# Patient Record
Sex: MALE | Race: WHITE | Employment: FULL TIME | URBAN - METROPOLITAN AREA
[De-identification: names, ages, dates, MRNs, and addresses within clinical notes are randomized per-mention and may not be internally consistent; named-entity substitution may affect disease eponyms.]

---

## 2018-02-07 ENCOUNTER — HOSPITAL ENCOUNTER (OUTPATIENT)
Dept: MRI IMAGING | Age: 58
Discharge: HOME | End: 2018-02-07
Attending: FAMILY MEDICINE

## 2018-02-07 DIAGNOSIS — M54.32 SCIATICA, LEFT SIDE: ICD-10-CM

## 2018-03-04 ENCOUNTER — HOSPITAL ENCOUNTER (OUTPATIENT)
Dept: MRI IMAGING | Age: 58
Discharge: HOME | End: 2018-03-04
Attending: FAMILY MEDICINE
Payer: COMMERCIAL

## 2018-03-04 PROCEDURE — 72148 MRI LUMBAR SPINE W/O DYE: CPT

## 2022-08-23 ENCOUNTER — TRANSCRIBE ORDER (OUTPATIENT)
Dept: SCHEDULING | Age: 62
End: 2022-08-23

## 2022-08-23 DIAGNOSIS — M17.11 PRIMARY OSTEOARTHRITIS OF RIGHT KNEE: Primary | ICD-10-CM

## 2022-08-23 DIAGNOSIS — Z00.00 ROUTINE GENERAL MEDICAL EXAMINATION AT A HEALTH CARE FACILITY: Primary | ICD-10-CM

## 2022-09-27 ENCOUNTER — HOSPITAL ENCOUNTER (OUTPATIENT)
Dept: CT IMAGING | Age: 62
Discharge: HOME | End: 2022-09-27
Attending: ORTHOPAEDIC SURGERY
Payer: COMMERCIAL

## 2022-09-27 DIAGNOSIS — M17.11 PRIMARY OSTEOARTHRITIS OF RIGHT KNEE: ICD-10-CM

## 2022-09-27 PROCEDURE — 73700 CT LOWER EXTREMITY W/O DYE: CPT

## 2022-11-28 ENCOUNTER — HOSPITAL ENCOUNTER (OUTPATIENT)
Dept: PREADMISSION TESTING | Age: 62
Discharge: HOME | End: 2022-11-28
Payer: COMMERCIAL

## 2022-11-28 VITALS
HEART RATE: 67 BPM | TEMPERATURE: 97.5 F | BODY MASS INDEX: 32.6 KG/M2 | RESPIRATION RATE: 16 BRPM | WEIGHT: 240.7 LBS | DIASTOLIC BLOOD PRESSURE: 82 MMHG | OXYGEN SATURATION: 98 % | HEIGHT: 72 IN | SYSTOLIC BLOOD PRESSURE: 139 MMHG

## 2022-11-28 LAB
ALBUMIN SERPL-MCNC: 3.9 G/DL (ref 3.5–5)
ALBUMIN/GLOB SERPL: 1.2 {RATIO} (ref 1.1–2.2)
ALP SERPL-CCNC: 49 U/L (ref 45–117)
ALT SERPL-CCNC: 36 U/L (ref 12–78)
ANION GAP SERPL CALC-SCNC: 4 MMOL/L (ref 5–15)
APPEARANCE UR: CLEAR
AST SERPL-CCNC: 27 U/L (ref 15–37)
ATRIAL RATE: 57 BPM
BACTERIA URNS QL MICRO: NEGATIVE /HPF
BASOPHILS # BLD: 0 K/UL (ref 0–0.1)
BASOPHILS NFR BLD: 0 % (ref 0–1)
BILIRUB SERPL-MCNC: 0.5 MG/DL (ref 0.2–1)
BILIRUB UR QL: NEGATIVE
BUN SERPL-MCNC: 31 MG/DL (ref 6–20)
BUN/CREAT SERPL: 34 (ref 12–20)
CALCIUM SERPL-MCNC: 9.5 MG/DL (ref 8.5–10.1)
CALCULATED P AXIS, ECG09: 67 DEGREES
CALCULATED R AXIS, ECG10: 52 DEGREES
CALCULATED T AXIS, ECG11: 54 DEGREES
CHLORIDE SERPL-SCNC: 103 MMOL/L (ref 97–108)
CO2 SERPL-SCNC: 31 MMOL/L (ref 21–32)
COLOR UR: ABNORMAL
CREAT SERPL-MCNC: 0.9 MG/DL (ref 0.7–1.3)
CRP SERPL-MCNC: <0.29 MG/DL (ref 0–0.6)
DIAGNOSIS, 93000: NORMAL
DIFFERENTIAL METHOD BLD: ABNORMAL
EOSINOPHIL # BLD: 0.1 K/UL (ref 0–0.4)
EOSINOPHIL NFR BLD: 2 % (ref 0–7)
EPITH CASTS URNS QL MICRO: ABNORMAL /LPF
ERYTHROCYTE [DISTWIDTH] IN BLOOD BY AUTOMATED COUNT: 11.9 % (ref 11.5–14.5)
ERYTHROCYTE [SEDIMENTATION RATE] IN BLOOD: 7 MM/HR (ref 0–20)
EST. AVERAGE GLUCOSE BLD GHB EST-MCNC: 97 MG/DL
GLOBULIN SER CALC-MCNC: 3.2 G/DL (ref 2–4)
GLUCOSE SERPL-MCNC: 74 MG/DL (ref 65–100)
GLUCOSE UR STRIP.AUTO-MCNC: NEGATIVE MG/DL
HBA1C MFR BLD: 5 % (ref 4–5.6)
HCT VFR BLD AUTO: 41.2 % (ref 36.6–50.3)
HGB BLD-MCNC: 14.1 G/DL (ref 12.1–17)
HGB UR QL STRIP: ABNORMAL
HYALINE CASTS URNS QL MICRO: ABNORMAL /LPF (ref 0–2)
IMM GRANULOCYTES # BLD AUTO: 0 K/UL (ref 0–0.04)
IMM GRANULOCYTES NFR BLD AUTO: 0 % (ref 0–0.5)
KETONES UR QL STRIP.AUTO: NEGATIVE MG/DL
LEUKOCYTE ESTERASE UR QL STRIP.AUTO: NEGATIVE
LYMPHOCYTES # BLD: 1.2 K/UL (ref 0.8–3.5)
LYMPHOCYTES NFR BLD: 26 % (ref 12–49)
MCH RBC QN AUTO: 32.3 PG (ref 26–34)
MCHC RBC AUTO-ENTMCNC: 34.2 G/DL (ref 30–36.5)
MCV RBC AUTO: 94.3 FL (ref 80–99)
MONOCYTES # BLD: 0.7 K/UL (ref 0–1)
MONOCYTES NFR BLD: 14 % (ref 5–13)
NEUTS SEG # BLD: 2.8 K/UL (ref 1.8–8)
NEUTS SEG NFR BLD: 58 % (ref 32–75)
NITRITE UR QL STRIP.AUTO: NEGATIVE
NRBC # BLD: 0 K/UL (ref 0–0.01)
NRBC BLD-RTO: 0 PER 100 WBC
P-R INTERVAL, ECG05: 160 MS
PH UR STRIP: 5 [PH] (ref 5–8)
PLATELET # BLD AUTO: 231 K/UL (ref 150–400)
PMV BLD AUTO: 9.7 FL (ref 8.9–12.9)
POTASSIUM SERPL-SCNC: 4.3 MMOL/L (ref 3.5–5.1)
PROT SERPL-MCNC: 7.1 G/DL (ref 6.4–8.2)
PROT UR STRIP-MCNC: NEGATIVE MG/DL
Q-T INTERVAL, ECG07: 402 MS
QRS DURATION, ECG06: 112 MS
QTC CALCULATION (BEZET), ECG08: 391 MS
RBC # BLD AUTO: 4.37 M/UL (ref 4.1–5.7)
RBC #/AREA URNS HPF: ABNORMAL /HPF (ref 0–5)
SODIUM SERPL-SCNC: 138 MMOL/L (ref 136–145)
SP GR UR REFRACTOMETRY: 1.01 (ref 1–1.03)
UA: UC IF INDICATED,UAUC: ABNORMAL
UROBILINOGEN UR QL STRIP.AUTO: 0.2 EU/DL (ref 0.2–1)
VENTRICULAR RATE, ECG03: 57 BPM
WBC # BLD AUTO: 4.9 K/UL (ref 4.1–11.1)
WBC URNS QL MICRO: ABNORMAL /HPF (ref 0–4)

## 2022-11-28 PROCEDURE — 86140 C-REACTIVE PROTEIN: CPT

## 2022-11-28 PROCEDURE — 85652 RBC SED RATE AUTOMATED: CPT

## 2022-11-28 PROCEDURE — 85025 COMPLETE CBC W/AUTO DIFF WBC: CPT

## 2022-11-28 PROCEDURE — 36415 COLL VENOUS BLD VENIPUNCTURE: CPT

## 2022-11-28 PROCEDURE — 81001 URINALYSIS AUTO W/SCOPE: CPT

## 2022-11-28 PROCEDURE — 83036 HEMOGLOBIN GLYCOSYLATED A1C: CPT

## 2022-11-28 PROCEDURE — 80053 COMPREHEN METABOLIC PANEL: CPT

## 2022-11-28 PROCEDURE — 84466 ASSAY OF TRANSFERRIN: CPT

## 2022-11-28 PROCEDURE — 93005 ELECTROCARDIOGRAM TRACING: CPT

## 2022-11-28 RX ORDER — ESCITALOPRAM OXALATE 10 MG/1
10 TABLET ORAL EVERY MORNING
COMMUNITY

## 2022-11-28 RX ORDER — DICLOFENAC SODIUM 75 MG/1
75 TABLET, DELAYED RELEASE ORAL 2 TIMES DAILY
COMMUNITY

## 2022-11-28 NOTE — PERIOP NOTES
[address] , [address] [facility] (formerly [facility])   MAIN OR                                  [phone]   MAIN PRE OP                          [phone]                                                                                AMBULATORY PRE OP          [phone]  PRE-ADMISSION TESTING    [phone]     Surgery Date:  12/5/2022 Monday       Is surgery arrival time given by surgeon? NO  If NO, [facility] staff will call you between 3 and 7pm the day before your surgery with your arrival time. (If your surgery is on a Monday, we will call you the Friday before.)    Call [phone] after 7pm Monday-Friday if you did not receive this call. INSTRUCTIONS BEFORE YOUR SURGERY   When You  Arrive Arrive at the [address] [facility] on the day of your surgery  Have your insurance card, photo ID, and any copayment (if needed)   Food   and   Drink NO food or drink after midnight the night before surgery    This means NO water, gum, mints, coffee, juice, etc.  No alcohol (beer, wine, liquor) 24 hours before and after surgery   Medications to   TAKE   Morning of Surgery MEDICATIONS TO TAKE THE MORNING OF SURGERY WITH A SIP OF WATER:   Escitalopram       Medications  To  STOP      7 days before surgery Non-Steroidal anti-inflammatory Drugs (NSAID's): for example, Ibuprofen (Advil, Motrin), Naproxen (Aleve)  Aspirin, if taking for pain   Herbal supplements, vitamins, and fish oil  Other: Diclofenac  (Pain medications not listed above, including Tylenol may be taken)   Blood  Thinners If you take  Aspirin, Plavix, Coumadin, or any blood-thinning or anti-blood clot medicine, talk to the doctor who prescribed the medications for pre-operative instructions.    Bathing Clothing  Jewelry  Valuables     If you shower the morning of surgery, please do not apply anything to your skin (lotions, powders, deodorant, or makeup, especially mascara)  Follow Chlorhexidine [employer] body wash instructions provided to you during PAT appointment. Begin 3 days prior to surgery. Do not shave or trim anywhere 24 hours before surgery  Wear your hair loose or down; no pony-tails, buns, or metal hair clips  Wear loose, comfortable, clean clothes  Wear glasses instead of contacts  Leave money, valuables, and jewelry, including body piercings, at home  If you were given an [employer], bring it on day of surgery. Going Home - or Spending the Night SAME-DAY SURGERY: You must have a responsible adult drive you home and stay with you 24 hours after surgery  ADMITS: If your doctor is keeping you in the hospital after surgery, leave personal belongings/luggage in your car until you have a hospital room number. Hospital discharge time is 12 noon  Drivers must be here before 12 noon unless you are told differently   Special Instructions        Follow all instructions so your surgery wont be cancelled. Please, be on time. If a situation occurs and you are delayed the day of surgery, call [phone]. If your physical condition changes (like a fever, cold, flu, etc.) call your surgeon. Home medication(s) reviewed and verified verbally with list during PAT appointment. The patient was contacted in person. The patient verbalizes understanding of all instructions and does not need reinforcement.

## 2022-11-29 LAB
BACTERIA SPEC CULT: NORMAL
BACTERIA SPEC CULT: NORMAL
SERVICE CMNT-IMP: NORMAL

## 2022-11-30 LAB — TRANSFERRIN SERPL-MCNC: 242 MG/DL (ref 177–329)

## 2022-11-30 NOTE — PERIOP NOTES
11/30 @ :  pt returned call w/appt info. Utilizing EMR, routed labs/ECG from 11/28 to PCP for H&P appt @ [id number] today.

## 2022-12-02 ENCOUNTER — ANESTHESIA EVENT (OUTPATIENT)
Dept: SURGERY | Age: 62
End: 2022-12-02
Payer: COMMERCIAL

## 2022-12-05 ENCOUNTER — APPOINTMENT (OUTPATIENT)
Dept: GENERAL RADIOLOGY | Age: 62
End: 2022-12-05
Attending: PHYSICIAN ASSISTANT
Payer: COMMERCIAL

## 2022-12-05 ENCOUNTER — ANESTHESIA (OUTPATIENT)
Dept: SURGERY | Age: 62
End: 2022-12-05
Payer: COMMERCIAL

## 2022-12-05 ENCOUNTER — HOSPITAL ENCOUNTER (OUTPATIENT)
Age: 62
Setting detail: OBSERVATION
Discharge: HOME | End: 2022-12-06
Attending: ORTHOPAEDIC SURGERY | Admitting: ORTHOPAEDIC SURGERY
Payer: COMMERCIAL

## 2022-12-05 DIAGNOSIS — M17.11 PRIMARY OSTEOARTHRITIS OF RIGHT KNEE: Primary | ICD-10-CM

## 2022-12-05 PROCEDURE — 74011250636 HC RX REV CODE- 250/636: Performed by: ANESTHESIOLOGY

## 2022-12-05 PROCEDURE — 74011250636 HC RX REV CODE- 250/636: Performed by: PHYSICIAN ASSISTANT

## 2022-12-05 PROCEDURE — 74011000250 HC RX REV CODE- 250: Performed by: PHYSICIAN ASSISTANT

## 2022-12-05 PROCEDURE — 77030007866 HC KT SPN ANES BBMI -B

## 2022-12-05 PROCEDURE — 77030040361 HC SLV COMPR DVT MDII -B

## 2022-12-05 PROCEDURE — C1776 JOINT DEVICE (IMPLANTABLE): HCPCS | Performed by: ORTHOPAEDIC SURGERY

## 2022-12-05 PROCEDURE — 77030006835 HC BLD SAW SAG STRY -B: Performed by: ORTHOPAEDIC SURGERY

## 2022-12-05 PROCEDURE — 74011250637 HC RX REV CODE- 250/637: Performed by: PHYSICIAN ASSISTANT

## 2022-12-05 PROCEDURE — 76030000020 HC AMB SURG 1.5 TO 2 HR INTENSV-TIER 1: Performed by: ORTHOPAEDIC SURGERY

## 2022-12-05 PROCEDURE — 74011000250 HC RX REV CODE- 250: Performed by: NURSE ANESTHETIST, CERTIFIED REGISTERED

## 2022-12-05 PROCEDURE — 97161 PT EVAL LOW COMPLEX 20 MIN: CPT

## 2022-12-05 PROCEDURE — 97116 GAIT TRAINING THERAPY: CPT

## 2022-12-05 PROCEDURE — 77030038149 HC BLD SAW SAG STRY -D: Performed by: ORTHOPAEDIC SURGERY

## 2022-12-05 PROCEDURE — 77030040393 HC DRSG OPTIFOAM GENT MDII -B: Performed by: ORTHOPAEDIC SURGERY

## 2022-12-05 PROCEDURE — 97530 THERAPEUTIC ACTIVITIES: CPT

## 2022-12-05 PROCEDURE — 73560 X-RAY EXAM OF KNEE 1 OR 2: CPT

## 2022-12-05 PROCEDURE — 74011000250 HC RX REV CODE- 250: Performed by: ORTHOPAEDIC SURGERY

## 2022-12-05 PROCEDURE — 76060000063 HC AMB SURG ANES 1.5 TO 2 HR: Performed by: ORTHOPAEDIC SURGERY

## 2022-12-05 PROCEDURE — 77030041305 HC PN BN MAKO STRY -B: Performed by: ORTHOPAEDIC SURGERY

## 2022-12-05 PROCEDURE — 74011250636 HC RX REV CODE- 250/636: Performed by: NURSE ANESTHETIST, CERTIFIED REGISTERED

## 2022-12-05 PROCEDURE — 77030003601 HC NDL NRV BLK BBMI -A

## 2022-12-05 PROCEDURE — 77030002933 HC SUT MCRYL J&J -A: Performed by: ORTHOPAEDIC SURGERY

## 2022-12-05 PROCEDURE — 76210000036 HC AMBSU PH I REC 1.5 TO 2 HR: Performed by: ORTHOPAEDIC SURGERY

## 2022-12-05 PROCEDURE — 77030029820: Performed by: ORTHOPAEDIC SURGERY

## 2022-12-05 PROCEDURE — 2709999900 HC NON-CHARGEABLE SUPPLY: Performed by: ORTHOPAEDIC SURGERY

## 2022-12-05 PROCEDURE — 77030018723 HC ELCTRD BLD COVD -A: Performed by: ORTHOPAEDIC SURGERY

## 2022-12-05 PROCEDURE — 77030040830 HC CATH URETH FOL MDII -A: Performed by: ORTHOPAEDIC SURGERY

## 2022-12-05 PROCEDURE — 74011250637 HC RX REV CODE- 250/637: Performed by: ANESTHESIOLOGY

## 2022-12-05 PROCEDURE — 77030040922 HC BLNKT HYPOTHRM STRY -A

## 2022-12-05 PROCEDURE — 77030035236 HC SUT PDS STRATFX BARB J&J -B: Performed by: ORTHOPAEDIC SURGERY

## 2022-12-05 PROCEDURE — G0378 HOSPITAL OBSERVATION PER HR: HCPCS

## 2022-12-05 PROCEDURE — 77030031139 HC SUT VCRL2 J&J -A: Performed by: ORTHOPAEDIC SURGERY

## 2022-12-05 PROCEDURE — 77030029828 HC FEM TIB CKPNT KT DISP STRY -B: Performed by: ORTHOPAEDIC SURGERY

## 2022-12-05 DEVICE — TIBIAL BEARING INSERT - CR
Type: IMPLANTABLE DEVICE | Site: KNEE | Status: FUNCTIONAL
Brand: TRIATHLON

## 2022-12-05 DEVICE — KNEE K2 TOT HEMI ADV CMTLS -- IMPL CAPPED K2: Type: IMPLANTABLE DEVICE | Status: FUNCTIONAL

## 2022-12-05 DEVICE — TIBIAL COMPONENT
Type: IMPLANTABLE DEVICE | Site: KNEE | Status: FUNCTIONAL
Brand: TRIATHLON

## 2022-12-05 DEVICE — PATELLA
Type: IMPLANTABLE DEVICE | Site: KNEE | Status: FUNCTIONAL
Brand: TRIATHLON

## 2022-12-05 DEVICE — CRUCIATE RETAINING FEMORAL
Type: IMPLANTABLE DEVICE | Site: KNEE | Status: FUNCTIONAL
Brand: TRIATHLON

## 2022-12-05 RX ORDER — POVIDONE-IODINE 10 %
SOLUTION, NON-ORAL TOPICAL AS NEEDED
Status: DISCONTINUED | OUTPATIENT
Start: 2022-12-05 | End: 2022-12-05 | Stop reason: HOSPADM

## 2022-12-05 RX ORDER — SODIUM CHLORIDE 0.9 % (FLUSH) 0.9 %
5-40 SYRINGE (ML) INJECTION AS NEEDED
Status: DISCONTINUED | OUTPATIENT
Start: 2022-12-05 | End: 2022-12-05 | Stop reason: HOSPADM

## 2022-12-05 RX ORDER — EPHEDRINE SULFATE/0.9% NACL/PF 50 MG/5 ML
SYRINGE (ML) INTRAVENOUS AS NEEDED
Status: DISCONTINUED | OUTPATIENT
Start: 2022-12-05 | End: 2022-12-05

## 2022-12-05 RX ORDER — EPHEDRINE SULFATE/0.9% NACL/PF 50 MG/5 ML
SYRINGE (ML) INTRAVENOUS AS NEEDED
Status: DISCONTINUED | OUTPATIENT
Start: 2022-12-05 | End: 2022-12-05 | Stop reason: HOSPADM

## 2022-12-05 RX ORDER — TRAZODONE HYDROCHLORIDE 100 MG/1
100 TABLET ORAL
Status: DISCONTINUED | OUTPATIENT
Start: 2022-12-05 | End: 2022-12-06 | Stop reason: HOSPADM

## 2022-12-05 RX ORDER — ONDANSETRON 2 MG/ML
INJECTION INTRAMUSCULAR; INTRAVENOUS AS NEEDED
Status: DISCONTINUED | OUTPATIENT
Start: 2022-12-05 | End: 2022-12-05 | Stop reason: HOSPADM

## 2022-12-05 RX ORDER — ESCITALOPRAM OXALATE 10 MG/1
10 TABLET ORAL EVERY MORNING
Status: DISCONTINUED | OUTPATIENT
Start: 2022-12-06 | End: 2022-12-06 | Stop reason: HOSPADM

## 2022-12-05 RX ORDER — FAMOTIDINE 20 MG/1
20 TABLET, FILM COATED ORAL 2 TIMES DAILY
Status: DISCONTINUED | OUTPATIENT
Start: 2022-12-05 | End: 2022-12-06 | Stop reason: HOSPADM

## 2022-12-05 RX ORDER — FACIAL-BODY WIPES
10 EACH TOPICAL DAILY PRN
Status: DISCONTINUED | OUTPATIENT
Start: 2022-12-07 | End: 2022-12-06 | Stop reason: HOSPADM

## 2022-12-05 RX ORDER — FENTANYL CITRATE 50 UG/ML
INJECTION, SOLUTION INTRAMUSCULAR; INTRAVENOUS AS NEEDED
Status: DISCONTINUED | OUTPATIENT
Start: 2022-12-05 | End: 2022-12-05 | Stop reason: HOSPADM

## 2022-12-05 RX ORDER — POLYETHYLENE GLYCOL 3350 17 G/17G
17 POWDER, FOR SOLUTION ORAL DAILY
Status: DISCONTINUED | OUTPATIENT
Start: 2022-12-05 | End: 2022-12-06 | Stop reason: HOSPADM

## 2022-12-05 RX ORDER — PROPOFOL 10 MG/ML
INJECTION, EMULSION INTRAVENOUS
Status: DISCONTINUED | OUTPATIENT
Start: 2022-12-05 | End: 2022-12-05 | Stop reason: HOSPADM

## 2022-12-05 RX ORDER — PREGABALIN 75 MG/1
75 CAPSULE ORAL
Qty: 30 CAPSULE | Refills: 0 | Status: SHIPPED | OUTPATIENT
Start: 2022-12-05

## 2022-12-05 RX ORDER — ACETAMINOPHEN 325 MG/1
975 TABLET ORAL ONCE
Status: COMPLETED | OUTPATIENT
Start: 2022-12-05 | End: 2022-12-05

## 2022-12-05 RX ORDER — IBUPROFEN 800 MG/1
800 TABLET ORAL
Qty: 50 TABLET | Refills: 2 | Status: SHIPPED | OUTPATIENT
Start: 2022-12-05

## 2022-12-05 RX ORDER — LIDOCAINE HYDROCHLORIDE 10 MG/ML
0.1 INJECTION, SOLUTION EPIDURAL; INFILTRATION; INTRACAUDAL; PERINEURAL AS NEEDED
Status: DISCONTINUED | OUTPATIENT
Start: 2022-12-05 | End: 2022-12-05 | Stop reason: HOSPADM

## 2022-12-05 RX ORDER — THERA TABS 400 MCG
1 TAB ORAL DAILY
Status: DISCONTINUED | OUTPATIENT
Start: 2022-12-06 | End: 2022-12-06 | Stop reason: HOSPADM

## 2022-12-05 RX ORDER — SODIUM CHLORIDE, SODIUM LACTATE, POTASSIUM CHLORIDE, CALCIUM CHLORIDE 600; 310; 30; 20 MG/100ML; MG/100ML; MG/100ML; MG/100ML
125 INJECTION, SOLUTION INTRAVENOUS CONTINUOUS
Status: DISCONTINUED | OUTPATIENT
Start: 2022-12-05 | End: 2022-12-05 | Stop reason: HOSPADM

## 2022-12-05 RX ORDER — ACETAMINOPHEN 325 MG/1
650 TABLET ORAL EVERY 6 HOURS
Status: DISCONTINUED | OUTPATIENT
Start: 2022-12-05 | End: 2022-12-06 | Stop reason: HOSPADM

## 2022-12-05 RX ORDER — DIPHENHYDRAMINE HYDROCHLORIDE 50 MG/ML
12.5 INJECTION, SOLUTION INTRAMUSCULAR; INTRAVENOUS
Status: ACTIVE | OUTPATIENT
Start: 2022-12-05 | End: 2022-12-06

## 2022-12-05 RX ORDER — SODIUM CHLORIDE 0.9 % (FLUSH) 0.9 %
5-40 SYRINGE (ML) INJECTION EVERY 8 HOURS
Status: DISCONTINUED | OUTPATIENT
Start: 2022-12-05 | End: 2022-12-06 | Stop reason: HOSPADM

## 2022-12-05 RX ORDER — TRANEXAMIC ACID 100 MG/ML
INJECTION, SOLUTION INTRAVENOUS AS NEEDED
Status: DISCONTINUED | OUTPATIENT
Start: 2022-12-05 | End: 2022-12-05 | Stop reason: HOSPADM

## 2022-12-05 RX ORDER — CELECOXIB 100 MG/1
100 CAPSULE ORAL ONCE
Status: COMPLETED | OUTPATIENT
Start: 2022-12-05 | End: 2022-12-05

## 2022-12-05 RX ORDER — PREGABALIN 75 MG/1
75 CAPSULE ORAL
Status: DISCONTINUED | OUTPATIENT
Start: 2022-12-05 | End: 2022-12-06 | Stop reason: HOSPADM

## 2022-12-05 RX ORDER — SODIUM CHLORIDE 0.9 % (FLUSH) 0.9 %
5-40 SYRINGE (ML) INJECTION AS NEEDED
Status: DISCONTINUED | OUTPATIENT
Start: 2022-12-05 | End: 2022-12-06 | Stop reason: HOSPADM

## 2022-12-05 RX ORDER — ONDANSETRON 2 MG/ML
4 INJECTION INTRAMUSCULAR; INTRAVENOUS AS NEEDED
Status: DISCONTINUED | OUTPATIENT
Start: 2022-12-05 | End: 2022-12-05 | Stop reason: HOSPADM

## 2022-12-05 RX ORDER — FENTANYL CITRATE 50 UG/ML
25 INJECTION, SOLUTION INTRAMUSCULAR; INTRAVENOUS
Status: DISCONTINUED | OUTPATIENT
Start: 2022-12-05 | End: 2022-12-05 | Stop reason: SDUPTHER

## 2022-12-05 RX ORDER — MIDAZOLAM HYDROCHLORIDE 1 MG/ML
INJECTION, SOLUTION INTRAMUSCULAR; INTRAVENOUS AS NEEDED
Status: DISCONTINUED | OUTPATIENT
Start: 2022-12-05 | End: 2022-12-05 | Stop reason: HOSPADM

## 2022-12-05 RX ORDER — CELECOXIB 100 MG/1
200 CAPSULE ORAL 2 TIMES DAILY
Status: DISCONTINUED | OUTPATIENT
Start: 2022-12-06 | End: 2022-12-06 | Stop reason: HOSPADM

## 2022-12-05 RX ORDER — ATORVASTATIN CALCIUM 10 MG/1
10 TABLET, FILM COATED ORAL
Status: DISCONTINUED | OUTPATIENT
Start: 2022-12-05 | End: 2022-12-06 | Stop reason: HOSPADM

## 2022-12-05 RX ORDER — ALBUTEROL SULFATE 0.83 MG/ML
2.5 SOLUTION RESPIRATORY (INHALATION) AS NEEDED
Status: DISCONTINUED | OUTPATIENT
Start: 2022-12-05 | End: 2022-12-05 | Stop reason: HOSPADM

## 2022-12-05 RX ORDER — BUPIVACAINE HYDROCHLORIDE 5 MG/ML
INJECTION, SOLUTION EPIDURAL; INTRACAUDAL AS NEEDED
Status: DISCONTINUED | OUTPATIENT
Start: 2022-12-05 | End: 2022-12-05 | Stop reason: HOSPADM

## 2022-12-05 RX ORDER — ASPIRIN 81 MG/1
81 TABLET ORAL 2 TIMES DAILY
Status: DISCONTINUED | OUTPATIENT
Start: 2022-12-05 | End: 2022-12-06 | Stop reason: HOSPADM

## 2022-12-05 RX ORDER — OXYCODONE HYDROCHLORIDE 5 MG/1
10 TABLET ORAL
Status: DISCONTINUED | OUTPATIENT
Start: 2022-12-05 | End: 2022-12-05 | Stop reason: HOSPADM

## 2022-12-05 RX ORDER — ONDANSETRON 2 MG/ML
4 INJECTION INTRAMUSCULAR; INTRAVENOUS
Status: ACTIVE | OUTPATIENT
Start: 2022-12-05 | End: 2022-12-06

## 2022-12-05 RX ORDER — OXYCODONE HYDROCHLORIDE 5 MG/1
5 TABLET ORAL
Qty: 42 TABLET | Refills: 0 | Status: SHIPPED | OUTPATIENT
Start: 2022-12-05 | End: 2022-12-12

## 2022-12-05 RX ORDER — ONDANSETRON 8 MG/1
4 TABLET, ORALLY DISINTEGRATING ORAL
Qty: 30 TABLET | Refills: 0 | Status: SHIPPED | OUTPATIENT
Start: 2022-12-05

## 2022-12-05 RX ORDER — ASPIRIN 81 MG/1
81 TABLET ORAL 2 TIMES DAILY
Qty: 60 TABLET | Refills: 0 | Status: SHIPPED | OUTPATIENT
Start: 2022-12-05

## 2022-12-05 RX ORDER — SODIUM CHLORIDE 0.9 % (FLUSH) 0.9 %
5-40 SYRINGE (ML) INJECTION EVERY 8 HOURS
Status: DISCONTINUED | OUTPATIENT
Start: 2022-12-05 | End: 2022-12-05 | Stop reason: HOSPADM

## 2022-12-05 RX ORDER — SODIUM CHLORIDE 9 MG/ML
125 INJECTION, SOLUTION INTRAVENOUS CONTINUOUS
Status: DISPENSED | OUTPATIENT
Start: 2022-12-05 | End: 2022-12-06

## 2022-12-05 RX ORDER — DIPHENHYDRAMINE HYDROCHLORIDE 50 MG/ML
12.5 INJECTION, SOLUTION INTRAMUSCULAR; INTRAVENOUS AS NEEDED
Status: DISCONTINUED | OUTPATIENT
Start: 2022-12-05 | End: 2022-12-05 | Stop reason: HOSPADM

## 2022-12-05 RX ORDER — FLUMAZENIL 0.1 MG/ML
0.2 INJECTION INTRAVENOUS
Status: DISCONTINUED | OUTPATIENT
Start: 2022-12-05 | End: 2022-12-05 | Stop reason: HOSPADM

## 2022-12-05 RX ORDER — OXYCODONE HYDROCHLORIDE 5 MG/1
5 TABLET ORAL
Status: DISCONTINUED | OUTPATIENT
Start: 2022-12-05 | End: 2022-12-06 | Stop reason: HOSPADM

## 2022-12-05 RX ORDER — KETOROLAC TROMETHAMINE 30 MG/ML
15 INJECTION, SOLUTION INTRAMUSCULAR; INTRAVENOUS EVERY 6 HOURS
Status: COMPLETED | OUTPATIENT
Start: 2022-12-05 | End: 2022-12-06

## 2022-12-05 RX ORDER — FENTANYL CITRATE 50 UG/ML
25 INJECTION, SOLUTION INTRAMUSCULAR; INTRAVENOUS
Status: DISCONTINUED | OUTPATIENT
Start: 2022-12-05 | End: 2022-12-05 | Stop reason: HOSPADM

## 2022-12-05 RX ORDER — HYDROMORPHONE HYDROCHLORIDE 1 MG/ML
0.5 INJECTION, SOLUTION INTRAMUSCULAR; INTRAVENOUS; SUBCUTANEOUS
Status: ACTIVE | OUTPATIENT
Start: 2022-12-05 | End: 2022-12-06

## 2022-12-05 RX ORDER — AMOXICILLIN 250 MG
1 CAPSULE ORAL 2 TIMES DAILY
Status: DISCONTINUED | OUTPATIENT
Start: 2022-12-05 | End: 2022-12-06 | Stop reason: HOSPADM

## 2022-12-05 RX ORDER — PREGABALIN 75 MG/1
75 CAPSULE ORAL ONCE
Status: COMPLETED | OUTPATIENT
Start: 2022-12-05 | End: 2022-12-05

## 2022-12-05 RX ORDER — NALOXONE HYDROCHLORIDE 0.4 MG/ML
0.4 INJECTION, SOLUTION INTRAMUSCULAR; INTRAVENOUS; SUBCUTANEOUS AS NEEDED
Status: DISCONTINUED | OUTPATIENT
Start: 2022-12-05 | End: 2022-12-06 | Stop reason: HOSPADM

## 2022-12-05 RX ORDER — HYDROMORPHONE HYDROCHLORIDE 1 MG/ML
.25-1 INJECTION, SOLUTION INTRAMUSCULAR; INTRAVENOUS; SUBCUTANEOUS
Status: DISCONTINUED | OUTPATIENT
Start: 2022-12-05 | End: 2022-12-05 | Stop reason: HOSPADM

## 2022-12-05 RX ORDER — OXYCODONE HYDROCHLORIDE 5 MG/1
10 TABLET ORAL
Status: DISCONTINUED | OUTPATIENT
Start: 2022-12-05 | End: 2022-12-06 | Stop reason: HOSPADM

## 2022-12-05 RX ORDER — ROPIVACAINE HYDROCHLORIDE 2 MG/ML
INJECTION, SOLUTION EPIDURAL; INFILTRATION; PERINEURAL AS NEEDED
Status: DISCONTINUED | OUTPATIENT
Start: 2022-12-05 | End: 2022-12-05 | Stop reason: HOSPADM

## 2022-12-05 RX ORDER — OXYCODONE HCL 10 MG/1
10 TABLET, FILM COATED, EXTENDED RELEASE ORAL ONCE
Status: COMPLETED | OUTPATIENT
Start: 2022-12-05 | End: 2022-12-05

## 2022-12-05 RX ORDER — NALOXONE HYDROCHLORIDE 0.4 MG/ML
0.04 INJECTION, SOLUTION INTRAMUSCULAR; INTRAVENOUS; SUBCUTANEOUS
Status: DISCONTINUED | OUTPATIENT
Start: 2022-12-05 | End: 2022-12-05 | Stop reason: HOSPADM

## 2022-12-05 RX ORDER — KETOROLAC TROMETHAMINE 30 MG/ML
15 INJECTION, SOLUTION INTRAMUSCULAR; INTRAVENOUS
Status: DISCONTINUED | OUTPATIENT
Start: 2022-12-05 | End: 2022-12-05 | Stop reason: HOSPADM

## 2022-12-05 RX ORDER — ACETAMINOPHEN 500 MG
975 TABLET ORAL
Qty: 60 TABLET | Refills: 1 | Status: SHIPPED | OUTPATIENT
Start: 2022-12-05

## 2022-12-05 RX ORDER — TRAMADOL HYDROCHLORIDE 50 MG/1
50 TABLET ORAL
Qty: 28 TABLET | Refills: 0 | Status: SHIPPED | OUTPATIENT
Start: 2022-12-05 | End: 2022-12-12

## 2022-12-05 RX ADMIN — MIDAZOLAM HYDROCHLORIDE 2 MG: 1 INJECTION, SOLUTION INTRAMUSCULAR; INTRAVENOUS at 06:54

## 2022-12-05 RX ADMIN — OXYCODONE 10 MG: 5 TABLET ORAL at 20:08

## 2022-12-05 RX ADMIN — FAMOTIDINE 20 MG: 20 TABLET, FILM COATED ORAL at 16:42

## 2022-12-05 RX ADMIN — OXYCODONE HYDROCHLORIDE 10 MG: 10 TABLET, FILM COATED, EXTENDED RELEASE ORAL at 06:28

## 2022-12-05 RX ADMIN — OXYCODONE 5 MG: 5 TABLET ORAL at 13:27

## 2022-12-05 RX ADMIN — BUPIVACAINE HYDROCHLORIDE 2.6 MG: 5 INJECTION, SOLUTION EPIDURAL; INTRACAUDAL; PERINEURAL at 07:09

## 2022-12-05 RX ADMIN — MIDAZOLAM HYDROCHLORIDE 2 MG: 1 INJECTION, SOLUTION INTRAMUSCULAR; INTRAVENOUS at 08:23

## 2022-12-05 RX ADMIN — PREGABALIN 75 MG: 75 CAPSULE ORAL at 06:28

## 2022-12-05 RX ADMIN — SENNOSIDES AND DOCUSATE SODIUM 1 TABLET: 50; 8.6 TABLET ORAL at 16:42

## 2022-12-05 RX ADMIN — TRAZODONE HYDROCHLORIDE 100 MG: 100 TABLET ORAL at 21:48

## 2022-12-05 RX ADMIN — SODIUM CHLORIDE, PRESERVATIVE FREE 10 ML: 5 INJECTION INTRAVENOUS at 21:49

## 2022-12-05 RX ADMIN — ACETAMINOPHEN 650 MG: 325 TABLET ORAL at 12:03

## 2022-12-05 RX ADMIN — POLYETHYLENE GLYCOL 3350 17 G: 17 POWDER, FOR SOLUTION ORAL at 12:02

## 2022-12-05 RX ADMIN — ONDANSETRON HYDROCHLORIDE 4 MG: 2 SOLUTION INTRAMUSCULAR; INTRAVENOUS at 09:05

## 2022-12-05 RX ADMIN — CEFAZOLIN 2 G: 1 INJECTION, POWDER, FOR SOLUTION INTRAMUSCULAR; INTRAVENOUS at 16:38

## 2022-12-05 RX ADMIN — TRANEXAMIC ACID 1 G: 100 INJECTION, SOLUTION INTRAVENOUS at 09:01

## 2022-12-05 RX ADMIN — FENTANYL CITRATE 50 MCG: 50 INJECTION, SOLUTION INTRAMUSCULAR; INTRAVENOUS at 06:54

## 2022-12-05 RX ADMIN — ACETAMINOPHEN 975 MG: 325 TABLET ORAL at 06:28

## 2022-12-05 RX ADMIN — Medication 10 MG: at 07:51

## 2022-12-05 RX ADMIN — PREGABALIN 75 MG: 75 CAPSULE ORAL at 21:48

## 2022-12-05 RX ADMIN — TRANEXAMIC ACID 1 G: 100 INJECTION, SOLUTION INTRAVENOUS at 07:51

## 2022-12-05 RX ADMIN — ATORVASTATIN CALCIUM 10 MG: 10 TABLET, FILM COATED ORAL at 21:48

## 2022-12-05 RX ADMIN — SENNOSIDES AND DOCUSATE SODIUM 1 TABLET: 50; 8.6 TABLET ORAL at 12:03

## 2022-12-05 RX ADMIN — PROPOFOL 75 MCG/KG/MIN: 10 INJECTION, EMULSION INTRAVENOUS at 07:39

## 2022-12-05 RX ADMIN — ASPIRIN 81 MG: 81 TABLET, COATED ORAL at 16:41

## 2022-12-05 RX ADMIN — OXYCODONE 5 MG: 5 TABLET ORAL at 16:39

## 2022-12-05 RX ADMIN — KETOROLAC TROMETHAMINE 15 MG: 30 INJECTION, SOLUTION INTRAMUSCULAR; INTRAVENOUS at 16:38

## 2022-12-05 RX ADMIN — CELECOXIB 100 MG: 100 CAPSULE ORAL at 06:28

## 2022-12-05 RX ADMIN — MIDAZOLAM HYDROCHLORIDE 2 MG: 1 INJECTION, SOLUTION INTRAMUSCULAR; INTRAVENOUS at 07:36

## 2022-12-05 RX ADMIN — ROPIVACAINE HYDROCHLORIDE 20 ML: 2 INJECTION, SOLUTION EPIDURAL; INFILTRATION at 07:02

## 2022-12-05 RX ADMIN — SODIUM CHLORIDE, POTASSIUM CHLORIDE, SODIUM LACTATE AND CALCIUM CHLORIDE: 600; 310; 30; 20 INJECTION, SOLUTION INTRAVENOUS at 07:30

## 2022-12-05 RX ADMIN — ACETAMINOPHEN 650 MG: 325 TABLET ORAL at 16:41

## 2022-12-05 RX ADMIN — FAMOTIDINE 20 MG: 20 TABLET, FILM COATED ORAL at 12:03

## 2022-12-05 RX ADMIN — SODIUM CHLORIDE 125 ML/HR: 9 INJECTION, SOLUTION INTRAVENOUS at 11:46

## 2022-12-05 RX ADMIN — CEFAZOLIN SODIUM 2 G: 1 POWDER, FOR SOLUTION INTRAMUSCULAR; INTRAVENOUS at 07:51

## 2022-12-05 RX ADMIN — FENTANYL CITRATE 50 MCG: 50 INJECTION, SOLUTION INTRAMUSCULAR; INTRAVENOUS at 06:56

## 2022-12-05 NOTE — DISCHARGE INSTRUCTIONS
TOTAL KNEE DISCHARGE INSTRUCTIONS      Patient: Orion An MRN: [id number]  SSN: [id number]              Please take the time to review the following instructions before you leave the hospital and use them as guidelines during your recovery from surgery. If you have any questions you may contact my office at [phone]  After business hours or during the weekend you can contact me through [address] or text / call at [phone] (cell phone) for emergency's. Please use the office number during regular business hours. SPECIAL INSTRUCTIONS :   1. Full extension at the knee is the most important aspect of your range of motion. Avoid placing a pillow or bump behind the knee. Rather, place the heel up on a bump or pillow and allow gravity to help straighten the knee. 2. You may weight bear as tolerated on the knee and during the day you should bend the knee as much as possible. 3. Drainage from the incision more than 4 days from surgery is concerning. Contact my office if there is any question [phone].   4. You may contact me directly through [employer] if there are specific questions or text / call using my cell number [phone]. DRESSING :     Post-op Dressings : This should be removed by physical therapy or you may remove this yourself 7 days after the date of your surgery. If there is no drainage, then a simple dressing may be used or no dressing at all. Other dressing options can be purchased over the counter at a local pharmacy or medical supply vendor. A porous adhesive dressing such as pictured above can be purchased online ([address] [facility] [address]) or at your local [facility] or [employer]. You only need to keep the incision covered for 7 days after showers. A dressing may be used for longer if there are issues with clothing clinging to the incision. Showering/ Bathing: You may shower with the Post-op dressing in place.  This is left in place for 7 days following discharge from the hospital. If your incision is dry without drainage you may shower following your discharge home. After 7 days your dressing should be removed for showering. It is fine to have water run over the incision. Do not vigorously scrub your incision. Apply a clean, dry dressing after you have dried your incision. Do not take a bath or get into a swimming pool / [place] until you follow up with Dr. Cordelia Chakraborty. Do not soak your incision under water. If there is continued drainage or you are concerned contact Dr Doneta Bloch office prior to showering [phone] ext [phone] . Diet:  You may advance to your regular diet as tolerated. Increase your clear liquid intake for the next 2-3 days. Medication:      You will be given prescriptions for pain medication when you are discharged from the hospital. The side effects of these medications can be substantial and the narcotic medications are not mandatory. You may substitute these medications with Tylenol or Alleve / Motrin. Please use the medications as prescribed. Pain medications may cause constipation- Colace twice daily and Miralax one scoop daily while taking the narcotic medication should help prevent constipation. Please discuss with your local pharmacist regarding increasing this dosage if constipation persists. Other possible side effects of pain medication are dizziness, headache, nausea, vomiting, and urinary retention. Discontinue the pain medication if you develop itching, rash, shortness of breath, or difficulties swallowing. If these symptoms become severe or are not relieved by discontinuing the medication, you should seek immediate medical attention. Refills of pain medication are authorized during office hours only (8 AM- 5 PM  Monday thru Friday). Many of these medication will require you or a family member to pick-up a physical prescription at the office.    Medications other than antiinflammatories will not be called into the pharmacy after business hours. You may resume the medication(s) you were taking prior to your surgery. Narcotics may change the effects of some antidepressant medication(s). If you have any questions about possible interactions between your regular medications and the pain medication, you should ask the pharmacist or contact the prescribing physician. If you have constipation which is not improved by oral stool softeners then a Ducolax suppository should be purchased over the counter. Continue the blood thinner (Aspirin or Lovenox) for a total of 30 days following surgery. Follow up appointment:    Please call our office at [phone] for your follow up appointment. This should be scheduled 14 days following the date of surgery. You should also have a scheduled appointment for physical therapy following surgery. Physical Therapy / Nursing:    Physical Therapy following surgery will be arranged as an outpatient or at home. They have specific instructions for rehab and wound care. It is fine to have physical therapy remove your dressing at 7 days following surgery. Returning to work:    Normal return to work is 6-12 weeks following surgery. Depending on your progression following surgery and specific job duties you may take longer for a full return to work. DRIVING    You should not return to driving until you are off all opioid pain medications and able to safely and quickly apply the brakes. This is normally 2-6 weeks for left sided surgery and 2-8 weeks for right sided surgery. Important Signs and Symptoms:    If any of the following signs or symptoms occur, you should contact Dr. Sherry Galdamez office. Please be advised if a problem arises which you feel requires immediate medical attention or you are unable to contact Dr. Sherry Galdamez office you should seek immediate medical attention at the ER or other health care facility you have access to.     A sudden increase in swelling and/or redness or warmth at the area your surgery was performed which isnt relieved by rest, ice, and elevation. Oral temperature greater than 101 degrees for 12 hours or more which isnt relieved by an increase in fluid intake and taking 2 Tylenol every 4-6 hours. Excessive drainage from your incisions, or drainage which hasnt stopped by 72 hours after your surgery. Fever, chills, shortness of breath, chest pain, nausea, vomiting or other signs and symptoms which are of concern to you. YOUR TOTAL JOINT REPLACEMENT  FREQUENTLY ASKED QUESTIONS  What should I take for pain? You will be discharged with four medications for pain (Oxycodone, Tramadol, Ibuprofen and Tylenol). These may vary slightly depending on what you were taking in the hospital.   1st Line - Tylenol Arthritis Strength - 325-650 mg every 4 hours (scheduled for the 1st 24 hours)  2nd Line -  Ibuprofen 400 (2 tabs) - 800 (4 tabs) mg every 8 hours  (scheduled for the 1st 24 hours)  3rd Line - Oxycodone 5 mg (1-2 tablets every 4-6 hrs)  4th Line - Tramadol 50 mg (1-2 tablets every 4-6 hours) - take these between Oxycodone doses if your pain is not alleviated. When should I call for advice regarding my pain? If your pain is still uncontrolled after being on the regimen above for at least 12 hours, please call the office [phone] or text / call my cell after hours [phone]. Can I get refills? Opioid refills are provided for the first 2-6 weeks following surgery. Use Tylenol 500 mg along with Aleve 220mg twice daily or Motrin 200-800mg every 4-6 hours during the daytime hours after two weeks. After two weeks, I suggest the opioid pain medications be used only 1 hour prior to your physical therapy appointment and 1 hour before sleeping at night. Use Tylenol and Ibuprofen at other times during the day. Keep in mind that you will need to discontinue opioids before you resume driving.    Is swelling normal?  Almost everyone has some degree of swelling following surgery. Following hip and knee replacement surgery, swelling can be normal below the incision for the first few weeks. This swelling peaks around 5-7 days after surgery. It is not unusual to have some bruising about the back of the thigh, calf, ankle, and foot. What should I do for the swelling? Keep the limb elevated above the level of your heart - 'Toes above Nose'. Apply compression socks (knee high for total knees and up to the mid-thigh for total hips). Use the ice packs that you are discharged home with several times a day for the first several weeks. How long should I remain on blood thinners following surgery? 30 days  Which blood thinners will I be on? Can I take them with Tylenol? Aspirin 81 mg twice daily - these should be taken with meals and can be used with Tylenol. In certain instances, you may be sent home on Lovenox for 30 days. For short periods of time (30 days), aspirin and anti-inflammatories (i.e. Aleve, Motrin / Advil / ibuprofen, diclofenac, etc. can be taken together). When can I drive? Once you have stopped using regular narcotic pain medications (Oxycodone, Percocet, Lortab, Norco etc.) and can safely apply the brakes without hesitation, (emergency braking). When can I shower? You may shower immediately if your Optifoam bandage is dry and without discharge. The Optifoam dressing should be removed 7 days following surgery, after which you may continue to shower. No submersion of the incision, bathing or swimming for 14 days following surgery or until cleared by Dr Anisa Jefferson. Can I remove this dressing? Yes, this is removed just like removing a band aid. If you are concerned, this can be removed by your therapist.   What do I do with the dressing when I shower? The Optifoam dressing is waterproof and you may shower with it.    The incision is sealed with Dermabond, a biologic skin glue, which also serves as a watertight seal. If your incision is draining, it is no longer considered to be watertight - you should contact our office prior to showering if you experience any drainage. Which dressing should I purchase after I remove my Optifoam?  An occlusive dressing which covers your entire incision. This does not have to be waterproof, but will need to be removed when you shower and then replaced. (Example Only)  How active should I be following surgery? Progress activities in moderation and at your own pace. Walking room to room in your house is encouraged. Walk each day and set progressive goals with small increments (1st week - ?block of walking, 2nd week - 1 block, 3rd week - 2 blocks, etc.)  Will I need help at home? You will likely need a caretaker who should be available for the first week following surgery. It is fine for family members to work during the day, as long as they are available by phone. Planning ahead makes coming home from the hospital a much easier transition. How long will my surgery take? On average, total joint replacement takes approximately 1-2 hour. The entire process, including pre-op and post-op care can last as long as 4- 5 hours before you are transferred to your room. stroke, pulmonary embolism (a clot going from the legs to the lungs), and even death with surgery. Will I be given antibiotics? Will I need antibiotics at discharge? Antibiotics will be given to you both before and after your procedure. To further minimize the risk of infection, we have streamlined the surgical procedure to take less time in the operating room. You do not require antibiotics following surgery.       Please do not hesitate to contact me through [employer] or by text / call me at [phone] (cell phone) for questions following surgery - MD Eyad Lemus MD  Cell [phone]  Prachi Stanton PA-C  Cell [phone]  Medical Assistants: [address] [facility] [phone]

## 2022-12-05 NOTE — ANESTHESIA PROCEDURE NOTES
Peripheral Block    Start time: 12/5/2022 6:54 AM  End time: 12/5/2022 7:02 AM  Performed by: Cristobal Garrison CRNA  Authorized by: Yash Eldridge DO       Pre-procedure:    Indications: at surgeon's request and post-op pain management    Preanesthetic Checklist: patient identified, risks and benefits discussed, site marked, timeout performed, anesthesia consent given, patient being monitored and fire risk safety assessment completed and verbalized    Timeout Time: 06:54 EST      Block Type:   Block Type:  IPACK  Laterality:  Right  Monitoring:  Continuous pulse ox, frequent vital sign checks, heart rate and responsive to questions  Injection Technique:  Single shot  Procedures: ultrasound guided    Patient Position: supine  Prep: chlorhexidine    Location:  Lower thigh  Needle Type:  Stimuplex  Needle Gauge:  21 G  Needle Localization:  Ultrasound guidance  Med Admin Time: 12/5/2022 7:02 AM    Assessment:  Number of attempts:  1  Injection Assessment:  Incremental injection every 5 mL, local visualized surrounding nerve on ultrasound, negative aspiration for blood, no paresthesia and no intravascular symptoms  Patient tolerance:  Patient tolerated the procedure well with no immediate complications

## 2022-12-05 NOTE — PROGRESS NOTES
Problem: Mobility Impaired (Adult and Pediatric)  Goal: *Acute Goals and Plan of Care (Insert Text)  Description: FUNCTIONAL STATUS PRIOR TO ADMISSION: Patient was independent and active without use of DME.    HOME SUPPORT PRIOR TO ADMISSION: The patient lived with significant other and did not require assistance. Physical Therapy Goals  Initiated 12/5/2022    1. Patient will move from supine to sit and sit to supine  in bed with modified independence within 7 days. 2. Patient will perform sit to stand with modified independence within 7 days. 3. Patient will ambulate with modified independence for 450 feet with the least restrictive device within 7 days. 4. Patient will ascend/descend 4 stairs with one handrail(s) with supervision within 7 days. 5. Patient will perform home exercise program per protocol with modified independence within 7 days. 6. Patient will demonstrate AROM 0-90 degrees in operative joint within 7 days. Outcome: Progressing Towards Goal     PHYSICAL THERAPY EVALUATION  Patient: Ally Salcido (37 y.o. male)  Date: 12/5/2022  Primary Diagnosis: Primary osteoarthritis of right knee [M17.11]  Procedure(s) (LRB):  RIGHT TOTAL KNEE ARTHROPLASTY (GERALD) (BLOCK) (Right) Day of Surgery   Precautions:   WBAT, Fall (PLEASE LIMIT OPERATIVE KNEE FLEXION TO 90 DEGREES)    ASSESSMENT  Based on the objective data described below, the patient presents with decreased R knee strength and AROM, gait dysfunction, R knee pain, and limited functional mobility on POD 0 s/p R TKA. Pt educated regarding WBAT status, ROM restrictions per MD orders, and safe/decreased activity pacing for all activities. He performs bed mobility, transfers, and ambulation as below using RW. Pt tolerates activity without complaints and VSS. Current Level of Function Impacting Discharge (mobility/balance): CGA x 2    Functional Outcome Measure:   The patient scored 55 on the Barthel outcome measure which is indicative of partially dependent status. Other factors to consider for discharge: none additional     Patient will benefit from skilled therapy intervention to address the above noted impairments. PLAN :  Recommendations and Planned Interventions: bed mobility training, transfer training, gait training, therapeutic exercises, neuromuscular re-education, modalities, edema management/control, patient and family training/education, and therapeutic activities      Frequency/Duration: Patient will be followed by physical therapy:  twice daily to address goals. Plan for discharge: (in order for the patient to meet his/her long term goals)  Determined by MD      This discharge recommendation:  Has not yet been discussed the attending provider and/or case management    IF patient discharges home will need the following DME: rolling walker         SUBJECTIVE:   Patient stated Renan Aguayo you going to help me understand what I should be doing for this first week? I really want to work on this. \"     Pt received supine, agreeable to PT and cleared by RN.       OBJECTIVE DATA SUMMARY:   HISTORY:    Past Medical History:   Diagnosis Date    Anxiety     Dyslipidemia     Hematuria     resolved per patient    HTN (hypertension)     Hypoglycemia     Insomnia     JERSEY on CPAP     Unspecified adverse effect of anesthesia     woke up in middle of surgery 2011     Past Surgical History:   Procedure Laterality Date    HX HEENT      detached retina x 2 in right eye    HX HEENT      right cataract    HX HEENT      detached retina x 3 in left eye/ left cataract surgery pending    HX HERNIA REPAIR      HX ORTHOPAEDIC Left     ankle ligament repair    HX WISDOM TEETH EXTRACTION         Personal factors and/or comorbidities impacting plan of care: as above    Home Situation  Home Environment: Private residence  # Steps to Enter: 4  Rails to Enter: Yes  Hand Rails : Left  One/Two Story Residence: Two story, live on 1st floor  Interior Rails: Both  Lift Chair Available: No  Living Alone: No  Support Systems: Spouse/Significant Other  Patient Expects to be Discharged to[de-identified] Home with outpatient services (scheduled for 12/9/22)  Current DME Used/Available at Home: Cane, straight    EXAMINATION/PRESENTATION/DECISION MAKING:   Critical Behavior:  Neurologic State: Alert  Orientation Level: Oriented X4  Cognition: Follows commands, Appropriate decision making, Appropriate for age attention/concentration, Appropriate safety awareness     Hearing: Auditory  Auditory Impairment: None  Hearing Aids/Status: Does not own  Skin:  ace wrap intact RLE without visible drainage  Edema: none noted exposed skin LEs  Range Of Motion:         AROM WFL BLEs except decreased R knee  Pt educated regarding MD instructions for surgical knee ROM flexion limitation of 90. Pt indicates understanding.                    Strength:           WFL BLEs except decreased R knee              Tone & Sensation:                Normal LE tone  Sensation intact to light touch B feet                  Coordination:   WFL       Functional Mobility:  Bed Mobility:     Supine to Sit: Supervision  Sit to Supine: Supervision  Scooting: Supervision  Transfers:  Sit to Stand: Assist x2;Contact guard assistance  Stand to Sit: Assist x2;Contact guard assistance                       Balance:   Sitting: Without support  Sitting - Static: Good (unsupported)  Sitting - Dynamic: Good (unsupported)  Standing: With support  Standing - Static: Good  Standing - Dynamic : Good  Ambulation/Gait Training:  Distance (ft): 45 Feet (ft)  Assistive Device: Walker, rolling;Gait belt  Ambulation - Level of Assistance: Contact guard assistance;Assist x2        Gait Abnormalities: Antalgic;Decreased step clearance  Right Side Weight Bearing: As tolerated        Stance: Right decreased  Speed/Amanda: Pace decreased (<100 feet/min)                     No loss of balance; appropriate RW techniques              Therapeutic Exercises: Ankle pumps x 10  Quad sets x 5  Educated regarding heel slides  Handout issued    Functional Measure:  Barthel Index:    Bathin  Bladder: 10  Bowels: 10  Groomin  Dressin  Feeding: 10  Mobility: 0  Stairs: 0  Toilet Use: 5  Transfer (Bed to Chair and Back): 10  Total: 55/100       The Barthel ADL Index: Guidelines  1. The index should be used as a record of what a patient does, not as a record of what a patient could do. 2. The main aim is to establish degree of independence from any help, physical or verbal, however minor and for whatever reason. 3. The need for supervision renders the patient not independent. 4. A patient's performance should be established using the best available evidence. Asking the patient, friends/relatives and nurses are the usual sources, but direct observation and common sense are also important. However direct testing is not needed. 5. Usually the patient's performance over the preceding 24-48 hours is important, but occasionally longer periods will be relevant. 6. Middle categories imply that the patient supplies over 50 per cent of the effort. 7. Use of aids to be independent is allowed. Score Interpretation (from [address] [address])    Independent   60-79 Minimally independent   40-59 Partially dependent   20-39 Very dependent   <20 Totally dependent     -Gabo Simental., Barthel, D.W. (1965). Functional evaluation: the Barthel Index. [address] [facility] ([address] (1997). The Barthel activities of daily living index: self-reporting versus actual performance in the old (> or = 75 years). Journal of [address] [place] [address] 45(7), [address] [facility], [facility], Leland Cowden., [facility]. (). Measuring the change in disability after inpatient rehabilitation; comparison of the responsiveness of the Barthel Index and Functional [place] Measure. Journal of Neurology, Neurosurgery, and Psychiatry, 66(4), 421-533.   Maureen Mclaughlin EMIL, RODNEY Cordova, & Michel Adkins M.A. (2004) Assessment of post-stroke quality of life in cost-effectiveness studies: The usefulness of the Barthel Index and the EuroQoL-5D. Quality of Life Research, 15, 474-68            Physical Therapy Evaluation Charge Determination   History Examination Presentation Decision-Making   HIGH Complexity :3+ comorbidities / personal factors will impact the outcome/ POC  HIGH Complexity : 4+ Standardized tests and measures addressing body structure, function, activity limitation and / or participation in recreation  LOW Complexity : Stable, uncomplicated  Other outcome measures barthel  MEDIUM      Based on the above components, the patient evaluation is determined to be of the following complexity level: MEDIUM    Pain Ratin/10 (NPS) surgical joint  Offered cold pack,elevation, repositioning, RN notified    Activity Tolerance:   Good    After treatment patient left in no apparent distress:   Supine in bed, Call bell within reach, Bed / chair alarm activated, Caregiver / family present, Side rails x 3, and foot pumps in place, foam wedge for RLE elevation, cold pack to R knee with skin protected. COMMUNICATION/EDUCATION:   The patients plan of care was discussed with: Registered nurse and Rehabilitation technician. Fall prevention education was provided and the patient/caregiver indicated understanding., Patient/family have participated as able in goal setting and plan of care. , and Patient/family agree to work toward stated goals and plan of care.     Thank you for this referral.  Lydia Sprague, PT, DPT   Time Calculation:15 + 25 mins

## 2022-12-05 NOTE — ANESTHESIA PREPROCEDURE EVALUATION
Relevant Problems   No relevant active problems       Anesthetic History   No history of anesthetic complications            Review of Systems / Medical History  Patient summary reviewed, nursing notes reviewed and pertinent labs reviewed    Pulmonary        Sleep apnea: CPAP           Neuro/Psych         Psychiatric history    Comments: Anxiety/depression  Chronic insomnia Cardiovascular    Hypertension: well controlled          Hyperlipidemia    Exercise tolerance: >4 METS     GI/Hepatic/Renal  Within defined limits              Endo/Other        Arthritis     Other Findings              Physical Exam    Airway  Mallampati: III    Neck ROM: normal range of motion   Mouth opening: Normal     Cardiovascular    Rhythm: regular  Rate: normal         Dental  No notable dental hx       Pulmonary  Breath sounds clear to auscultation               Abdominal         Other Findings            Anesthetic Plan    ASA: 2  Anesthesia type: spinal and regional - IPACK block            Anesthetic plan and risks discussed with: Patient      Informed consent obtained.

## 2022-12-05 NOTE — OP NOTES
OPERATIVE REPORT     Admit Date: 12/5/2022  Admit Diagnosis: Primary osteoarthritis of right knee [M17.11]    Date of Procedure: 12/5/2022   Preoperative Diagnosis: DJD RIGHT KNEE  Postoperative Diagnosis: DJD RIGHT KNEE    Procedure: Procedure(s):  RIGHT TOTAL KNEE ARTHROPLASTY (GERALD) (BLOCK)  Surgeon: Severa Hidden, MD  Assistant(s): Alison Starr PA-C  Anesthesia: Spinal   Estimated Blood Loss: 300cc  Specimens: * No specimens in log *   Complications: None       INDICATIONS:   The patient is a 65 y.o., male who has complained of a long history of knee pain. The patient  has failed conservative treatment and presents for definitive operative care. Informed consent obtained including a discussion of the risks and benefits, which include, but are not limited to, bleeding, infection, neurovascular damage, wound complications, pain and stiffness in the knee, periprosthetic loosening, fracture dislocation and DVT, the patient consented for the procedure. DESCRIPTION OF PROCEDURE:        The patient was seen in the preoperative holding area. The patient was positively identified. The limb was initialed,  questions were answered. The patient was given Ancef preop for an antibiotic. The patient was subsequently taken to the operating room. The patient underwent spinal anesthesia. The patient was positioned in the supine position. All bony prominences were well padded. The limb was prepped and draped in a sterile fashion. The appropriate pause for safety was performed. A mid-line incision was created. Utilizing an incision from above the superior pole of the patella distally to the tibial tubercle. The incision was taken down through the skin and subcutaneous tissue until the retinaculum could be identified. This was sharply incised utilizing a medial parapatellar incision. The femoral array was placed at the superior portion of the patella.  The patellar was everted, a planar resurfacing was performed and the drill guide was placed with the appropriate rotation. The medial-based soft tissue was then retracted as well as well as the lateral tissue. Juan J pins were placed within the incision for thetibial array (one hand breadth proximal to the superior pole of the patella). The femoral and tibial trackers were placed. The hip center or rotation was established. Registration was performed on the femur and tibia. Osteophytes were removed. The knee was balanced in both flexion and extension with force applied. The computer model of implants and position was verified. Once this was complete the MAKOplasty robot was positioned. Standard cuts were made for the tibia first. The tibial cut was removed. The remaining femoral cuts were made with the robot. The blade was changed and the medial meniscus was removed. The tibial preparation was completed. Including removal of residual medial and lateral mensci. Tibial baseplate placement and keel preparation were performed along with drill holes for the tibial baseplate. Final bony osteophytes were removed and the meniscal rim was removed. The knee was injected with 60cc of Morphine / Ketoralac and Lidocaine. Trial implants were placed and range of motion along with overall fit was established with very good integrity of the MCL noted. The juan j pins and trackers were removed and accounted for prior to closure. All the bony surfaces were irrigated. The tibia was placed first, then the poly, residual osteophytes were removed and then the femur. Finally, the patella was placed and press-fit with the clamp / impaction. There was normal tracking of the patella at the conclusion of the case. The knee was very stable after it was taken through a full range of motion. The wound was closed with 0 Vicryl and then number 2 Quill proximally. Once this had been completed, the skin was closed with 2-0 Vicryl 4-0 monocryl suture and Dermabond.  A sterile dressing was applied. The patient was taken from the operating room in stable condition. OPERATIVE FINDINGS : Moderate to severe varus OA, Final ROM 0-130    IMPLANTS :   Implant Name Type Inv. Item Serial No.  Lot No. LRB No. Used Action   COMPONENT FEM SZ 6 R KNEE CRUCE RET CEMENTLESS BEAD W/ WILFRID - SNA  COMPONENT FEM SZ 6 R KNEE CRUCE RET CEMENTLESS BEAD W/ WILFRID NA [employer] [id number] Right 1 Implanted   BASEPLATE TIB SZ 7 [id number] ML80MM KNEE TRITANIUM 4 CRUCFRM - SNA  BASEPLATE TIB SZ 7 [id number] ML80MM KNEE TRITANIUM 4 CRUCFRM NA [employer] [id number] Right 1 Implanted   COMPONENT PAT [id number] SUPERIOR/INFERIOR KNEE - SNA  COMPONENT PAT [id number] SUPERIOR/INFERIOR KNEE NA [employer] [id number] Right 1 Implanted   TRIATHLON X3 TIBIAL BEARING INSERT CR   NA [employer] [id number] Right 1 Implanted       POST OPERATIVE CONSIDERATIONS :  WBAT    JUSTIFICATION FOR SURGICAL ASSISTANT:   Surgical Assistant, was requried and necessary in this case, to help with soft tissue retraction, extremity positioning, equiment management, implant management, and wound closure.     Nancy Lowry MD

## 2022-12-05 NOTE — ANESTHESIA PROCEDURE NOTES
Spinal Block    Start time: 12/5/2022 7:10 AM  End time: 12/5/2022 7:15 AM  Performed by: Gene Koo CRNA  Authorized by: Gene Koo CRNA     Pre-procedure:   Indications: at surgeon's request and primary anesthetic  Preanesthetic Checklist: patient identified, risks and benefits discussed, anesthesia consent, site marked, patient being monitored, timeout performed and fire risk safety assessment completed and verbalized      Spinal Block:   Patient Position:  Seated  Prep Region:  Lumbar  Prep: DuraPrep      Location:  L3-4  Technique:  Single shot      Med Admin Time: 12/5/2022 7:15 AM    Needle:   Needle Type:  Pencan  Needle Gauge:  25 G  Attempts:  1      Events: CSF confirmed and no blood with aspiration        Assessment:  Insertion:  Uncomplicated  Patient tolerance:  Patient tolerated the procedure well with no immediate complications

## 2022-12-05 NOTE — PERIOP NOTES
TRANSFER - OUT REPORT:    Verbal report given to Frantz Jiang RN on Lexy Harris  being transferred to 4th floor for routine post - op       Report consisted of patients Situation, Background, Assessment and   Recommendations(SBAR). Information from the following report(s) SBAR was reviewed with the receiving nurse. Lines:   Peripheral IV 12/05/22 Left Wrist (Active)   Site Assessment Clean, dry, & intact 12/05/22 1352   Phlebitis Assessment 0 12/05/22 1352   Infiltration Assessment 0 12/05/22 1352   Dressing Status Clean, dry, & intact 12/05/22 1352   Dressing Type Tape;Transparent 12/05/22 1352   Hub Color/Line Status Pink;Patent; Infusing 12/05/22 1352        Opportunity for questions and clarification was provided.       Patient transported with:   Registered Nurse

## 2022-12-06 VITALS
BODY MASS INDEX: 31.95 KG/M2 | TEMPERATURE: 98 F | DIASTOLIC BLOOD PRESSURE: 74 MMHG | OXYGEN SATURATION: 98 % | RESPIRATION RATE: 16 BRPM | HEIGHT: 72 IN | WEIGHT: 235.89 LBS | SYSTOLIC BLOOD PRESSURE: 120 MMHG | HEART RATE: 75 BPM

## 2022-12-06 LAB
ANION GAP SERPL CALC-SCNC: 2 MMOL/L (ref 5–15)
BUN SERPL-MCNC: 30 MG/DL (ref 6–20)
BUN/CREAT SERPL: 30 (ref 12–20)
CALCIUM SERPL-MCNC: 8.7 MG/DL (ref 8.5–10.1)
CHLORIDE SERPL-SCNC: 107 MMOL/L (ref 97–108)
CO2 SERPL-SCNC: 31 MMOL/L (ref 21–32)
CREAT SERPL-MCNC: 1 MG/DL (ref 0.7–1.3)
GLUCOSE SERPL-MCNC: 102 MG/DL (ref 65–100)
HGB BLD-MCNC: 13.2 G/DL (ref 12.1–17)
POTASSIUM SERPL-SCNC: 4.2 MMOL/L (ref 3.5–5.1)
SODIUM SERPL-SCNC: 140 MMOL/L (ref 136–145)

## 2022-12-06 PROCEDURE — 74011250637 HC RX REV CODE- 250/637: Performed by: PHYSICIAN ASSISTANT

## 2022-12-06 PROCEDURE — 97165 OT EVAL LOW COMPLEX 30 MIN: CPT

## 2022-12-06 PROCEDURE — 97116 GAIT TRAINING THERAPY: CPT

## 2022-12-06 PROCEDURE — G0378 HOSPITAL OBSERVATION PER HR: HCPCS

## 2022-12-06 PROCEDURE — 74011250636 HC RX REV CODE- 250/636: Performed by: PHYSICIAN ASSISTANT

## 2022-12-06 PROCEDURE — 97530 THERAPEUTIC ACTIVITIES: CPT

## 2022-12-06 PROCEDURE — 85018 HEMOGLOBIN: CPT

## 2022-12-06 PROCEDURE — 36415 COLL VENOUS BLD VENIPUNCTURE: CPT

## 2022-12-06 PROCEDURE — 80048 BASIC METABOLIC PNL TOTAL CA: CPT

## 2022-12-06 PROCEDURE — 97535 SELF CARE MNGMENT TRAINING: CPT

## 2022-12-06 PROCEDURE — 74011000250 HC RX REV CODE- 250: Performed by: PHYSICIAN ASSISTANT

## 2022-12-06 RX ADMIN — KETOROLAC TROMETHAMINE 15 MG: 30 INJECTION, SOLUTION INTRAMUSCULAR; INTRAVENOUS at 01:27

## 2022-12-06 RX ADMIN — ACETAMINOPHEN 650 MG: 325 TABLET ORAL at 12:04

## 2022-12-06 RX ADMIN — HYDROCHLOROTHIAZIDE: 25 TABLET ORAL at 08:49

## 2022-12-06 RX ADMIN — OXYCODONE 10 MG: 5 TABLET ORAL at 01:26

## 2022-12-06 RX ADMIN — KETOROLAC TROMETHAMINE 15 MG: 30 INJECTION, SOLUTION INTRAMUSCULAR; INTRAVENOUS at 12:04

## 2022-12-06 RX ADMIN — OXYCODONE 10 MG: 5 TABLET ORAL at 12:04

## 2022-12-06 RX ADMIN — SODIUM CHLORIDE, PRESERVATIVE FREE 10 ML: 5 INJECTION INTRAVENOUS at 05:49

## 2022-12-06 RX ADMIN — KETOROLAC TROMETHAMINE 15 MG: 30 INJECTION, SOLUTION INTRAMUSCULAR; INTRAVENOUS at 05:41

## 2022-12-06 RX ADMIN — ACETAMINOPHEN 650 MG: 325 TABLET ORAL at 05:42

## 2022-12-06 RX ADMIN — FAMOTIDINE 20 MG: 20 TABLET, FILM COATED ORAL at 08:49

## 2022-12-06 RX ADMIN — CEFAZOLIN 2 G: 1 INJECTION, POWDER, FOR SOLUTION INTRAMUSCULAR; INTRAVENOUS at 01:27

## 2022-12-06 RX ADMIN — ASPIRIN 81 MG: 81 TABLET, COATED ORAL at 08:50

## 2022-12-06 RX ADMIN — ACETAMINOPHEN 650 MG: 325 TABLET ORAL at 01:26

## 2022-12-06 RX ADMIN — OXYCODONE 10 MG: 5 TABLET ORAL at 05:48

## 2022-12-06 RX ADMIN — OXYCODONE 10 MG: 5 TABLET ORAL at 09:05

## 2022-12-06 RX ADMIN — ESCITALOPRAM OXALATE 10 MG: 10 TABLET ORAL at 08:50

## 2022-12-06 RX ADMIN — SENNOSIDES AND DOCUSATE SODIUM 1 TABLET: 50; 8.6 TABLET ORAL at 08:49

## 2022-12-06 RX ADMIN — THERA TABS 1 TABLET: TAB at 08:49

## 2022-12-06 NOTE — PROGRESS NOTES
Medicare Outpatient Observation Notice (MOON)/ [place] Outpatient Observation Notice (Meena Tucker) provided to patient/representative with verbal explanation of the notice. Time allotted for questions regarding the notice. Patient /representative provided a completed copy of the MOON/VOON notice. Copy placed on bedside chart.   Henri Noriega CMS

## 2022-12-06 NOTE — PROGRESS NOTES
12/6/2022 1:24 PM Case management consult for discharge planning and rw received. Care Management Assessment      Reason for Admission: Elective surgery      ICD-10-CM ICD-9-CM    1. Primary osteoarthritis of right knee  M17.11 715.16 pregabalin (Lyrica) 75 mg capsule      oxyCODONE IR (ROXICODONE) 5 mg immediate release tablet      traMADoL (ULTRAM) 50 mg tablet          Assessment:   [x]In person with pt   Charted address and phone numbers confirmed. RUR: n/a under Obs   Risk Level: [x]Low []Moderate []High  Value-based purchasing: [] Yes [x] No    Advance Directive: Full Code. [x] No AD on file. [] AD on file. [] Current AD not on file. Copy requested. [] Requests AD, and referral submitted to [facility]. Healthcare Decision Maker:         Assessment:    Age: 65 y.o. Sex: [x] Male []Female     Residency: [x]Private residence []Apartment []Assisted Living []LTC []Other:   Exterior Steps: 4  Interior Steps: lives on 1st floor    Lives With: [x]With spouse []Other family members []Underage children []Alone []Care provider []Other:    Prior functioning:  [x]Independent with ADLs and iADLS []Dependent with ADLs and iADLs []Partial dependence, Specify:     Cognition: [x]Intact []Some spheres some of the time. []Some spheres all of the time. []All spheres all of the time. Prior transportation method: [x]Self []Spouse []Other family members []Medicaid transport []Other:     Prior DME required:  [x]None []RW []Cane []Crutches []Bedside commode []CPAP []Home O2 (Liter/Provider: ) []Nebulizer   []Shower Chair []Wheelchair []Hospital Bed []Amina []Stair lift []Rollator []Other:    RW ordered and delivered to pt through [facility] dme closet. Referral was sent through [employer] and approved.      Rehab history: []None [x]Outpatient PT []Home Health (Provider/Date: ) []SNF (Provider/Date: ) []IPR (Provider/Date: ) []LTC (Provider/Date: ) []Hospice (Provider/Date: )  []Other: Covid vaccination status:   [] Yes, Type:  [] Booster 1 [] Booster 2  [] No  [x] N/A / Not asked    Insurer:   Insurance Information                  [place]/NADER Ram Phone: --    Subscriber: Angeles Kain Subscriber#: [id number]    Group#: [id number] Precert#: --            PCP: Zachery Cohen   Address: [facility] / [address]   Phone number: [phone]   Current patient: [x]Yes []No   Approximate date of last visit: Last week   Access to virtual PCP visits: []Yes [x]No    Pharmacy: [address] [place] [address] Transport: Pt's girlfriend, Sidney Sharp        Transition of care plan:    [x] Home with Outpatient PT and outpatient follow-up   Pt aware of OP appt? [x]Yes, Provider: Leidy Iqbal starting on 12/9, pt arranged appt prior to admission. EDWARD Forbes    Care Management Interventions  PCP Verified by CM:  Yes  Transition of Care Consult (CM Consult): Discharge Planning  MyChart Signup: No  Discharge Durable Medical Equipment: Yes  Physical Therapy Consult: Yes  Occupational Therapy Consult: Yes  Speech Therapy Consult: No  Support Systems: Spouse/Significant Other  Discharge Location  Patient Expects to be Discharged to[de-identified] Home with outpatient services

## 2022-12-06 NOTE — PROGRESS NOTES
Problem: Mobility Impaired (Adult and Pediatric)  Goal: *Acute Goals and Plan of Care (Insert Text)  Description: FUNCTIONAL STATUS PRIOR TO ADMISSION: Patient was independent and active without use of DME.    HOME SUPPORT PRIOR TO ADMISSION: The patient lived with significant other and did not require assistance. Physical Therapy Goals  Initiated 12/5/2022    1. Patient will move from supine to sit and sit to supine  in bed with modified independence within 7 days. 2. Patient will perform sit to stand with modified independence within 7 days. 3. Patient will ambulate with modified independence for 450 feet with the least restrictive device within 7 days. 4. Patient will ascend/descend 4 stairs with one handrail(s) with supervision within 7 days. 5. Patient will perform home exercise program per protocol with modified independence within 7 days. 6. Patient will demonstrate AROM 0-90 degrees in operative joint within 7 days. Outcome: Progressing Towards Goal   PHYSICAL THERAPY TREATMENT  Patient: Dorothea Mason (67 y.o. male)  Date: 12/6/2022  Diagnosis: Primary osteoarthritis of right knee [M17.11] <principal problem not specified>  Procedure(s) (LRB):  RIGHT TOTAL KNEE ARTHROPLASTY (GERALD) (BLOCK) (Right) 1 Day Post-Op  Precautions: WBAT, Fall (PLEASE LIMIT OPERATIVE KNEE FLEXION TO 90 DEGREES)  Chart, physical therapy assessment, plan of care and goals were reviewed. ASSESSMENT  Patient continues with skilled PT services and is progressing towards goals. Communicated with nurse cleared for therapy. Patient already up on the chair when received,  sit to stand modified independent, ambulate with rolling walker modified independent in the room and out on the 4th floor hallway,  up and down stairs modified  independent. Steady sitting and standing balance  no  loss of balance reviewed TKA exercise protocol verbalized understanding.  Patient cleared per Physical Therapy perspective once medically stable. Communicated with nurse and  who agreed to monitor patient. Current Level of Function Impacting Discharge (mobility/balance): modified independent with rolling walker for ambulation and transfers    Other factors to consider for discharge: none         PLAN :  Patient continues to benefit from skilled intervention to address the above impairments. Continue treatment per established plan of care. to address goals. Recommendation for discharge: (in order for the patient to meet his/her long term goals)  Outpatient physical therapy follow up recommended for TKA    This discharge recommendation:  Has been made in collaboration with the attending provider and/or case management    IF patient discharges home will need the following DME: rolling walker has been delivered for discharge       SUBJECTIVE:   Patient stated Lori Lazier to go  home.     OBJECTIVE DATA SUMMARY:   Critical Behavior:  Neurologic State: Alert  Orientation Level: Oriented X4  Cognition: Follows commands     Functional Mobility Training:  Bed Mobility:  Rolling: Modified independent  Supine to Sit: Modified independent  Sit to Supine: Modified independent  Scooting: Modified independent        Transfers:  Sit to Stand: Modified independent  Stand to Sit: Modified independent  Stand Pivot Transfers: Modified independent     Bed to Chair: Modified independent                    Balance:  Sitting: Intact  Sitting - Static: Good (unsupported)  Sitting - Dynamic: Good (unsupported)  Standing: Intact; With support  Standing - Static: Good  Standing - Dynamic : Good  Ambulation/Gait Training:  Distance (ft): 200 Feet (ft)  Assistive Device: Walker, rolling;Gait belt  Ambulation - Level of Assistance: Modified independent     Gait Description (WDL): Within defined limits  Gait Abnormalities: Antalgic  Right Side Weight Bearing: As tolerated           Speed/Amanda: Pace decreased (<100 feet/min)                       Stairs:  Number of Stairs Trained: 4  Stairs - Level of Assistance: Modified independent        Therapeutic Exercises:   Educate and instructed patient to continue active range of motion exercise on both legs while up on chair or on bed multiple times. Recommend patient to be up on the chair at least 3 times a day every meal times as tolerated. Pain Ratin/10    Activity Tolerance:   Good    After treatment patient left in no apparent distress:   Sitting in chair, Heels elevated for pressure relief, Call bell within reach, and Bed / chair alarm activated    COMMUNICATION/COLLABORATION:   The patients plan of care was discussed with: Occupational therapist, Registered nurse, and Case management. Nadia Mcnally PT,WCC.    Time Calculation: 38 mins

## 2022-12-06 NOTE — PROGRESS NOTES
OCCUPATIONAL THERAPY EVALUATION/DISCHARGE  Patient: Rl Blackman (18 y.o. male)  Date: 12/6/2022  Primary Diagnosis: Primary osteoarthritis of right knee [M17.11]  Procedure(s) (LRB):  RIGHT TOTAL KNEE ARTHROPLASTY (GINNY) (BLOCK) (Right) 1 Day Post-Op   Precautions:   WBAT, Fall (PLEASE LIMIT OPERATIVE KNEE FLEXION TO 90 DEGREES)    ASSESSMENT  Patient received semi supine in bed A&OX4 and agreeable for OT eval/tx as patient is s/p right total knee arthroplasty ginny (12/5/2022 by Dr. Jerry Blackwell and is WBAT with knee flexion limitation of 90 degrees). Per pt report, pt lives with girlfriend in two story home (will be on first level) with 4 steps left rail to enter home and is independent for self care and functional transfers/mobility (has built in shower chair, raised toilet seat frame and SPC). Patient educated on LB dressing technique of surgery leg in first out last and LB knee flexion limitation of 90 degrees with pt demonstrating and verbalizing understanding. Patient presents with decreased balance (intact with use of RW) and increased need for assist with self care (min A nicole socks 2/2 pain, SBA grooming standing sink, SBA toileting/cloth mgmt, SBA nicole pants sitting/standing, independent UB dressing) and functional transfers/mobility (MI sup->sit and scooting EOB, SBA sit<->stand and toilet transfer with Rw and gait belt). Patient with no acute OT needs at this time thus will D/C from skilled OT services after eval. Recommend discharge to home independently when medically appropriate. Functional Outcome Measure: The patient scored 80/100 on the Barthel Index outcome measure   Other factors to consider for discharge: time since onset, close to baseline     PLAN :  Recommend with staff: assist with toilet transfers    Recommendation for discharge: (in order for the patient to meet his/her long term goals)  No skilled occupational therapy/ follow up rehabilitation needs identified at this time.     This discharge recommendation:  Has been made in collaboration with the attending provider and/or case management    IF patient discharges home will need the following DME: none       SUBJECTIVE:   Patient stated yes.  when asked if pt will have assist for socks at home upon discharge    OBJECTIVE DATA SUMMARY:   HISTORY:   Past Medical History:   Diagnosis Date    Anxiety     Dyslipidemia     Hematuria     resolved per patient    HTN (hypertension)     Hypoglycemia     Insomnia     JERSEY on CPAP     Unspecified adverse effect of anesthesia     woke up in middle of surgery 2011     Past Surgical History:   Procedure Laterality Date    HX HEENT      detached retina x 2 in right eye    HX HEENT      right cataract    HX HEENT      detached retina x 3 in left eye/ left cataract surgery pending    HX HERNIA REPAIR      HX ORTHOPAEDIC Left     ankle ligament repair    HX WISDOM TEETH EXTRACTION         Prior Level of Function/Environment/Context: Independent for self care and functional transfers/mobility  Expanded or extensive additional review of patient history:   Home Situation  Home Environment: Private residence  # Steps to Enter: 4  Rails to Enter: Yes  Hand Rails : Left  One/Two Story Residence: Two story, live on 1st floor  Interior Rails: Both  Lift Chair Available: No  Living Alone: No  Support Systems: Spouse/Significant Other  Patient Expects to be Discharged to[de-identified] Home with outpatient services (scheduled for 12/9/22)  Current DME Used/Available at Home: [address] [facility], straight (built in shower seat, raised toilet frame)  Tub or Shower Type: Shower    EXAMINATION OF PERFORMANCE DEFICITS:  Cognitive/Behavioral Status:  Neurologic State: Alert  Orientation Level: Oriented X4     Hearing:   Auditory  Auditory Impairment: None  Hearing Aids/Status: Does not own    Range of Motion:  Kareem UE WFL     Strength:  Kareem UE WFL     Balance:  Sitting: Intact  Sitting - Static: Good (unsupported)  Sitting - Dynamic: Good (unsupported)  Standing: Intact; With support  Standing - Static: Constant support;Good  Standing - Dynamic : Constant support;Good    Functional Mobility and Transfers for ADLs:  Bed Mobility:  Supine to Sit: Modified independent  Scooting: Modified independent    Transfers:  Sit to Stand: Stand-by assistance  Stand to Sit: Stand-by assistance  Bed to Chair: Stand-by assistance  Bathroom Mobility: Stand-by assistance  Toilet Transfer : Stand-by assistance  Assistive Device : Gait Belt;Walker, rolling    ADL Assessment:     Oral Facial Hygiene/Grooming: Stand-by assistance    Upper Body Dressing: Independent    Lower Body Dressing: Stand-by assistance (SBA pants, min A socks (pt stating significant other can assist))    Toileting: Stand by assistance     ADL Intervention and task modifications:     Grooming  Grooming Assistance: Stand-by assistance  Position Performed: Standing  Washing Face: Stand-by assistance  Washing Hands: Stand-by assistance  Brushing Teeth: Stand-by assistance  Brushing/Combing Hair: Stand-by assistance    Upper Body Dressing Assistance  Pullover Shirt: Independent    Lower Body Dressing Assistance  Pants With Elastic Waist: Stand-by assistance  Socks: Minimum assistance  Position Performed: Seated edge of bed;Standing    Toileting  Toileting Assistance: Stand-by assistance  Bladder Hygiene: Stand-by assistance  Bowel Hygiene: Stand-by assistance  Clothing Management: Stand-by assistance     Functional Measure:    Barthel Index:  Bathin (with built in shower chair)  Bladder: 10  Bowels: 10  Groomin  Dressin (assist with socks only 2/2 pain with knee flexion on right at this time)  Feeding: 10  Mobility: 10  Stairs: 5  Toilet Use: 10  Transfer (Bed to Chair and Back): 10  Total: 80/100      The Barthel ADL Index: Guidelines  1. The index should be used as a record of what a patient does, not as a record of what a patient could do.   2. The main aim is to establish degree of independence from any help, physical or verbal, however minor and for whatever reason. 3. The need for supervision renders the patient not independent. 4. A patient's performance should be established using the best available evidence. Asking the patient, friends/relatives and nurses are the usual sources, but direct observation and common sense are also important. However direct testing is not needed. 5. Usually the patient's performance over the preceding 24-48 hours is important, but occasionally longer periods will be relevant. 6. Middle categories imply that the patient supplies over 50 per cent of the effort. 7. Use of aids to be independent is allowed. Score Interpretation (from [facility] ([place], [place]) 83)    Independent   60-79 Minimally independent   40-59 Partially dependent   20-39 Very dependent   <20 Totally dependent     -Gabo Simental., Barthel, D.W. (1965). Functional evaluation: the Barthel Index. [address] [facility] ([address] [place] [address] (1997). The Barthel activities of daily living index: self-reporting versus actual performance in the old (> or = 75 years). Journal [address] 45(7), [facility], J..M.F, Goran Pressley., [facility]. (1999). Measuring the change in disability after inpatient rehabilitation; comparison of the responsiveness of the Barthel Index and Functional [place] Measure. Journal of Neurology, Neurosurgery, and Psychiatry, 66(4), 893-186. HAN Neal.JOSE RAMON, RODNEY Cordova, & Rosemary Hoover, M.A. (2004) Assessment of post-stroke quality of life in cost-effectiveness studies: The usefulness of the Barthel Index and the EuroQoL-5D.  Quality of Life Research, 15, 359-98         Occupational Therapy Evaluation Charge Determination   History Examination Decision-Making   LOW Complexity : Brief history review  LOW Complexity : 1-3 performance deficits relating to physical, cognitive , or psychosocial skils that result in activity limitations and / or participation restrictions  LOW Complexity : No comorbidities that affect functional and no verbal or physical assistance needed to complete eval tasks       Based on the above components, the patient evaluation is determined to be of the following complexity level: LOW   Pain Ratin/10 right knee pain at rest increasing to 6/10 with activity    Activity Tolerance:   Good    After treatment patient left in no apparent distress:    Sitting in chair and Call bell within reach    COMMUNICATION/EDUCATION:   The patients plan of care was discussed with: Physical therapist and Registered nurse.      Thank you for this referral.  Ruiz Renee  Time Calculation: 26 mins

## 2022-12-06 NOTE — PROGRESS NOTES
TOTAL KNEE ARTHROPLASTY DAILY NOTE     ASSESSMENT / PLAN :   Pain Control : Acceptable - Some pain at times, but the patient does not wish to increase their medications  Overnight Issues : none  Wound or incisional issue : Healing incision with no visible drainage  Therapy / Weight Bearing Recommendations : Weight bear as tolerated with use of a walker and two person assist while mobilizing  DVT Prophylaxis : Mechanical and Aspirin and mechanical lower extremity compression device  Disposition : Home - outpatient PT  Medical Concerns : none - stable  Comments : Discharge home today after cleared by PT     POD  1 Day Post-Op s/p Procedure(s):  RIGHT TOTAL KNEE ARTHROPLASTY (GERALD) (BLOCK)     SUBJECTIVE :     Overnight complaints : none. OBJECTIVE :     Vitals:    12/05/22 1621 12/05/22 2022 12/05/22 2257 12/06/22 0334   BP: 125/67 112/62 97/60 114/76   Pulse: (!) 56 68 62 69   Resp:  16 16 16   Temp:  97.8 °F (36.6 °C) 97.9 °F (36.6 °C) 97.8 °F (36.6 °C)   SpO2: 98% 94% 98% 97%   Weight:       Height:           Alert and oriented x3. Examination of the right knee reveals that the dressing is clean, dry and intact. Motor Exam : Pt is able to perform a straight leg raise. Sensation is intact to light touch. No calf pain. MEDS / LABS :     Key Anti-Platelet Anticoagulant Meds               aspirin delayed-release 81 mg tablet (Taking) Take 1 Tablet by mouth two (2) times a day.              Labs:  Recent Labs     12/06/22  0532   HGB 13.2      K 4.2      CO2 31   BUN 30*   CREA 1.00   *      Patient mobility  Gait  Speed/Amanda: Pace decreased (<100 feet/min)  Stance: Right decreased  Gait Abnormalities: Antalgic, Decreased step clearance  Ambulation - Level of Assistance: Contact guard assistance, Assist x2  Distance (ft): 45 Feet (ft)  Assistive Device: Walker, rolling, Gait belt        Juan Rothman MD  Cell [phone]  Kirill Mendenhall PA-C  Cell [phone]  Medical Assistants: Jannette Barragan [phone]                 Surgery Scheduler: ILZZY Galicia [phone] ext [phone]

## 2023-05-24 RX ORDER — ACETAMINOPHEN 500 MG
1000 TABLET ORAL EVERY 6 HOURS PRN
COMMUNITY
Start: 2022-12-05

## 2023-05-24 RX ORDER — ESCITALOPRAM OXALATE 10 MG/1
10 TABLET ORAL EVERY MORNING
COMMUNITY

## 2023-05-24 RX ORDER — PREGABALIN 75 MG/1
75 CAPSULE ORAL
COMMUNITY
Start: 2022-12-05

## 2023-05-24 RX ORDER — ASPIRIN 81 MG/1
81 TABLET ORAL 2 TIMES DAILY
COMMUNITY
Start: 2022-12-05

## 2023-05-24 RX ORDER — IBUPROFEN 800 MG/1
800 TABLET ORAL EVERY 6 HOURS PRN
COMMUNITY
Start: 2022-12-05

## 2023-05-24 RX ORDER — SIMVASTATIN 20 MG
20 TABLET ORAL NIGHTLY
COMMUNITY

## 2023-05-24 RX ORDER — ONDANSETRON 8 MG/1
4 TABLET, ORALLY DISINTEGRATING ORAL EVERY 8 HOURS PRN
COMMUNITY
Start: 2022-12-05

## 2023-05-24 RX ORDER — TRAZODONE HYDROCHLORIDE 100 MG/1
100 TABLET ORAL NIGHTLY
COMMUNITY

## 2023-10-09 ENCOUNTER — HOSPITAL ENCOUNTER (OUTPATIENT)
Facility: HOSPITAL | Age: 63
Discharge: HOME | End: 2023-10-12
Attending: ORTHOPAEDIC SURGERY
Payer: COMMERCIAL

## 2023-10-09 DIAGNOSIS — M25.561 RIGHT KNEE PAIN, UNSPECIFIED CHRONICITY: ICD-10-CM

## 2023-10-09 PROCEDURE — 73700 CT LOWER EXTREMITY W/O DYE: CPT

## 2023-11-21 ENCOUNTER — HOSPITAL ENCOUNTER (OUTPATIENT)
Facility: HOSPITAL | Age: 63
Discharge: HOME | End: 2023-11-24
Attending: ORTHOPAEDIC SURGERY
Payer: COMMERCIAL

## 2023-11-21 DIAGNOSIS — Z96.651 PRESENCE OF RIGHT ARTIFICIAL KNEE JOINT: ICD-10-CM

## 2023-11-21 PROCEDURE — A9503 TC99M MEDRONATE: HCPCS | Performed by: ORTHOPAEDIC SURGERY

## 2023-11-21 PROCEDURE — 78300 BONE IMAGING LIMITED AREA: CPT

## 2023-11-21 PROCEDURE — 3430000000 HC RX DIAGNOSTIC RADIOPHARMACEUTICAL: Performed by: ORTHOPAEDIC SURGERY

## 2023-11-21 RX ORDER — TC 99M MEDRONATE 20 MG/10ML
20 INJECTION, POWDER, LYOPHILIZED, FOR SOLUTION INTRAVENOUS ONCE
Status: COMPLETED | OUTPATIENT
Start: 2023-11-21 | End: 2023-11-21

## 2023-11-21 RX ADMIN — TC 99M MEDRONATE 20 MILLICURIE: 20 INJECTION, POWDER, LYOPHILIZED, FOR SOLUTION INTRAVENOUS at 11:53

## 2023-12-06 ENCOUNTER — HOSPITAL ENCOUNTER (OUTPATIENT)
Facility: HOSPITAL | Age: 63
Discharge: HOME | End: 2023-12-09
Payer: COMMERCIAL

## 2023-12-06 VITALS
WEIGHT: 239.42 LBS | RESPIRATION RATE: 16 BRPM | BODY MASS INDEX: 32.43 KG/M2 | OXYGEN SATURATION: 97 % | HEIGHT: 72 IN | HEART RATE: 83 BPM | TEMPERATURE: 97.1 F | DIASTOLIC BLOOD PRESSURE: 71 MMHG | SYSTOLIC BLOOD PRESSURE: 132 MMHG

## 2023-12-06 PROBLEM — T84.038A LOOSENING OF KNEE JOINT PROSTHESIS (HCC): Status: ACTIVE | Noted: 2023-12-06

## 2023-12-06 PROBLEM — Z96.659 LOOSENING OF KNEE JOINT PROSTHESIS (HCC): Status: ACTIVE | Noted: 2023-12-06

## 2023-12-06 LAB
ABO + RH BLD: NORMAL
ALBUMIN SERPL-MCNC: 4.3 G/DL (ref 3.5–5)
ALBUMIN/GLOB SERPL: 1.3 (ref 1.1–2.2)
ALP SERPL-CCNC: 63 U/L (ref 45–117)
ALT SERPL-CCNC: 35 U/L (ref 12–78)
ANION GAP SERPL CALC-SCNC: 4 MMOL/L (ref 5–15)
APPEARANCE UR: CLEAR
AST SERPL-CCNC: 21 U/L (ref 15–37)
BACTERIA URNS QL MICRO: NEGATIVE /HPF
BASOPHILS # BLD: 0 K/UL (ref 0–0.1)
BASOPHILS NFR BLD: 0 % (ref 0–1)
BILIRUB SERPL-MCNC: 0.4 MG/DL (ref 0.2–1)
BILIRUB UR QL: NEGATIVE
BLOOD GROUP ANTIBODIES SERPL: NORMAL
BUN SERPL-MCNC: 32 MG/DL (ref 6–20)
BUN/CREAT SERPL: 27 (ref 12–20)
CALCIUM SERPL-MCNC: 9.6 MG/DL (ref 8.5–10.1)
CHLORIDE SERPL-SCNC: 102 MMOL/L (ref 97–108)
CO2 SERPL-SCNC: 30 MMOL/L (ref 21–32)
COLOR UR: ABNORMAL
CREAT SERPL-MCNC: 1.18 MG/DL (ref 0.7–1.3)
CRP SERPL-MCNC: <0.29 MG/DL (ref 0–0.6)
DIFFERENTIAL METHOD BLD: NORMAL
EKG ATRIAL RATE: 64 BPM
EKG DIAGNOSIS: NORMAL
EKG P AXIS: 57 DEGREES
EKG P-R INTERVAL: 156 MS
EKG Q-T INTERVAL: 396 MS
EKG QRS DURATION: 102 MS
EKG QTC CALCULATION (BAZETT): 408 MS
EKG R AXIS: 32 DEGREES
EKG T AXIS: 47 DEGREES
EKG VENTRICULAR RATE: 64 BPM
EOSINOPHIL # BLD: 0.1 K/UL (ref 0–0.4)
EOSINOPHIL NFR BLD: 2 % (ref 0–7)
EPITH CASTS URNS QL MICRO: ABNORMAL /LPF
ERYTHROCYTE [DISTWIDTH] IN BLOOD BY AUTOMATED COUNT: 11.8 % (ref 11.5–14.5)
ERYTHROCYTE [SEDIMENTATION RATE] IN BLOOD: 8 MM/HR (ref 0–20)
EST. AVERAGE GLUCOSE BLD GHB EST-MCNC: 105 MG/DL
GLOBULIN SER CALC-MCNC: 3.4 G/DL (ref 2–4)
GLUCOSE SERPL-MCNC: 82 MG/DL (ref 65–100)
GLUCOSE UR STRIP.AUTO-MCNC: NEGATIVE MG/DL
HBA1C MFR BLD: 5.3 % (ref 4–5.6)
HCT VFR BLD AUTO: 47.4 % (ref 36.6–50.3)
HGB BLD-MCNC: 16.4 G/DL (ref 12.1–17)
HGB UR QL STRIP: ABNORMAL
HYALINE CASTS URNS QL MICRO: ABNORMAL /LPF (ref 0–2)
IMM GRANULOCYTES # BLD AUTO: 0 K/UL (ref 0–0.04)
IMM GRANULOCYTES NFR BLD AUTO: 0 % (ref 0–0.5)
KETONES UR QL STRIP.AUTO: NEGATIVE MG/DL
LEUKOCYTE ESTERASE UR QL STRIP.AUTO: NEGATIVE
LYMPHOCYTES # BLD: 1.2 K/UL (ref 0.8–3.5)
LYMPHOCYTES NFR BLD: 20 % (ref 12–49)
MCH RBC QN AUTO: 31.7 PG (ref 26–34)
MCHC RBC AUTO-ENTMCNC: 34.6 G/DL (ref 30–36.5)
MCV RBC AUTO: 91.7 FL (ref 80–99)
MONOCYTES # BLD: 0.7 K/UL (ref 0–1)
MONOCYTES NFR BLD: 12 % (ref 5–13)
NEUTS SEG # BLD: 3.8 K/UL (ref 1.8–8)
NEUTS SEG NFR BLD: 66 % (ref 32–75)
NITRITE UR QL STRIP.AUTO: NEGATIVE
NRBC # BLD: 0 K/UL (ref 0–0.01)
NRBC BLD-RTO: 0 PER 100 WBC
PH UR STRIP: 5.5 (ref 5–8)
PLATELET # BLD AUTO: 256 K/UL (ref 150–400)
PMV BLD AUTO: 9.8 FL (ref 8.9–12.9)
POTASSIUM SERPL-SCNC: 3.9 MMOL/L (ref 3.5–5.1)
PROT SERPL-MCNC: 7.7 G/DL (ref 6.4–8.2)
PROT UR STRIP-MCNC: NEGATIVE MG/DL
RBC # BLD AUTO: 5.17 M/UL (ref 4.1–5.7)
RBC #/AREA URNS HPF: ABNORMAL /HPF (ref 0–5)
SODIUM SERPL-SCNC: 136 MMOL/L (ref 136–145)
SP GR UR REFRACTOMETRY: 1.02 (ref 1–1.03)
SPECIMEN EXP DATE BLD: NORMAL
URINE CULTURE IF INDICATED: ABNORMAL
UROBILINOGEN UR QL STRIP.AUTO: 1 EU/DL (ref 0.2–1)
WBC # BLD AUTO: 5.7 K/UL (ref 4.1–11.1)
WBC URNS QL MICRO: ABNORMAL /HPF (ref 0–4)

## 2023-12-06 PROCEDURE — 84466 ASSAY OF TRANSFERRIN: CPT

## 2023-12-06 PROCEDURE — 93010 ELECTROCARDIOGRAM REPORT: CPT | Performed by: STUDENT IN AN ORGANIZED HEALTH CARE EDUCATION/TRAINING PROGRAM

## 2023-12-06 PROCEDURE — 85025 COMPLETE CBC W/AUTO DIFF WBC: CPT

## 2023-12-06 PROCEDURE — 86850 RBC ANTIBODY SCREEN: CPT

## 2023-12-06 PROCEDURE — 83036 HEMOGLOBIN GLYCOSYLATED A1C: CPT

## 2023-12-06 PROCEDURE — APPNB30 APP NON BILLABLE TIME 0-30 MINS: Performed by: NURSE PRACTITIONER

## 2023-12-06 PROCEDURE — 85652 RBC SED RATE AUTOMATED: CPT

## 2023-12-06 PROCEDURE — 86140 C-REACTIVE PROTEIN: CPT

## 2023-12-06 PROCEDURE — 80053 COMPREHEN METABOLIC PANEL: CPT

## 2023-12-06 PROCEDURE — 81001 URINALYSIS AUTO W/SCOPE: CPT

## 2023-12-06 PROCEDURE — 93005 ELECTROCARDIOGRAM TRACING: CPT | Performed by: NURSE PRACTITIONER

## 2023-12-06 PROCEDURE — 36415 COLL VENOUS BLD VENIPUNCTURE: CPT

## 2023-12-06 RX ORDER — CETIRIZINE HYDROCHLORIDE 10 MG/1
10 TABLET ORAL DAILY
COMMUNITY

## 2023-12-06 RX ORDER — M-VIT,TX,IRON,MINS/CALC/FOLIC 27MG-0.4MG
1 TABLET ORAL DAILY
COMMUNITY

## 2023-12-06 RX ORDER — LISINOPRIL AND HYDROCHLOROTHIAZIDE 12.5; 1 MG/1; MG/1
1 TABLET ORAL DAILY
COMMUNITY

## 2023-12-06 ASSESSMENT — ENCOUNTER SYMPTOMS
WHEEZING: 0
BLOOD IN STOOL: 0
COUGH: 0
SORE THROAT: 0
SHORTNESS OF BREATH: 0
VOMITING: 0
NAUSEA: 0
TROUBLE SWALLOWING: 0
ABDOMINAL PAIN: 0

## 2023-12-06 ASSESSMENT — PAIN DESCRIPTION - PAIN TYPE: TYPE: CHRONIC PAIN

## 2023-12-06 ASSESSMENT — PAIN SCALES - GENERAL: PAINLEVEL_OUTOF10: 5

## 2023-12-06 ASSESSMENT — PAIN DESCRIPTION - ORIENTATION: ORIENTATION: RIGHT

## 2023-12-06 ASSESSMENT — PAIN DESCRIPTION - LOCATION: LOCATION: KNEE

## 2023-12-06 ASSESSMENT — PAIN DESCRIPTION - DESCRIPTORS: DESCRIPTORS: ACHING

## 2023-12-06 NOTE — H&P
(239 lb 6.7 oz), SpO2 97 %. Review of Systems  Review of Systems   Constitutional:  Negative for chills, fatigue and fever. HENT:  Negative for congestion, hearing loss, sore throat and trouble swallowing. Eyes:  Negative for visual disturbance. Respiratory:  Negative for cough, shortness of breath and wheezing. Cardiovascular:  Negative for chest pain, palpitations and leg swelling. Gastrointestinal:  Negative for abdominal pain, blood in stool, nausea and vomiting. Genitourinary:  Negative for dysuria, frequency and urgency. Musculoskeletal:  Positive for arthralgias (right knee pain). Skin:  Negative for rash and wound. Neurological:  Negative for dizziness, light-headedness, numbness and headaches. Psychiatric/Behavioral:  Negative for dysphoric mood. The patient is not nervous/anxious. Physical Exam  Vitals and nursing note reviewed. Exam conducted with a chaperone present. Constitutional:       Appearance: Normal appearance. HENT:      Head: Normocephalic and atraumatic. Right Ear: External ear normal.      Left Ear: External ear normal.      Nose: Nose normal.      Mouth/Throat:      Mouth: Mucous membranes are moist.      Pharynx: Oropharynx is clear. Eyes:      Extraocular Movements: Extraocular movements intact. Cardiovascular:      Rate and Rhythm: Normal rate and regular rhythm. Pulses: Normal pulses. Heart sounds: Normal heart sounds. Pulmonary:      Effort: Pulmonary effort is normal.      Breath sounds: Normal breath sounds. No wheezing or rhonchi. Abdominal:      General: Bowel sounds are normal.      Palpations: Abdomen is soft. Tenderness: There is no abdominal tenderness. Musculoskeletal:      Cervical back: Normal range of motion and neck supple. Right knee: Bony tenderness present. Decreased range of motion. Skin:     General: Skin is warm and dry. Findings: No lesion or rash.    Neurological:      General: No focal

## 2023-12-08 ENCOUNTER — ANESTHESIA EVENT (OUTPATIENT)
Facility: HOSPITAL | Age: 63
End: 2023-12-08
Payer: COMMERCIAL

## 2023-12-08 LAB
BACTERIA SPEC CULT: NORMAL
BACTERIA SPEC CULT: NORMAL
SERVICE CMNT-IMP: NORMAL

## 2023-12-09 LAB — TRANSFERRIN SERPL-MCNC: 268 MG/DL (ref 177–329)

## 2023-12-11 ENCOUNTER — HOSPITAL ENCOUNTER (OUTPATIENT)
Facility: HOSPITAL | Age: 63
Setting detail: OBSERVATION
Discharge: HOME | End: 2023-12-12
Attending: ORTHOPAEDIC SURGERY | Admitting: ORTHOPAEDIC SURGERY
Payer: COMMERCIAL

## 2023-12-11 ENCOUNTER — ANESTHESIA (OUTPATIENT)
Facility: HOSPITAL | Age: 63
End: 2023-12-11
Payer: COMMERCIAL

## 2023-12-11 ENCOUNTER — APPOINTMENT (OUTPATIENT)
Facility: HOSPITAL | Age: 63
End: 2023-12-11
Attending: ORTHOPAEDIC SURGERY
Payer: COMMERCIAL

## 2023-12-11 DIAGNOSIS — Z96.659 LOOSENING OF KNEE JOINT PROSTHESIS, SUBSEQUENT ENCOUNTER: Primary | ICD-10-CM

## 2023-12-11 DIAGNOSIS — T84.038D LOOSENING OF KNEE JOINT PROSTHESIS, SUBSEQUENT ENCOUNTER: Primary | ICD-10-CM

## 2023-12-11 PROBLEM — T84.032D MECHANICAL LOOSENING OF INTERNAL RIGHT KNEE PROSTHETIC JOINT, SUBSEQUENT ENCOUNTER: Status: ACTIVE | Noted: 2023-12-11

## 2023-12-11 LAB
APPEARANCE SNV: ABNORMAL
COLOR SNV: YELLOW
GLUCOSE BLD STRIP.AUTO-MCNC: 105 MG/DL (ref 65–117)
LYMPHOCYTES NFR SNV MANUAL: 14 % (ref 0–15)
MONOCYTES NFR SNV MANUAL: 86 % (ref 0–65)
RBC # SNV: >100 /CU MM
SERVICE CMNT-IMP: NORMAL
SPECIMEN SOURCE FLD: ABNORMAL
WBC # SNV: 444 /CU MM (ref 0–150)

## 2023-12-11 PROCEDURE — 6370000000 HC RX 637 (ALT 250 FOR IP): Performed by: ORTHOPAEDIC SURGERY

## 2023-12-11 PROCEDURE — G0378 HOSPITAL OBSERVATION PER HR: HCPCS

## 2023-12-11 PROCEDURE — 6360000002 HC RX W HCPCS: Performed by: PHYSICIAN ASSISTANT

## 2023-12-11 PROCEDURE — C1776 JOINT DEVICE (IMPLANTABLE): HCPCS | Performed by: ORTHOPAEDIC SURGERY

## 2023-12-11 PROCEDURE — 2709999900 HC NON-CHARGEABLE SUPPLY: Performed by: ORTHOPAEDIC SURGERY

## 2023-12-11 PROCEDURE — 6370000000 HC RX 637 (ALT 250 FOR IP): Performed by: PHYSICIAN ASSISTANT

## 2023-12-11 PROCEDURE — 3600000015 HC SURGERY LEVEL 5 ADDTL 15MIN: Performed by: ORTHOPAEDIC SURGERY

## 2023-12-11 PROCEDURE — 6360000002 HC RX W HCPCS: Performed by: ORTHOPAEDIC SURGERY

## 2023-12-11 PROCEDURE — 97116 GAIT TRAINING THERAPY: CPT

## 2023-12-11 PROCEDURE — 6370000000 HC RX 637 (ALT 250 FOR IP): Performed by: ANESTHESIOLOGY

## 2023-12-11 PROCEDURE — 87205 SMEAR GRAM STAIN: CPT

## 2023-12-11 PROCEDURE — 6360000002 HC RX W HCPCS: Performed by: NURSE ANESTHETIST, CERTIFIED REGISTERED

## 2023-12-11 PROCEDURE — 3700000001 HC ADD 15 MINUTES (ANESTHESIA): Performed by: ORTHOPAEDIC SURGERY

## 2023-12-11 PROCEDURE — 2580000003 HC RX 258: Performed by: PHYSICIAN ASSISTANT

## 2023-12-11 PROCEDURE — 3600000005 HC SURGERY LEVEL 5 BASE: Performed by: ORTHOPAEDIC SURGERY

## 2023-12-11 PROCEDURE — 2580000003 HC RX 258: Performed by: ORTHOPAEDIC SURGERY

## 2023-12-11 PROCEDURE — 7100000001 HC PACU RECOVERY - ADDTL 15 MIN: Performed by: ORTHOPAEDIC SURGERY

## 2023-12-11 PROCEDURE — 87070 CULTURE OTHR SPECIMN AEROBIC: CPT

## 2023-12-11 PROCEDURE — 2500000003 HC RX 250 WO HCPCS: Performed by: NURSE ANESTHETIST, CERTIFIED REGISTERED

## 2023-12-11 PROCEDURE — 2580000003 HC RX 258: Performed by: ANESTHESIOLOGY

## 2023-12-11 PROCEDURE — 73560 X-RAY EXAM OF KNEE 1 OR 2: CPT

## 2023-12-11 PROCEDURE — 97161 PT EVAL LOW COMPLEX 20 MIN: CPT

## 2023-12-11 PROCEDURE — 89050 BODY FLUID CELL COUNT: CPT

## 2023-12-11 PROCEDURE — 6360000002 HC RX W HCPCS: Performed by: ANESTHESIOLOGY

## 2023-12-11 PROCEDURE — APPNB30 APP NON BILLABLE TIME 0-30 MINS: Performed by: NURSE PRACTITIONER

## 2023-12-11 PROCEDURE — 3700000000 HC ANESTHESIA ATTENDED CARE: Performed by: ORTHOPAEDIC SURGERY

## 2023-12-11 PROCEDURE — 82962 GLUCOSE BLOOD TEST: CPT

## 2023-12-11 PROCEDURE — 7100000000 HC PACU RECOVERY - FIRST 15 MIN: Performed by: ORTHOPAEDIC SURGERY

## 2023-12-11 PROCEDURE — 87075 CULTR BACTERIA EXCEPT BLOOD: CPT

## 2023-12-11 PROCEDURE — 64999 UNLISTED PX NERVOUS SYSTEM: CPT | Performed by: ANESTHESIOLOGY

## 2023-12-11 DEVICE — TIBIAL BEARING INSERT - CS
Type: IMPLANTABLE DEVICE | Site: KNEE | Status: FUNCTIONAL
Brand: TRIATHLON

## 2023-12-11 RX ORDER — OXYCODONE HCL 10 MG/1
10 TABLET, FILM COATED, EXTENDED RELEASE ORAL ONCE
Status: COMPLETED | OUTPATIENT
Start: 2023-12-11 | End: 2023-12-11

## 2023-12-11 RX ORDER — KETOROLAC TROMETHAMINE 30 MG/ML
30 INJECTION, SOLUTION INTRAMUSCULAR; INTRAVENOUS EVERY 6 HOURS
Status: DISCONTINUED | OUTPATIENT
Start: 2023-12-11 | End: 2023-12-12 | Stop reason: HOSPADM

## 2023-12-11 RX ORDER — SODIUM CHLORIDE 9 MG/ML
INJECTION, SOLUTION INTRAVENOUS PRN
Status: DISCONTINUED | OUTPATIENT
Start: 2023-12-11 | End: 2023-12-12 | Stop reason: HOSPADM

## 2023-12-11 RX ORDER — CETIRIZINE HYDROCHLORIDE 10 MG/1
10 TABLET ORAL
Status: DISCONTINUED | OUTPATIENT
Start: 2023-12-11 | End: 2023-12-12 | Stop reason: HOSPADM

## 2023-12-11 RX ORDER — OXYCODONE HYDROCHLORIDE 5 MG/1
10 TABLET ORAL
Status: COMPLETED | OUTPATIENT
Start: 2023-12-11 | End: 2023-12-11

## 2023-12-11 RX ORDER — ONDANSETRON 4 MG/1
4 TABLET, ORALLY DISINTEGRATING ORAL EVERY 8 HOURS PRN
Qty: 10 TABLET | Refills: 0 | Status: SHIPPED | OUTPATIENT
Start: 2023-12-11

## 2023-12-11 RX ORDER — DEXMEDETOMIDINE HYDROCHLORIDE 100 UG/ML
INJECTION, SOLUTION INTRAVENOUS PRN
Status: DISCONTINUED | OUTPATIENT
Start: 2023-12-11 | End: 2023-12-11 | Stop reason: SDUPTHER

## 2023-12-11 RX ORDER — BUPIVACAINE HYDROCHLORIDE 5 MG/ML
INJECTION, SOLUTION EPIDURAL; INTRACAUDAL
Status: COMPLETED | OUTPATIENT
Start: 2023-12-11 | End: 2023-12-11

## 2023-12-11 RX ORDER — DIPHENHYDRAMINE HYDROCHLORIDE 50 MG/ML
25 INJECTION INTRAMUSCULAR; INTRAVENOUS EVERY 6 HOURS PRN
Status: DISCONTINUED | OUTPATIENT
Start: 2023-12-11 | End: 2023-12-12 | Stop reason: HOSPADM

## 2023-12-11 RX ORDER — DIPHENHYDRAMINE HCL 25 MG
25 CAPSULE ORAL EVERY 6 HOURS PRN
Status: DISCONTINUED | OUTPATIENT
Start: 2023-12-11 | End: 2023-12-12 | Stop reason: HOSPADM

## 2023-12-11 RX ORDER — ASPIRIN 81 MG/1
81 TABLET ORAL 2 TIMES DAILY
Status: DISCONTINUED | OUTPATIENT
Start: 2023-12-11 | End: 2023-12-12 | Stop reason: HOSPADM

## 2023-12-11 RX ORDER — ACETAMINOPHEN 325 MG/1
650 TABLET ORAL EVERY 6 HOURS
Status: DISCONTINUED | OUTPATIENT
Start: 2023-12-11 | End: 2023-12-12 | Stop reason: HOSPADM

## 2023-12-11 RX ORDER — ONDANSETRON 2 MG/ML
INJECTION INTRAMUSCULAR; INTRAVENOUS PRN
Status: DISCONTINUED | OUTPATIENT
Start: 2023-12-11 | End: 2023-12-11 | Stop reason: SDUPTHER

## 2023-12-11 RX ORDER — HYDROMORPHONE HYDROCHLORIDE 1 MG/ML
1 INJECTION, SOLUTION INTRAMUSCULAR; INTRAVENOUS; SUBCUTANEOUS
Status: DISCONTINUED | OUTPATIENT
Start: 2023-12-11 | End: 2023-12-12 | Stop reason: HOSPADM

## 2023-12-11 RX ORDER — SODIUM CHLORIDE, SODIUM LACTATE, POTASSIUM CHLORIDE, CALCIUM CHLORIDE 600; 310; 30; 20 MG/100ML; MG/100ML; MG/100ML; MG/100ML
INJECTION, SOLUTION INTRAVENOUS CONTINUOUS
Status: DISCONTINUED | OUTPATIENT
Start: 2023-12-11 | End: 2023-12-11

## 2023-12-11 RX ORDER — FAMOTIDINE 20 MG/1
20 TABLET, FILM COATED ORAL 2 TIMES DAILY
Status: DISCONTINUED | OUTPATIENT
Start: 2023-12-11 | End: 2023-12-12 | Stop reason: HOSPADM

## 2023-12-11 RX ORDER — OXYCODONE HYDROCHLORIDE 5 MG/1
5 TABLET ORAL EVERY 4 HOURS PRN
Qty: 42 TABLET | Refills: 0 | Status: SHIPPED | OUTPATIENT
Start: 2023-12-11 | End: 2023-12-18

## 2023-12-11 RX ORDER — ONDANSETRON 2 MG/ML
4 INJECTION INTRAMUSCULAR; INTRAVENOUS
Status: DISCONTINUED | OUTPATIENT
Start: 2023-12-11 | End: 2023-12-11 | Stop reason: HOSPADM

## 2023-12-11 RX ORDER — PREGABALIN 50 MG/1
50 CAPSULE ORAL
Qty: 60 CAPSULE | Refills: 0 | Status: SHIPPED | OUTPATIENT
Start: 2023-12-11 | End: 2024-02-09

## 2023-12-11 RX ORDER — MEPERIDINE HYDROCHLORIDE 25 MG/ML
12.5 INJECTION INTRAMUSCULAR; INTRAVENOUS; SUBCUTANEOUS EVERY 5 MIN PRN
Status: DISCONTINUED | OUTPATIENT
Start: 2023-12-11 | End: 2023-12-11 | Stop reason: HOSPADM

## 2023-12-11 RX ORDER — PREGABALIN 75 MG/1
75 CAPSULE ORAL
Status: DISCONTINUED | OUTPATIENT
Start: 2023-12-11 | End: 2023-12-12 | Stop reason: HOSPADM

## 2023-12-11 RX ORDER — ASPIRIN 81 MG/1
81 TABLET ORAL 2 TIMES DAILY
Qty: 60 TABLET | Refills: 3 | Status: SHIPPED | OUTPATIENT
Start: 2023-12-11

## 2023-12-11 RX ORDER — IBUPROFEN 800 MG/1
800 TABLET ORAL EVERY 6 HOURS
Qty: 60 TABLET | Refills: 0 | Status: SHIPPED | OUTPATIENT
Start: 2023-12-11

## 2023-12-11 RX ORDER — OXYCODONE HYDROCHLORIDE 5 MG/1
5 TABLET ORAL
Status: DISCONTINUED | OUTPATIENT
Start: 2023-12-11 | End: 2023-12-12 | Stop reason: HOSPADM

## 2023-12-11 RX ORDER — TRAZODONE HYDROCHLORIDE 100 MG/1
100 TABLET ORAL NIGHTLY
Status: DISCONTINUED | OUTPATIENT
Start: 2023-12-11 | End: 2023-12-12 | Stop reason: HOSPADM

## 2023-12-11 RX ORDER — ATORVASTATIN CALCIUM 10 MG/1
10 TABLET, FILM COATED ORAL
Status: DISCONTINUED | OUTPATIENT
Start: 2023-12-11 | End: 2023-12-12 | Stop reason: HOSPADM

## 2023-12-11 RX ORDER — PREGABALIN 75 MG/1
75 CAPSULE ORAL ONCE
Status: COMPLETED | OUTPATIENT
Start: 2023-12-11 | End: 2023-12-11

## 2023-12-11 RX ORDER — ACETAMINOPHEN 325 MG/1
650 TABLET ORAL EVERY 4 HOURS
Qty: 120 TABLET | Refills: 1 | Status: SHIPPED | OUTPATIENT
Start: 2023-12-11 | End: 2024-01-10

## 2023-12-11 RX ORDER — FENTANYL CITRATE 50 UG/ML
INJECTION, SOLUTION INTRAMUSCULAR; INTRAVENOUS PRN
Status: DISCONTINUED | OUTPATIENT
Start: 2023-12-11 | End: 2023-12-11 | Stop reason: SDUPTHER

## 2023-12-11 RX ORDER — MIDAZOLAM HYDROCHLORIDE 1 MG/ML
INJECTION INTRAMUSCULAR; INTRAVENOUS PRN
Status: DISCONTINUED | OUTPATIENT
Start: 2023-12-11 | End: 2023-12-11 | Stop reason: SDUPTHER

## 2023-12-11 RX ORDER — ACETAMINOPHEN 325 MG/1
650 TABLET ORAL ONCE
Status: COMPLETED | OUTPATIENT
Start: 2023-12-11 | End: 2023-12-11

## 2023-12-11 RX ORDER — SODIUM CHLORIDE 0.9 % (FLUSH) 0.9 %
5-40 SYRINGE (ML) INJECTION PRN
Status: DISCONTINUED | OUTPATIENT
Start: 2023-12-11 | End: 2023-12-12 | Stop reason: HOSPADM

## 2023-12-11 RX ORDER — SODIUM CHLORIDE 0.9 % (FLUSH) 0.9 %
5-40 SYRINGE (ML) INJECTION EVERY 12 HOURS SCHEDULED
Status: DISCONTINUED | OUTPATIENT
Start: 2023-12-11 | End: 2023-12-12 | Stop reason: HOSPADM

## 2023-12-11 RX ORDER — LIDOCAINE HYDROCHLORIDE 10 MG/ML
1 INJECTION, SOLUTION EPIDURAL; INFILTRATION; INTRACAUDAL; PERINEURAL
Status: DISCONTINUED | OUTPATIENT
Start: 2023-12-11 | End: 2023-12-11 | Stop reason: HOSPADM

## 2023-12-11 RX ORDER — TRANEXAMIC ACID 100 MG/ML
INJECTION, SOLUTION INTRAVENOUS PRN
Status: DISCONTINUED | OUTPATIENT
Start: 2023-12-11 | End: 2023-12-11 | Stop reason: SDUPTHER

## 2023-12-11 RX ORDER — LISINOPRIL 5 MG/1
10 TABLET ORAL DAILY
Status: DISCONTINUED | OUTPATIENT
Start: 2023-12-12 | End: 2023-12-12 | Stop reason: HOSPADM

## 2023-12-11 RX ORDER — LABETALOL HYDROCHLORIDE 5 MG/ML
10 INJECTION, SOLUTION INTRAVENOUS
Status: DISCONTINUED | OUTPATIENT
Start: 2023-12-11 | End: 2023-12-11 | Stop reason: HOSPADM

## 2023-12-11 RX ORDER — OXYCODONE HYDROCHLORIDE 5 MG/1
10 TABLET ORAL
Status: DISCONTINUED | OUTPATIENT
Start: 2023-12-11 | End: 2023-12-12 | Stop reason: HOSPADM

## 2023-12-11 RX ORDER — LISINOPRIL AND HYDROCHLOROTHIAZIDE 12.5; 1 MG/1; MG/1
1 TABLET ORAL DAILY
Status: DISCONTINUED | OUTPATIENT
Start: 2023-12-11 | End: 2023-12-11

## 2023-12-11 RX ORDER — DIPHENHYDRAMINE HYDROCHLORIDE 50 MG/ML
12.5 INJECTION INTRAMUSCULAR; INTRAVENOUS
Status: DISCONTINUED | OUTPATIENT
Start: 2023-12-11 | End: 2023-12-11 | Stop reason: HOSPADM

## 2023-12-11 RX ORDER — POLYETHYLENE GLYCOL 3350 17 G/17G
17 POWDER, FOR SOLUTION ORAL DAILY
Status: DISCONTINUED | OUTPATIENT
Start: 2023-12-11 | End: 2023-12-12 | Stop reason: HOSPADM

## 2023-12-11 RX ORDER — BISACODYL 10 MG
10 SUPPOSITORY, RECTAL RECTAL DAILY PRN
Status: DISCONTINUED | OUTPATIENT
Start: 2023-12-11 | End: 2023-12-12 | Stop reason: HOSPADM

## 2023-12-11 RX ORDER — ROPIVACAINE HYDROCHLORIDE 2 MG/ML
INJECTION, SOLUTION EPIDURAL; INFILTRATION; PERINEURAL
Status: COMPLETED | OUTPATIENT
Start: 2023-12-11 | End: 2023-12-11

## 2023-12-11 RX ORDER — SODIUM CHLORIDE 9 MG/ML
INJECTION, SOLUTION INTRAVENOUS CONTINUOUS
Status: DISCONTINUED | OUTPATIENT
Start: 2023-12-11 | End: 2023-12-11

## 2023-12-11 RX ORDER — PROPOFOL 10 MG/ML
INJECTION, EMULSION INTRAVENOUS CONTINUOUS PRN
Status: DISCONTINUED | OUTPATIENT
Start: 2023-12-11 | End: 2023-12-11 | Stop reason: SDUPTHER

## 2023-12-11 RX ORDER — DROPERIDOL 2.5 MG/ML
0.62 INJECTION, SOLUTION INTRAMUSCULAR; INTRAVENOUS
Status: DISCONTINUED | OUTPATIENT
Start: 2023-12-11 | End: 2023-12-11 | Stop reason: HOSPADM

## 2023-12-11 RX ORDER — 0.9 % SODIUM CHLORIDE 0.9 %
500 INTRAVENOUS SOLUTION INTRAVENOUS ONCE
Status: DISCONTINUED | OUTPATIENT
Start: 2023-12-11 | End: 2023-12-12 | Stop reason: HOSPADM

## 2023-12-11 RX ORDER — BISACODYL 5 MG/1
5 TABLET, DELAYED RELEASE ORAL DAILY PRN
Status: DISCONTINUED | OUTPATIENT
Start: 2023-12-11 | End: 2023-12-12 | Stop reason: HOSPADM

## 2023-12-11 RX ORDER — ONDANSETRON 4 MG/1
4 TABLET, ORALLY DISINTEGRATING ORAL EVERY 8 HOURS PRN
Status: DISCONTINUED | OUTPATIENT
Start: 2023-12-11 | End: 2023-12-12 | Stop reason: HOSPADM

## 2023-12-11 RX ORDER — FAMOTIDINE 10 MG/ML
INJECTION, SOLUTION INTRAVENOUS PRN
Status: DISCONTINUED | OUTPATIENT
Start: 2023-12-11 | End: 2023-12-11 | Stop reason: SDUPTHER

## 2023-12-11 RX ORDER — HYDROCHLOROTHIAZIDE 25 MG/1
12.5 TABLET ORAL DAILY
Status: DISCONTINUED | OUTPATIENT
Start: 2023-12-12 | End: 2023-12-12 | Stop reason: HOSPADM

## 2023-12-11 RX ORDER — SODIUM CHLORIDE, SODIUM LACTATE, POTASSIUM CHLORIDE, CALCIUM CHLORIDE 600; 310; 30; 20 MG/100ML; MG/100ML; MG/100ML; MG/100ML
INJECTION, SOLUTION INTRAVENOUS CONTINUOUS
Status: DISCONTINUED | OUTPATIENT
Start: 2023-12-11 | End: 2023-12-11 | Stop reason: HOSPADM

## 2023-12-11 RX ORDER — ONDANSETRON 2 MG/ML
4 INJECTION INTRAMUSCULAR; INTRAVENOUS EVERY 6 HOURS PRN
Status: DISCONTINUED | OUTPATIENT
Start: 2023-12-11 | End: 2023-12-12 | Stop reason: HOSPADM

## 2023-12-11 RX ORDER — TRAMADOL HYDROCHLORIDE 50 MG/1
50 TABLET ORAL EVERY 6 HOURS PRN
Qty: 30 TABLET | Refills: 0 | Status: SHIPPED | OUTPATIENT
Start: 2023-12-11 | End: 2023-12-18

## 2023-12-11 RX ADMIN — OXYCODONE HYDROCHLORIDE 5 MG: 5 TABLET ORAL at 20:51

## 2023-12-11 RX ADMIN — SODIUM CHLORIDE, POTASSIUM CHLORIDE, SODIUM LACTATE AND CALCIUM CHLORIDE: 600; 310; 30; 20 INJECTION, SOLUTION INTRAVENOUS at 12:10

## 2023-12-11 RX ADMIN — KETOROLAC TROMETHAMINE 30 MG: 30 INJECTION, SOLUTION INTRAMUSCULAR; INTRAVENOUS at 13:27

## 2023-12-11 RX ADMIN — BUPIVACAINE HYDROCHLORIDE 11 MG: 5 INJECTION, SOLUTION EPIDURAL; INTRACAUDAL; PERINEURAL at 11:21

## 2023-12-11 RX ADMIN — ROPIVACAINE HYDROCHLORIDE 20 ML: 2 INJECTION, SOLUTION EPIDURAL; INFILTRATION at 11:14

## 2023-12-11 RX ADMIN — SODIUM CHLORIDE, PRESERVATIVE FREE 10 ML: 5 INJECTION INTRAVENOUS at 20:51

## 2023-12-11 RX ADMIN — WATER 2000 MG: 1 INJECTION INTRAMUSCULAR; INTRAVENOUS; SUBCUTANEOUS at 11:38

## 2023-12-11 RX ADMIN — DEXMEDETOMIDINE 10 MCG: 100 INJECTION, SOLUTION INTRAVENOUS at 11:22

## 2023-12-11 RX ADMIN — FENTANYL CITRATE 100 MCG: 50 INJECTION, SOLUTION INTRAMUSCULAR; INTRAVENOUS at 11:20

## 2023-12-11 RX ADMIN — OXYCODONE 10 MG: 5 TABLET ORAL at 16:36

## 2023-12-11 RX ADMIN — ACETAMINOPHEN 650 MG: 325 TABLET ORAL at 10:25

## 2023-12-11 RX ADMIN — ASPIRIN 81 MG: 81 TABLET, COATED ORAL at 20:48

## 2023-12-11 RX ADMIN — TRAZODONE HYDROCHLORIDE 100 MG: 100 TABLET ORAL at 20:48

## 2023-12-11 RX ADMIN — OXYCODONE HYDROCHLORIDE 10 MG: 10 TABLET, FILM COATED, EXTENDED RELEASE ORAL at 10:25

## 2023-12-11 RX ADMIN — ACETAMINOPHEN 650 MG: 325 TABLET ORAL at 16:36

## 2023-12-11 RX ADMIN — PREGABALIN 75 MG: 75 CAPSULE ORAL at 10:25

## 2023-12-11 RX ADMIN — FAMOTIDINE 20 MG: 10 INJECTION, SOLUTION INTRAVENOUS at 11:14

## 2023-12-11 RX ADMIN — MIDAZOLAM HYDROCHLORIDE 2 MG: 1 INJECTION, SOLUTION INTRAMUSCULAR; INTRAVENOUS at 11:20

## 2023-12-11 RX ADMIN — TRANEXAMIC ACID 1000 MG: 100 INJECTION, SOLUTION INTRAVENOUS at 12:36

## 2023-12-11 RX ADMIN — TRANEXAMIC ACID 1000 MG: 100 INJECTION, SOLUTION INTRAVENOUS at 11:38

## 2023-12-11 RX ADMIN — PREGABALIN 75 MG: 75 CAPSULE ORAL at 20:49

## 2023-12-11 RX ADMIN — KETOROLAC TROMETHAMINE 30 MG: 30 INJECTION, SOLUTION INTRAMUSCULAR; INTRAVENOUS at 20:48

## 2023-12-11 RX ADMIN — FAMOTIDINE 20 MG: 20 TABLET, FILM COATED ORAL at 20:49

## 2023-12-11 RX ADMIN — PROPOFOL 100 MCG/KG/MIN: 10 INJECTION, EMULSION INTRAVENOUS at 11:28

## 2023-12-11 RX ADMIN — ATORVASTATIN CALCIUM 10 MG: 10 TABLET, FILM COATED ORAL at 20:49

## 2023-12-11 RX ADMIN — WATER 2000 MG: 1 INJECTION INTRAMUSCULAR; INTRAVENOUS; SUBCUTANEOUS at 20:46

## 2023-12-11 RX ADMIN — SODIUM CHLORIDE, POTASSIUM CHLORIDE, SODIUM LACTATE AND CALCIUM CHLORIDE: 600; 310; 30; 20 INJECTION, SOLUTION INTRAVENOUS at 10:24

## 2023-12-11 RX ADMIN — ONDANSETRON 4 MG: 2 INJECTION INTRAMUSCULAR; INTRAVENOUS at 11:16

## 2023-12-11 RX ADMIN — PROPOFOL 50 MG: 10 INJECTION, EMULSION INTRAVENOUS at 11:29

## 2023-12-11 ASSESSMENT — PAIN DESCRIPTION - LOCATION: LOCATION: LEG

## 2023-12-11 ASSESSMENT — PAIN SCALES - GENERAL
PAINLEVEL_OUTOF10: 4
PAINLEVEL_OUTOF10: 0

## 2023-12-11 ASSESSMENT — PAIN - FUNCTIONAL ASSESSMENT: PAIN_FUNCTIONAL_ASSESSMENT: 0-10

## 2023-12-11 ASSESSMENT — PAIN DESCRIPTION - DESCRIPTORS: DESCRIPTORS: OTHER (COMMENT)

## 2023-12-11 ASSESSMENT — PAIN DESCRIPTION - ORIENTATION: ORIENTATION: RIGHT

## 2023-12-11 NOTE — ANESTHESIA PRE PROCEDURE
Department of Anesthesiology  Preprocedure Note       Name:  Ramonita Tavarez   Age:  59 y.o.  :  1960                                          MRN:  [id number]         Date:  2023      Surgeon: Marianela Capps):  Shahnaz Koroma MD    Procedure: Procedure(s):  RIGHT KNEE POSSIBLE TIBIAL REVISION AND PATELLAR REALIGNMENT WITH LATERAL RELEASE AND MEDIAL REEFING    Medications prior to admission:   Prior to Admission medications    Medication Sig Start Date End Date Taking? Authorizing Provider   pregabalin (LYRICA) 50 MG capsule Take 1 capsule by mouth nightly for 60 days. Max Daily Amount: 50 mg 23 Yes Shahnaz Koroma MD   traMADol (ULTRAM) 50 MG tablet Take 1 tablet by mouth every 6 hours as needed for Pain for up to 7 days. Max Daily Amount: 200 mg 23 Yes Shahnaz Koroma MD   ibuprofen (ADVIL;MOTRIN) 800 MG tablet Take 1 tablet by mouth every 6 hours 23  Yes Shahnaz Koroma MD   acetaminophen (TYLENOL) 325 MG tablet Take 2 tablets by mouth every 4 hours 12/11/23 1/10/24 Yes Shahnaz Koroma MD   oxyCODONE (ROXICODONE) 5 MG immediate release tablet Take 1 tablet by mouth every 4 hours as needed for Pain for up to 7 days.  Max Daily Amount: 30 mg 23 Yes Shahnaz Koroma MD   aspirin (ASPIRIN 81) 81 MG EC tablet Take 1 tablet by mouth 2 times daily 23  Yes Shahnaz Koroma MD   ondansetron (ZOFRAN-ODT) 4 MG disintegrating tablet Take 1 tablet by mouth every 8 hours as needed for Nausea or Vomiting 23  Yes Shahnaz Koroma MD   cetirizine (ZYRTEC) 10 MG tablet Take 1 tablet by mouth daily    Panchito Hand MD   lisinopril-hydroCHLOROthiazide (PRINZIDE;ZESTORETIC) 10-12.5 MG per tablet Take 1 tablet by mouth daily    Panchito Hand MD   Multiple Vitamins-Minerals (THERAPEUTIC MULTIVITAMIN-MINERALS) tablet Take 1 tablet by mouth daily    Panchito Hand MD   simvastatin (ZOCOR) 20 MG tablet Take 1 tablet by mouth nightly    Automatic

## 2023-12-11 NOTE — ANESTHESIA POSTPROCEDURE EVALUATION
Department of Anesthesiology  Postprocedure Note    Patient: Kelby Calderon  MRN: [id number]  YOB: 1960  Date of evaluation: 12/11/2023      Procedure Summary     Date: 12/11/23 Room / Location: [facility] ASU OR [address] AMBULATORY OR    Anesthesia Start: 1122 Anesthesia Stop: 1317    Procedure: RIGHT KNEE POLY EXCHANGE WITH LATERAL RELEASE AND MEDIAL REEFING (Right: Knee) Diagnosis:       Pain due to hip joint prosthesis, subsequent encounter      Status post right unicompartmental knee replacement      (Pain due to hip joint prosthesis, subsequent encounter [I11.76WF, Z96.649])      (Status post right unicompartmental knee replacement [Z96.651])    Surgeons: Sanaz Almeida MD Responsible Provider: Hoang Prajapati MD    Anesthesia Type: Regional, Spinal, MAC ASA Status: 2          Anesthesia Type: Regional, Spinal, MAC    Dion Phase I: Dion Score: 10    Dion Phase II:        Anesthesia Post Evaluation    Patient location during evaluation: PACU  Patient participation: complete - patient participated  Level of consciousness: awake  Airway patency: patent  Nausea & Vomiting: no vomiting and no nausea  Complications: no  Cardiovascular status: hemodynamically stable  Respiratory status: acceptable  Hydration status: stable  Multimodal analgesia pain management approach  Pain management: adequate

## 2023-12-11 NOTE — ANESTHESIA PROCEDURE NOTES
Peripheral Block    Patient location during procedure: pre-op  Reason for block: post-op pain management and at surgeon's request  Start time: 12/11/2023 11:14 AM  End time: 12/11/2023 11:21 AM  Staffing  Performed: anesthesiologist   Performed by: Felipa Elizalde MD  Authorized by: Felipa Elizalde MD    Preanesthetic Checklist  Completed: patient identified, IV checked, site marked, risks and benefits discussed, surgical/procedural consents, equipment checked, pre-op evaluation, timeout performed, anesthesia consent given, oxygen available, monitors applied/VS acknowledged, fire risk safety assessment completed and verbalized and blood product R/B/A discussed and consented  Peripheral Block   Patient position: supine  Prep: ChloraPrep  Provider prep: mask and sterile gloves  Patient monitoring: cardiac monitor, continuous pulse ox, continuous capnometry, frequent blood pressure checks, IV access, oxygen and responsive to questions  Block type: iPacks  Laterality: right  Injection technique: single-shot  Guidance: ultrasound guided  Local infiltration: ropivacaine  Local infiltration: ropivacaine    Needle   Needle type: insulated echogenic nerve stimulator needle   Needle gauge: 20 G  Needle localization: ultrasound guidance  Assessment   Injection assessment: negative aspiration for heme, no paresthesia on injection, local visualized surrounding nerve on ultrasound and no intravascular symptoms  Paresthesia pain: none  Slow fractionated injection: yes  Hemodynamics: stable  Real-time US image taken/store: yes  Outcomes: uncomplicated    Medications Administered  ropivacaine (NAROPIN) injection 0.2% - Perineural   20 mL - 12/11/2023 11:14:00 AM

## 2023-12-11 NOTE — OP NOTE
OPERATIVE REPORT     Admit Date: 12/11/2023  Admit Diagnosis: Pain due to hip joint prosthesis, subsequent encounter [T84.84XD, Z96.649]  Status post right unicompartmental knee replacement [Z96.651]    Date of Procedure: [unfilled]   Preoperative Diagnosis: Pain due to hip joint prosthesis, subsequent encounter [Y73.02AH, Z96.649]  Status post right unicompartmental knee replacement [Z96.651]  Postoperative Diagnosis: * No post-op diagnosis entered *    Procedure: Procedure(s):  RIGHT KNEE POLY EXCHANGE WITH LATERAL RELEASE AND MEDIAL REEFING  Surgeon: John Ricardo MD  Assistant(s): Tong Rodriguez PA-C  Anesthesia: Spinal   Estimated Blood Loss: 300cc  Specimens: [unfilled]   Complications: None          INDICATIONS:     The patient is a 59 y.o., male who has continued severe knee pain and possible loosening of the tibia. The patient underwent the appropriate preoperative labs and was indicated for surgery. Informed consent obtained including a discussion of the risks and benefits, which include, but are not limited to, bleeding, infection, neurovascular damage, wound complications, pain and stiffness in the knee, periprosthetic loosening, fracture dislocation and DVT, the patient consented for the procedure. DESCRIPTION OF PROCEDURE:     The proper limb was identified and signed in the preoperative holding area. All questions were answered. After adequate anesthesia, the Right lower extremity was prepped and draped in sterile fashion. The patient was positioned supine on the operating room table. Using the old incision a mid-line parapatellar incision was used and an aspiration of the knee was performed. A medial arthrotomy was used. Excess or residual scar was excised if non-viable. The medial and lateral gutters were debrided of scar and tissue. Soft tissue was removed from around the patellar component and notch. The MCL was mobilized and the tibia subluxed.  The tibial poly-ethylene was removed

## 2023-12-11 NOTE — ANESTHESIA PROCEDURE NOTES
Spinal Block    Patient location during procedure: pre-op  End time: 12/11/2023 11:26 AM  Reason for block: primary anesthetic  Staffing  Performed: resident/CRNA   Performed by: Ermias Bailey MD  Authorized by: Ermias Bailey MD    Spinal Block  Patient position: sitting  Prep: DuraPrep  Patient monitoring: cardiac monitor, continuous pulse ox, continuous capnometry, frequent blood pressure checks and oxygen  Approach: midline  Location: L2/L3  Provider prep: mask and sterile gloves  Needle  Needle type: Pencan   Needle gauge: 25 G  Assessment  CSF: clear  Attempts: 1  Hemodynamics: stable  Preanesthetic Checklist  Completed: patient identified, IV checked, site marked, risks and benefits discussed, surgical/procedural consents, equipment checked, pre-op evaluation, timeout performed, anesthesia consent given, oxygen available, monitors applied/VS acknowledged, fire risk safety assessment completed and verbalized and blood product R/B/A discussed and consented

## 2023-12-12 VITALS
HEART RATE: 73 BPM | DIASTOLIC BLOOD PRESSURE: 73 MMHG | WEIGHT: 237.66 LBS | RESPIRATION RATE: 16 BRPM | TEMPERATURE: 98.2 F | BODY MASS INDEX: 32.19 KG/M2 | HEIGHT: 72 IN | OXYGEN SATURATION: 96 % | SYSTOLIC BLOOD PRESSURE: 120 MMHG

## 2023-12-12 LAB
ANION GAP SERPL CALC-SCNC: 2 MMOL/L (ref 5–15)
BACTERIA SPEC CULT: NORMAL
BUN SERPL-MCNC: 39 MG/DL (ref 6–20)
BUN/CREAT SERPL: 29 (ref 12–20)
CALCIUM SERPL-MCNC: 8.4 MG/DL (ref 8.5–10.1)
CHLORIDE SERPL-SCNC: 107 MMOL/L (ref 97–108)
CO2 SERPL-SCNC: 29 MMOL/L (ref 21–32)
CREAT SERPL-MCNC: 1.36 MG/DL (ref 0.7–1.3)
GLUCOSE SERPL-MCNC: 93 MG/DL (ref 65–100)
GRAM STN SPEC: NORMAL
GRAM STN SPEC: NORMAL
HCT VFR BLD AUTO: 40.6 % (ref 36.6–50.3)
HGB BLD-MCNC: 13.9 G/DL (ref 12.1–17)
POTASSIUM SERPL-SCNC: 4.1 MMOL/L (ref 3.5–5.1)
SERVICE CMNT-IMP: NORMAL
SODIUM SERPL-SCNC: 138 MMOL/L (ref 136–145)

## 2023-12-12 PROCEDURE — 6370000000 HC RX 637 (ALT 250 FOR IP): Performed by: ORTHOPAEDIC SURGERY

## 2023-12-12 PROCEDURE — G0378 HOSPITAL OBSERVATION PER HR: HCPCS

## 2023-12-12 PROCEDURE — 80048 BASIC METABOLIC PNL TOTAL CA: CPT

## 2023-12-12 PROCEDURE — 97165 OT EVAL LOW COMPLEX 30 MIN: CPT

## 2023-12-12 PROCEDURE — 6360000002 HC RX W HCPCS: Performed by: PHYSICIAN ASSISTANT

## 2023-12-12 PROCEDURE — 97535 SELF CARE MNGMENT TRAINING: CPT

## 2023-12-12 PROCEDURE — 85018 HEMOGLOBIN: CPT

## 2023-12-12 PROCEDURE — 97530 THERAPEUTIC ACTIVITIES: CPT

## 2023-12-12 PROCEDURE — 85014 HEMATOCRIT: CPT

## 2023-12-12 PROCEDURE — 94761 N-INVAS EAR/PLS OXIMETRY MLT: CPT

## 2023-12-12 PROCEDURE — 96376 TX/PRO/DX INJ SAME DRUG ADON: CPT

## 2023-12-12 PROCEDURE — 6370000000 HC RX 637 (ALT 250 FOR IP): Performed by: PHYSICIAN ASSISTANT

## 2023-12-12 PROCEDURE — 36415 COLL VENOUS BLD VENIPUNCTURE: CPT

## 2023-12-12 PROCEDURE — 96374 THER/PROPH/DIAG INJ IV PUSH: CPT

## 2023-12-12 PROCEDURE — APPNB60 APP NON BILLABLE TIME 46-60 MINS: Performed by: NURSE PRACTITIONER

## 2023-12-12 PROCEDURE — 2580000003 HC RX 258: Performed by: PHYSICIAN ASSISTANT

## 2023-12-12 PROCEDURE — 97116 GAIT TRAINING THERAPY: CPT

## 2023-12-12 RX ORDER — CALCIUM CARBONATE 500 MG/1
500 TABLET, CHEWABLE ORAL EVERY 6 HOURS
Status: CANCELLED | OUTPATIENT
Start: 2023-12-12

## 2023-12-12 RX ADMIN — KETOROLAC TROMETHAMINE 30 MG: 30 INJECTION, SOLUTION INTRAMUSCULAR; INTRAVENOUS at 04:15

## 2023-12-12 RX ADMIN — ACETAMINOPHEN 650 MG: 325 TABLET ORAL at 04:17

## 2023-12-12 RX ADMIN — POLYETHYLENE GLYCOL 3350 17 G: 17 POWDER, FOR SOLUTION ORAL at 08:10

## 2023-12-12 RX ADMIN — LISINOPRIL 10 MG: 5 TABLET ORAL at 08:10

## 2023-12-12 RX ADMIN — ACETAMINOPHEN 650 MG: 325 TABLET ORAL at 00:35

## 2023-12-12 RX ADMIN — FAMOTIDINE 20 MG: 20 TABLET, FILM COATED ORAL at 08:10

## 2023-12-12 RX ADMIN — ASPIRIN 81 MG: 81 TABLET, COATED ORAL at 08:09

## 2023-12-12 RX ADMIN — OXYCODONE HYDROCHLORIDE 5 MG: 5 TABLET ORAL at 00:35

## 2023-12-12 RX ADMIN — HYDROCHLOROTHIAZIDE 12.5 MG: 25 TABLET ORAL at 08:09

## 2023-12-12 RX ADMIN — WATER 2000 MG: 1 INJECTION INTRAMUSCULAR; INTRAVENOUS; SUBCUTANEOUS at 04:15

## 2023-12-12 RX ADMIN — ACETAMINOPHEN 650 MG: 325 TABLET ORAL at 11:47

## 2023-12-12 RX ADMIN — SODIUM CHLORIDE, PRESERVATIVE FREE 10 ML: 5 INJECTION INTRAVENOUS at 08:11

## 2023-12-12 RX ADMIN — OXYCODONE HYDROCHLORIDE 5 MG: 5 TABLET ORAL at 08:10

## 2023-12-12 RX ADMIN — KETOROLAC TROMETHAMINE 30 MG: 30 INJECTION, SOLUTION INTRAMUSCULAR; INTRAVENOUS at 08:10

## 2023-12-12 RX ADMIN — OXYCODONE HYDROCHLORIDE 10 MG: 5 TABLET ORAL at 11:46

## 2023-12-12 RX ADMIN — OXYCODONE HYDROCHLORIDE 10 MG: 5 TABLET ORAL at 04:16

## 2023-12-12 ASSESSMENT — PAIN DESCRIPTION - ORIENTATION
ORIENTATION: RIGHT

## 2023-12-12 ASSESSMENT — PAIN DESCRIPTION - LOCATION
LOCATION: KNEE
LOCATION: LEG

## 2023-12-12 ASSESSMENT — PAIN SCALES - GENERAL
PAINLEVEL_OUTOF10: 10
PAINLEVEL_OUTOF10: 4
PAINLEVEL_OUTOF10: 4
PAINLEVEL_OUTOF10: 10
PAINLEVEL_OUTOF10: 5
PAINLEVEL_OUTOF10: 5

## 2023-12-12 ASSESSMENT — PAIN DESCRIPTION - DESCRIPTORS
DESCRIPTORS: ACHING;THROBBING
DESCRIPTORS: ACHING

## 2023-12-12 NOTE — PRE-PROCEDURE INSTRUCTIONS
The patient was provided a virtual link to view the pre-operative Joint Replacement Class Video  A Patient Education Book specific to total hip or knee joint replacement surgery was given to the patient in [facility]. The content of the class was presented using an audio power point presentation specific for patients undergoing total hip and knee replacement surgery. Incentive spirometer and CHG bath kits were verbally reviewed. Day of surgery routine and expectations, hospital routine and expectations, nutrition, alcohol, nicotine, medications, infection control, pain management, DVT precautions and equipment, ice therapy, durable medical equipment, exercises, mobility expectations and precautions, home preparation and safety were reviewed in class video. My contact information was shared with the patient to provide further information as requested by the patient related to their upcoming surgery. Received verbal confirmation that the patient and  viewed joint class online.

## 2023-12-12 NOTE — CARE COORDINATION
12/12/2023  9:22 AM  Care Management Progress Note      ICD-10-CM    1. Loosening of knee joint prosthesis, subsequent encounter  T84.038D pregabalin (LYRICA) 50 MG capsule    Z96.659 traMADol (ULTRAM) 50 MG tablet     oxyCODONE (ROXICODONE) 5 MG immediate release tablet          RUR:  NA - OBS  Risk Level: [x]Low []Moderate []High    Transition of care plan:  Discharge pending therapy clearance. Home with OP PT - [facility]. Pt has a RW for home use. Outpatient follow-up. Pt's family to transport. 12/12/23 7001   Service Assessment   Patient Orientation Alert and Oriented   Cognition Alert   History Provided By Patient   Primary Caregiver Self   Support Systems Spouse/Significant Other   Patient's Healthcare Decision Maker is: Legal Next of Kin   PCP Verified by CM Yes   Last Visit to PCP Within last 6 months   Prior Functional Level Independent in ADLs/IADLs   Current Functional Level Independent in ADLs/IADLs   Can patient return to prior living arrangement Yes   Ability to make needs known: Good   Family able to assist with home care needs: Yes   Would you like for me to discuss the discharge plan with any other family members/significant others, and if so, who? Yes   Financial Resources Other (Comment)   Social/Functional History   Lives With [address] [place] Help From Family   ADL Assistance Independent   Homemaking Assistance Independent   Ambulation Assistance Independent   Transfer Assistance Independent   Active  Yes   Discharge Planning   Living Arrangements Spouse/Significant Other   Current Services Prior To Admission None   DME Ordered?  No   Potential Assistance Purchasing Medications No   Type of Home Care Services None   Patient expects to be discharged to: [facility]   Services At/After Discharge Outpatient;PT   Mode of Transport at Discharge Other (see comment)   Confirm Follow Up Transport

## 2023-12-14 LAB
BACTERIA SPEC CULT: NORMAL
BACTERIA SPEC CULT: NORMAL
GRAM STN SPEC: NORMAL
GRAM STN SPEC: NORMAL
SERVICE CMNT-IMP: NORMAL

## 2023-12-25 LAB
BACTERIA SPEC CULT: NORMAL
GRAM STN SPEC: NORMAL
SERVICE CMNT-IMP: NORMAL

## 2024-01-03 ENCOUNTER — APPOINTMENT (OUTPATIENT)
Facility: HOSPITAL | Age: 64
End: 2024-01-03
Payer: COMMERCIAL

## 2024-01-03 ENCOUNTER — HOSPITAL ENCOUNTER (EMERGENCY)
Facility: HOSPITAL | Age: 64
Discharge: HOME | End: 2024-01-03
Attending: EMERGENCY MEDICINE
Payer: COMMERCIAL

## 2024-01-03 VITALS
HEIGHT: 72 IN | WEIGHT: 238 LBS | OXYGEN SATURATION: 94 % | DIASTOLIC BLOOD PRESSURE: 71 MMHG | BODY MASS INDEX: 32.23 KG/M2 | SYSTOLIC BLOOD PRESSURE: 108 MMHG | HEART RATE: 84 BPM | TEMPERATURE: 98.9 F | RESPIRATION RATE: 20 BRPM

## 2024-01-03 DIAGNOSIS — R07.9 CHEST PAIN, UNSPECIFIED TYPE: Primary | ICD-10-CM

## 2024-01-03 DIAGNOSIS — J98.11 ATELECTASIS: ICD-10-CM

## 2024-01-03 LAB
ALBUMIN SERPL-MCNC: 3.8 G/DL (ref 3.5–5)
ALBUMIN/GLOB SERPL: 1 (ref 1.1–2.2)
ALP SERPL-CCNC: 61 U/L (ref 45–117)
ALT SERPL-CCNC: 31 U/L (ref 12–78)
ANION GAP SERPL CALC-SCNC: 5 MMOL/L (ref 5–15)
AST SERPL-CCNC: 18 U/L (ref 15–37)
BASOPHILS # BLD: 0 K/UL (ref 0–0.1)
BASOPHILS NFR BLD: 0 % (ref 0–1)
BILIRUB SERPL-MCNC: 0.5 MG/DL (ref 0.2–1)
BUN SERPL-MCNC: 37 MG/DL (ref 6–20)
BUN/CREAT SERPL: 32 (ref 12–20)
CALCIUM SERPL-MCNC: 9.8 MG/DL (ref 8.5–10.1)
CHLORIDE SERPL-SCNC: 102 MMOL/L (ref 97–108)
CO2 SERPL-SCNC: 31 MMOL/L (ref 21–32)
CREAT SERPL-MCNC: 1.14 MG/DL (ref 0.7–1.3)
DIFFERENTIAL METHOD BLD: ABNORMAL
EOSINOPHIL # BLD: 0.1 K/UL (ref 0–0.4)
EOSINOPHIL NFR BLD: 1 % (ref 0–7)
ERYTHROCYTE [DISTWIDTH] IN BLOOD BY AUTOMATED COUNT: 11.9 % (ref 11.5–14.5)
GLOBULIN SER CALC-MCNC: 3.8 G/DL (ref 2–4)
GLUCOSE SERPL-MCNC: 108 MG/DL (ref 65–100)
HCT VFR BLD AUTO: 43.3 % (ref 36.6–50.3)
HGB BLD-MCNC: 15.1 G/DL (ref 12.1–17)
IMM GRANULOCYTES # BLD AUTO: 0 K/UL (ref 0–0.04)
IMM GRANULOCYTES NFR BLD AUTO: 0 % (ref 0–0.5)
INR PPP: 1 (ref 0.9–1.1)
LYMPHOCYTES # BLD: 1 K/UL (ref 0.8–3.5)
LYMPHOCYTES NFR BLD: 13 % (ref 12–49)
MCH RBC QN AUTO: 32.1 PG (ref 26–34)
MCHC RBC AUTO-ENTMCNC: 34.9 G/DL (ref 30–36.5)
MCV RBC AUTO: 91.9 FL (ref 80–99)
MONOCYTES # BLD: 0.7 K/UL (ref 0–1)
MONOCYTES NFR BLD: 9 % (ref 5–13)
NEUTS SEG # BLD: 5.8 K/UL (ref 1.8–8)
NEUTS SEG NFR BLD: 77 % (ref 32–75)
NRBC # BLD: 0 K/UL (ref 0–0.01)
NRBC BLD-RTO: 0 PER 100 WBC
NT PRO BNP: <5 PG/ML
PLATELET # BLD AUTO: 268 K/UL (ref 150–400)
PMV BLD AUTO: 9.3 FL (ref 8.9–12.9)
POTASSIUM SERPL-SCNC: 3.8 MMOL/L (ref 3.5–5.1)
PROT SERPL-MCNC: 7.6 G/DL (ref 6.4–8.2)
PROTHROMBIN TIME: 10.4 SEC (ref 9–11.1)
RBC # BLD AUTO: 4.71 M/UL (ref 4.1–5.7)
SODIUM SERPL-SCNC: 138 MMOL/L (ref 136–145)
TROPONIN I SERPL HS-MCNC: 4 NG/L (ref 0–76)
WBC # BLD AUTO: 7.7 K/UL (ref 4.1–11.1)

## 2024-01-03 PROCEDURE — 85610 PROTHROMBIN TIME: CPT

## 2024-01-03 PROCEDURE — 83880 ASSAY OF NATRIURETIC PEPTIDE: CPT

## 2024-01-03 PROCEDURE — 80053 COMPREHEN METABOLIC PANEL: CPT

## 2024-01-03 PROCEDURE — 93005 ELECTROCARDIOGRAM TRACING: CPT | Performed by: EMERGENCY MEDICINE

## 2024-01-03 PROCEDURE — 96375 TX/PRO/DX INJ NEW DRUG ADDON: CPT

## 2024-01-03 PROCEDURE — 85025 COMPLETE CBC W/AUTO DIFF WBC: CPT

## 2024-01-03 PROCEDURE — 6360000004 HC RX CONTRAST MEDICATION: Performed by: EMERGENCY MEDICINE

## 2024-01-03 PROCEDURE — 6360000002 HC RX W HCPCS: Performed by: EMERGENCY MEDICINE

## 2024-01-03 PROCEDURE — 84484 ASSAY OF TROPONIN QUANT: CPT

## 2024-01-03 PROCEDURE — 36415 COLL VENOUS BLD VENIPUNCTURE: CPT

## 2024-01-03 PROCEDURE — 99285 EMERGENCY DEPT VISIT HI MDM: CPT

## 2024-01-03 PROCEDURE — 96374 THER/PROPH/DIAG INJ IV PUSH: CPT

## 2024-01-03 PROCEDURE — 71275 CT ANGIOGRAPHY CHEST: CPT

## 2024-01-03 PROCEDURE — 2580000003 HC RX 258: Performed by: EMERGENCY MEDICINE

## 2024-01-03 RX ORDER — MORPHINE SULFATE 4 MG/ML
4 INJECTION, SOLUTION INTRAMUSCULAR; INTRAVENOUS
Status: COMPLETED | OUTPATIENT
Start: 2024-01-03 | End: 2024-01-03

## 2024-01-03 RX ORDER — 0.9 % SODIUM CHLORIDE 0.9 %
1000 INTRAVENOUS SOLUTION INTRAVENOUS ONCE
Status: COMPLETED | OUTPATIENT
Start: 2024-01-03 | End: 2024-01-03

## 2024-01-03 RX ORDER — KETOROLAC TROMETHAMINE 30 MG/ML
30 INJECTION, SOLUTION INTRAMUSCULAR; INTRAVENOUS ONCE
Status: COMPLETED | OUTPATIENT
Start: 2024-01-03 | End: 2024-01-03

## 2024-01-03 RX ORDER — ONDANSETRON 2 MG/ML
4 INJECTION INTRAMUSCULAR; INTRAVENOUS ONCE
Status: COMPLETED | OUTPATIENT
Start: 2024-01-03 | End: 2024-01-03

## 2024-01-03 RX ORDER — HYDROCODONE BITARTRATE AND ACETAMINOPHEN 5; 325 MG/1; MG/1
1 TABLET ORAL
Qty: 12 TABLET | Refills: 0 | Status: SHIPPED | OUTPATIENT
Start: 2024-01-03 | End: 2024-01-06

## 2024-01-03 RX ADMIN — ONDANSETRON 4 MG: 2 INJECTION INTRAMUSCULAR; INTRAVENOUS at 21:54

## 2024-01-03 RX ADMIN — IOPAMIDOL 100 ML: 755 INJECTION, SOLUTION INTRAVENOUS at 22:53

## 2024-01-03 RX ADMIN — SODIUM CHLORIDE 1000 ML: 9 INJECTION, SOLUTION INTRAVENOUS at 20:58

## 2024-01-03 RX ADMIN — KETOROLAC TROMETHAMINE 30 MG: 30 INJECTION, SOLUTION INTRAMUSCULAR; INTRAVENOUS at 23:15

## 2024-01-03 RX ADMIN — MORPHINE SULFATE 4 MG: 4 INJECTION, SOLUTION INTRAMUSCULAR; INTRAVENOUS at 23:16

## 2024-01-03 RX ADMIN — MORPHINE SULFATE 4 MG: 4 INJECTION, SOLUTION INTRAMUSCULAR; INTRAVENOUS at 21:54

## 2024-01-03 ASSESSMENT — PAIN DESCRIPTION - LOCATION: LOCATION: CHEST;SHOULDER

## 2024-01-03 ASSESSMENT — PAIN - FUNCTIONAL ASSESSMENT: PAIN_FUNCTIONAL_ASSESSMENT: 0-10

## 2024-01-03 ASSESSMENT — ENCOUNTER SYMPTOMS
SHORTNESS OF BREATH: 0
VOMITING: 0
NAUSEA: 0
COUGH: 0

## 2024-01-03 ASSESSMENT — PAIN SCALES - GENERAL
PAINLEVEL_OUTOF10: 6
PAINLEVEL_OUTOF10: 4

## 2024-01-03 ASSESSMENT — PAIN DESCRIPTION - ORIENTATION: ORIENTATION: LEFT;ANTERIOR

## 2024-01-03 ASSESSMENT — HEART SCORE: ECG: 0

## 2024-01-04 LAB
EKG ATRIAL RATE: 91 BPM
EKG DIAGNOSIS: NORMAL
EKG P AXIS: 30 DEGREES
EKG P-R INTERVAL: 162 MS
EKG Q-T INTERVAL: 346 MS
EKG QRS DURATION: 98 MS
EKG QTC CALCULATION (BAZETT): 425 MS
EKG R AXIS: 4 DEGREES
EKG T AXIS: 37 DEGREES
EKG VENTRICULAR RATE: 91 BPM

## 2024-01-04 PROCEDURE — 93010 ELECTROCARDIOGRAM REPORT: CPT | Performed by: SPECIALIST

## 2024-01-04 NOTE — DISCHARGE INSTRUCTIONS
Remember to use your incentive spirometer as we discussed.  Adjust your medication accordingly with the addition of hydrocodone/Tylenol as we discussed.  Follow-up with your primary care doctor to make sure you are doing better.  Return to the ER for any concerns or worsening symptoms

## 2024-01-04 NOTE — ED TRIAGE NOTES
Pt presents to ER with c/o left shoulder pain radiating to left chest that started yesterday. Pt describes pain as burning and worse with deep breathing. He recently had right patella surgery on 12/11/23, denies calf swelling. Denies dizziness, lightheadedness, SOB.

## 2024-01-04 NOTE — ED PROVIDER NOTES
[facility] EMERGENCY DEPT  EMERGENCY DEPARTMENT ENCOUNTER      Pt Name: Vega Matos  MRN: [id number]  Birthdate 1960  Date of evaluation: 1/3/2024  Provider: Ismael Vizcaino MD    CHIEF COMPLAINT       Chief Complaint   Patient presents with    Chest Pain    Shoulder Pain         HISTORY OF PRESENT ILLNESS    This is a 63-year-old male with past ministry of anemia, hypertension, recent surgery with revision of his knee arthroplasty in mid December presenting to the ER because of chest pain that started yesterday, left-sided, hurts with inspiration, no associated shortness of breath dizziness or lightheadedness.  When asked regarding right lower extremity if there is an additional cramping, he does report of the past couple days he had some slightly more increased pain.  He has been off his oxycodone and tramadol for a few weeks now has been doing well from a pain perspective -using Tylenol and ibuprofen            Review of External Medical Records:     Nursing Notes were reviewed.    REVIEW OF SYSTEMS       Review of Systems   Constitutional:  Negative for fever.   Respiratory:  Negative for cough and shortness of breath.    Cardiovascular:  Positive for chest pain. Negative for leg swelling.   Gastrointestinal:  Negative for nausea and vomiting.   Neurological:  Negative for syncope.       Except as noted above the remainder of the review of systems was reviewed and negative.       PAST MEDICAL HISTORY     Past Medical History:   Diagnosis Date    Anxiety     Arthritis     Benign microscopic hematuria     HTN (hypertension)     Hyperlipidemia     Hypoglycemia     Insomnia     ISMA on CPAP     Unspecified adverse effect of anesthesia     woke up in middle of surgery 2011         SURGICAL HISTORY       Past Surgical History:   Procedure Laterality Date    ANKLE SURGERY Left     torn ligament repair    CATARACT EXTRACTION, BILATERAL Bilateral     COLONOSCOPY      HERNIA REPAIR      umbilical hernia    RETINAL

## 2024-01-04 NOTE — ED NOTES
Reviewed pt's discharge paperwork with pt and  including prescriptions and follow up care.  Ensured pt had incentive spirometer.

## 2024-02-25 ENCOUNTER — HOSPITAL ENCOUNTER (EMERGENCY)
Facility: HOSPITAL | Age: 64
Discharge: HOME | DRG: 487 | End: 2024-02-26
Attending: STUDENT IN AN ORGANIZED HEALTH CARE EDUCATION/TRAINING PROGRAM
Payer: COMMERCIAL

## 2024-02-25 ENCOUNTER — APPOINTMENT (OUTPATIENT)
Facility: HOSPITAL | Age: 64
DRG: 487 | End: 2024-02-25
Payer: COMMERCIAL

## 2024-02-25 VITALS
HEIGHT: 72 IN | WEIGHT: 230 LBS | OXYGEN SATURATION: 98 % | HEART RATE: 87 BPM | BODY MASS INDEX: 31.15 KG/M2 | SYSTOLIC BLOOD PRESSURE: 148 MMHG | DIASTOLIC BLOOD PRESSURE: 83 MMHG | TEMPERATURE: 98.8 F | RESPIRATION RATE: 16 BRPM

## 2024-02-25 DIAGNOSIS — M25.469 KNEE SWELLING: ICD-10-CM

## 2024-02-25 DIAGNOSIS — M25.561 ACUTE PAIN OF RIGHT KNEE: Primary | ICD-10-CM

## 2024-02-25 LAB
ALBUMIN SERPL-MCNC: 3.6 G/DL (ref 3.5–5)
ALBUMIN/GLOB SERPL: 1.1 (ref 1.1–2.2)
ALP SERPL-CCNC: 59 U/L (ref 45–117)
ALT SERPL-CCNC: 20 U/L (ref 12–78)
ANION GAP SERPL CALC-SCNC: 2 MMOL/L (ref 5–15)
AST SERPL-CCNC: 16 U/L (ref 15–37)
BASOPHILS # BLD: 0 K/UL (ref 0–0.1)
BASOPHILS NFR BLD: 0 % (ref 0–1)
BILIRUB SERPL-MCNC: 0.6 MG/DL (ref 0.2–1)
BUN SERPL-MCNC: 28 MG/DL (ref 6–20)
BUN/CREAT SERPL: 26 (ref 12–20)
CALCIUM SERPL-MCNC: 9.2 MG/DL (ref 8.5–10.1)
CHLORIDE SERPL-SCNC: 103 MMOL/L (ref 97–108)
CO2 SERPL-SCNC: 33 MMOL/L (ref 21–32)
CREAT SERPL-MCNC: 1.07 MG/DL (ref 0.7–1.3)
CRP SERPL-MCNC: 17.5 MG/DL (ref 0–0.3)
DIFFERENTIAL METHOD BLD: ABNORMAL
EOSINOPHIL # BLD: 0.2 K/UL (ref 0–0.4)
EOSINOPHIL NFR BLD: 3 % (ref 0–7)
ERYTHROCYTE [DISTWIDTH] IN BLOOD BY AUTOMATED COUNT: 12.2 % (ref 11.5–14.5)
ERYTHROCYTE [SEDIMENTATION RATE] IN BLOOD: 39 MM/HR (ref 0–20)
GLOBULIN SER CALC-MCNC: 3.2 G/DL (ref 2–4)
GLUCOSE SERPL-MCNC: 141 MG/DL (ref 65–100)
HCT VFR BLD AUTO: 39.5 % (ref 36.6–50.3)
HGB BLD-MCNC: 13.9 G/DL (ref 12.1–17)
IMM GRANULOCYTES # BLD AUTO: 0 K/UL
IMM GRANULOCYTES NFR BLD AUTO: 0 %
LYMPHOCYTES # BLD: 0.7 K/UL (ref 0.8–3.5)
LYMPHOCYTES NFR BLD: 11 % (ref 12–49)
MCH RBC QN AUTO: 32 PG (ref 26–34)
MCHC RBC AUTO-ENTMCNC: 35.2 G/DL (ref 30–36.5)
MCV RBC AUTO: 90.8 FL (ref 80–99)
MONOCYTES # BLD: 1 K/UL (ref 0–1)
MONOCYTES NFR BLD: 16 % (ref 5–13)
NEUTS BAND NFR BLD MANUAL: 7 % (ref 0–6)
NEUTS SEG # BLD: 4.6 K/UL (ref 1.8–8)
NEUTS SEG NFR BLD: 63 % (ref 32–75)
NRBC # BLD: 0 K/UL (ref 0–0.01)
NRBC BLD-RTO: 0 PER 100 WBC
PLATELET # BLD AUTO: 234 K/UL (ref 150–400)
PMV BLD AUTO: 9.8 FL (ref 8.9–12.9)
POTASSIUM SERPL-SCNC: 3.5 MMOL/L (ref 3.5–5.1)
PROT SERPL-MCNC: 6.8 G/DL (ref 6.4–8.2)
RBC # BLD AUTO: 4.35 M/UL (ref 4.1–5.7)
RBC MORPH BLD: ABNORMAL
SODIUM SERPL-SCNC: 138 MMOL/L (ref 136–145)
WBC # BLD AUTO: 6.5 K/UL (ref 4.1–11.1)

## 2024-02-25 PROCEDURE — 73562 X-RAY EXAM OF KNEE 3: CPT

## 2024-02-25 PROCEDURE — 99284 EMERGENCY DEPT VISIT MOD MDM: CPT

## 2024-02-25 PROCEDURE — 36415 COLL VENOUS BLD VENIPUNCTURE: CPT

## 2024-02-25 PROCEDURE — 85652 RBC SED RATE AUTOMATED: CPT

## 2024-02-25 PROCEDURE — 80053 COMPREHEN METABOLIC PANEL: CPT

## 2024-02-25 PROCEDURE — 85025 COMPLETE CBC W/AUTO DIFF WBC: CPT

## 2024-02-25 PROCEDURE — 86140 C-REACTIVE PROTEIN: CPT

## 2024-02-25 PROCEDURE — 96365 THER/PROPH/DIAG IV INF INIT: CPT

## 2024-02-25 ASSESSMENT — PAIN - FUNCTIONAL ASSESSMENT: PAIN_FUNCTIONAL_ASSESSMENT: 0-10

## 2024-02-25 ASSESSMENT — PAIN DESCRIPTION - ORIENTATION: ORIENTATION: RIGHT

## 2024-02-25 ASSESSMENT — PAIN DESCRIPTION - LOCATION: LOCATION: KNEE

## 2024-02-25 ASSESSMENT — PAIN DESCRIPTION - DESCRIPTORS: DESCRIPTORS: ACHING

## 2024-02-25 ASSESSMENT — PAIN SCALES - GENERAL: PAINLEVEL_OUTOF10: 7

## 2024-02-26 PROCEDURE — 6360000002 HC RX W HCPCS: Performed by: STUDENT IN AN ORGANIZED HEALTH CARE EDUCATION/TRAINING PROGRAM

## 2024-02-26 PROCEDURE — 2580000003 HC RX 258: Performed by: STUDENT IN AN ORGANIZED HEALTH CARE EDUCATION/TRAINING PROGRAM

## 2024-02-26 RX ORDER — CEPHALEXIN 500 MG/1
500 CAPSULE ORAL 4 TIMES DAILY
Qty: 40 CAPSULE | Refills: 0 | Status: ON HOLD | OUTPATIENT
Start: 2024-02-26 | End: 2024-03-04 | Stop reason: HOSPADM

## 2024-02-26 RX ADMIN — SODIUM CHLORIDE 3000 MG: 900 INJECTION INTRAVENOUS at 00:14

## 2024-02-26 NOTE — ED PROVIDER NOTES
[facility] EMERGENCY DEPT  EMERGENCY DEPARTMENT ENCOUNTER      Pt Name: Vega Matos  MRN: [id number]  Birthdate 1960  Date of evaluation: 2/25/2024  Provider: Laura Greenberg DO    CHIEF COMPLAINT       Chief Complaint   Patient presents with    Knee Pain    Joint Swelling         HISTORY OF PRESENT ILLNESS    HPI    Vega Matos is a 63 y.o. male with a history of hypertension, hyperlipidemia who presents to the emergency department for evaluation of knee pain and swelling.  He had a total knee replacement on 12/5/2022 which was complicated by loosening of the knee Requiring repair in December 2023. Patient reports since this weekend he developed pain and swelling of the knee.  Notes pain with ambulation, and worsened pain with extension of the knee but is able to fully range the knee.  Does endorse that he has had some chills but no fever.  No other recent illness.    Nursing Notes were reviewed.    REVIEW OF SYSTEMS       Review of Systems   Constitutional:  Positive for chills. Negative for fever.   HENT:  Negative for congestion and rhinorrhea.    Eyes:  Negative for discharge and redness.   Respiratory:  Negative for cough.    Gastrointestinal:  Negative for abdominal pain, diarrhea, nausea and vomiting.   Musculoskeletal:  Positive for arthralgias and joint swelling.   Neurological:  Negative for speech difficulty.   Psychiatric/Behavioral:  Negative for agitation.            PAST MEDICAL HISTORY     Past Medical History:   Diagnosis Date    Anxiety     Arthritis     Benign microscopic hematuria     HTN (hypertension)     Hyperlipidemia     Hypoglycemia     Insomnia     ISMA on CPAP     Unspecified adverse effect of anesthesia     woke up in middle of surgery 2011         SURGICAL HISTORY       Past Surgical History:   Procedure Laterality Date    ANKLE SURGERY Left     torn ligament repair    CATARACT EXTRACTION, BILATERAL Bilateral     COLONOSCOPY      HERNIA REPAIR      umbilical hernia     understanding.        PROCEDURES:  Unless otherwise noted below, none     Procedures      FINAL IMPRESSION      1. Acute pain of right knee    2. Knee swelling          DISPOSITION/PLAN   DISPOSITION Discharge - Pending Orders Complete 02/26/2024 12:02:30 AM      PATIENT REFERRED TO:  Manuel Diamond MD  [address]  [address]  [place] [facility] [address] [phone]    Schedule an appointment as soon as possible for a visit       [facility] EMERGENCY DEPT  [address] [address] [facility] [address]  [phone]    If symptoms worsen      DISCHARGE MEDICATIONS:  New Prescriptions    CEPHALEXIN (KEFLEX) 500 MG CAPSULE    Take 1 capsule by mouth 4 times daily for 10 days         (Please note that portions of this note were completed with a voice recognition program.  Efforts were made to edit the dictations but occasionally words are mis-transcribed.)    Laura Greenberg DO (electronically signed)  Emergency Attending Physician / Physician Assistant / Nurse Practitioner         Laura Greenberg DO  02/27/24 0728

## 2024-02-26 NOTE — ED TRIAGE NOTES
Pt reports surgery on R knee in December. Pt states he noticed swelling at surgical site since Thursday. Pt denies fevers at home but states he has had chills. Pt states he took a hydrocodone at 1800 tonight for pain

## 2024-02-27 ASSESSMENT — ENCOUNTER SYMPTOMS
VOMITING: 0
ABDOMINAL PAIN: 0
NAUSEA: 0
DIARRHEA: 0
RHINORRHEA: 0
COUGH: 0
EYE DISCHARGE: 0
EYE REDNESS: 0

## 2024-02-28 ENCOUNTER — ANESTHESIA (OUTPATIENT)
Facility: HOSPITAL | Age: 64
End: 2024-02-28
Payer: COMMERCIAL

## 2024-02-28 ENCOUNTER — HOSPITAL ENCOUNTER (INPATIENT)
Facility: HOSPITAL | Age: 64
LOS: 5 days | Discharge: HOME | DRG: 487 | End: 2024-03-04
Attending: ORTHOPAEDIC SURGERY | Admitting: ORTHOPAEDIC SURGERY
Payer: COMMERCIAL

## 2024-02-28 ENCOUNTER — ANESTHESIA EVENT (OUTPATIENT)
Facility: HOSPITAL | Age: 64
End: 2024-02-28
Payer: COMMERCIAL

## 2024-02-28 DIAGNOSIS — Z96.659 INFECTION OF PROSTHETIC KNEE JOINT, INITIAL ENCOUNTER (HCC): Primary | ICD-10-CM

## 2024-02-28 DIAGNOSIS — T84.59XA INFECTION OF PROSTHETIC KNEE JOINT, INITIAL ENCOUNTER (HCC): Primary | ICD-10-CM

## 2024-02-28 PROBLEM — M71.161 SEPTIC PREPATELLAR BURSITIS OF RIGHT KNEE: Status: ACTIVE | Noted: 2024-02-28

## 2024-02-28 PROCEDURE — 3700000001 HC ADD 15 MINUTES (ANESTHESIA): Performed by: ORTHOPAEDIC SURGERY

## 2024-02-28 PROCEDURE — 2580000003 HC RX 258: Performed by: ANESTHESIOLOGY

## 2024-02-28 PROCEDURE — 0SPC09Z REMOVAL OF LINER FROM RIGHT KNEE JOINT, OPEN APPROACH: ICD-10-PCS | Performed by: ORTHOPAEDIC SURGERY

## 2024-02-28 PROCEDURE — 2500000003 HC RX 250 WO HCPCS: Performed by: NURSE ANESTHETIST, CERTIFIED REGISTERED

## 2024-02-28 PROCEDURE — 0SUV09Z SUPPLEMENT RIGHT KNEE JOINT, TIBIAL SURFACE WITH LINER, OPEN APPROACH: ICD-10-PCS | Performed by: ORTHOPAEDIC SURGERY

## 2024-02-28 PROCEDURE — 6360000002 HC RX W HCPCS: Performed by: NURSE ANESTHETIST, CERTIFIED REGISTERED

## 2024-02-28 PROCEDURE — 6370000000 HC RX 637 (ALT 250 FOR IP): Performed by: ORTHOPAEDIC SURGERY

## 2024-02-28 PROCEDURE — 87186 SC STD MICRODIL/AGAR DIL: CPT

## 2024-02-28 PROCEDURE — 87070 CULTURE OTHR SPECIMN AEROBIC: CPT

## 2024-02-28 PROCEDURE — 6360000002 HC RX W HCPCS: Performed by: ORTHOPAEDIC SURGERY

## 2024-02-28 PROCEDURE — C1776 JOINT DEVICE (IMPLANTABLE): HCPCS | Performed by: ORTHOPAEDIC SURGERY

## 2024-02-28 PROCEDURE — 2709999900 HC NON-CHARGEABLE SUPPLY: Performed by: ORTHOPAEDIC SURGERY

## 2024-02-28 PROCEDURE — 3600000002 HC SURGERY LEVEL 2 BASE: Performed by: ORTHOPAEDIC SURGERY

## 2024-02-28 PROCEDURE — 7100000001 HC PACU RECOVERY - ADDTL 15 MIN: Performed by: ORTHOPAEDIC SURGERY

## 2024-02-28 PROCEDURE — 2580000003 HC RX 258: Performed by: NURSE ANESTHETIST, CERTIFIED REGISTERED

## 2024-02-28 PROCEDURE — 2580000003 HC RX 258: Performed by: ORTHOPAEDIC SURGERY

## 2024-02-28 PROCEDURE — 87205 SMEAR GRAM STAIN: CPT

## 2024-02-28 PROCEDURE — 6360000002 HC RX W HCPCS: Performed by: ANESTHESIOLOGY

## 2024-02-28 PROCEDURE — 87077 CULTURE AEROBIC IDENTIFY: CPT

## 2024-02-28 PROCEDURE — 1100000000 HC RM PRIVATE

## 2024-02-28 PROCEDURE — 3600000012 HC SURGERY LEVEL 2 ADDTL 15MIN: Performed by: ORTHOPAEDIC SURGERY

## 2024-02-28 PROCEDURE — 3700000000 HC ANESTHESIA ATTENDED CARE: Performed by: ORTHOPAEDIC SURGERY

## 2024-02-28 PROCEDURE — 7100000000 HC PACU RECOVERY - FIRST 15 MIN: Performed by: ORTHOPAEDIC SURGERY

## 2024-02-28 DEVICE — TIBIAL BEARING INSERT - CS
Type: IMPLANTABLE DEVICE | Status: FUNCTIONAL
Brand: TRIATHLON

## 2024-02-28 RX ORDER — DIPHENHYDRAMINE HYDROCHLORIDE 50 MG/ML
25 INJECTION INTRAMUSCULAR; INTRAVENOUS EVERY 6 HOURS PRN
Status: DISCONTINUED | OUTPATIENT
Start: 2024-02-28 | End: 2024-03-04 | Stop reason: HOSPADM

## 2024-02-28 RX ORDER — OXYCODONE HYDROCHLORIDE 5 MG/1
5 TABLET ORAL
Status: DISCONTINUED | OUTPATIENT
Start: 2024-02-28 | End: 2024-03-04 | Stop reason: HOSPADM

## 2024-02-28 RX ORDER — 0.9 % SODIUM CHLORIDE 0.9 %
500 INTRAVENOUS SOLUTION INTRAVENOUS ONCE
Status: DISCONTINUED | OUTPATIENT
Start: 2024-02-28 | End: 2024-03-04 | Stop reason: HOSPADM

## 2024-02-28 RX ORDER — ONDANSETRON 2 MG/ML
INJECTION INTRAMUSCULAR; INTRAVENOUS PRN
Status: DISCONTINUED | OUTPATIENT
Start: 2024-02-28 | End: 2024-02-28 | Stop reason: SDUPTHER

## 2024-02-28 RX ORDER — SODIUM CHLORIDE, SODIUM LACTATE, POTASSIUM CHLORIDE, CALCIUM CHLORIDE 600; 310; 30; 20 MG/100ML; MG/100ML; MG/100ML; MG/100ML
INJECTION, SOLUTION INTRAVENOUS CONTINUOUS
Status: DISCONTINUED | OUTPATIENT
Start: 2024-02-28 | End: 2024-03-04 | Stop reason: HOSPADM

## 2024-02-28 RX ORDER — ONDANSETRON 2 MG/ML
4 INJECTION INTRAMUSCULAR; INTRAVENOUS
Status: DISCONTINUED | OUTPATIENT
Start: 2024-02-28 | End: 2024-02-28 | Stop reason: HOSPADM

## 2024-02-28 RX ORDER — SUCCINYLCHOLINE/SOD CL,ISO/PF 100 MG/5ML
SYRINGE (ML) INTRAVENOUS PRN
Status: DISCONTINUED | OUTPATIENT
Start: 2024-02-28 | End: 2024-02-28 | Stop reason: SDUPTHER

## 2024-02-28 RX ORDER — ACETAMINOPHEN 325 MG/1
650 TABLET ORAL EVERY 6 HOURS
Status: DISCONTINUED | OUTPATIENT
Start: 2024-02-28 | End: 2024-03-04 | Stop reason: HOSPADM

## 2024-02-28 RX ORDER — DEXMEDETOMIDINE HYDROCHLORIDE 100 UG/ML
INJECTION, SOLUTION INTRAVENOUS PRN
Status: DISCONTINUED | OUTPATIENT
Start: 2024-02-28 | End: 2024-02-28 | Stop reason: SDUPTHER

## 2024-02-28 RX ORDER — GLYCOPYRROLATE 0.2 MG/ML
INJECTION INTRAMUSCULAR; INTRAVENOUS PRN
Status: DISCONTINUED | OUTPATIENT
Start: 2024-02-28 | End: 2024-02-28 | Stop reason: SDUPTHER

## 2024-02-28 RX ORDER — POLYETHYLENE GLYCOL 3350 17 G/17G
17 POWDER, FOR SOLUTION ORAL DAILY
Status: DISCONTINUED | OUTPATIENT
Start: 2024-02-29 | End: 2024-03-04 | Stop reason: HOSPADM

## 2024-02-28 RX ORDER — SODIUM CHLORIDE, SODIUM LACTATE, POTASSIUM CHLORIDE, CALCIUM CHLORIDE 600; 310; 30; 20 MG/100ML; MG/100ML; MG/100ML; MG/100ML
INJECTION, SOLUTION INTRAVENOUS CONTINUOUS
Status: DISCONTINUED | OUTPATIENT
Start: 2024-02-28 | End: 2024-03-01

## 2024-02-28 RX ORDER — HYDROCHLOROTHIAZIDE 25 MG/1
12.5 TABLET ORAL DAILY
Status: DISCONTINUED | OUTPATIENT
Start: 2024-02-29 | End: 2024-03-04 | Stop reason: HOSPADM

## 2024-02-28 RX ORDER — ASPIRIN 81 MG/1
81 TABLET ORAL 2 TIMES DAILY
Status: DISCONTINUED | OUTPATIENT
Start: 2024-02-28 | End: 2024-03-04 | Stop reason: HOSPADM

## 2024-02-28 RX ORDER — SODIUM CHLORIDE, SODIUM LACTATE, POTASSIUM CHLORIDE, CALCIUM CHLORIDE 600; 310; 30; 20 MG/100ML; MG/100ML; MG/100ML; MG/100ML
INJECTION, SOLUTION INTRAVENOUS CONTINUOUS PRN
Status: DISCONTINUED | OUTPATIENT
Start: 2024-02-28 | End: 2024-02-28 | Stop reason: SDUPTHER

## 2024-02-28 RX ORDER — DIPHENHYDRAMINE HCL 25 MG
25 CAPSULE ORAL EVERY 6 HOURS PRN
Status: DISCONTINUED | OUTPATIENT
Start: 2024-02-28 | End: 2024-03-04 | Stop reason: HOSPADM

## 2024-02-28 RX ORDER — DEXAMETHASONE SODIUM PHOSPHATE 4 MG/ML
INJECTION, SOLUTION INTRA-ARTICULAR; INTRALESIONAL; INTRAMUSCULAR; INTRAVENOUS; SOFT TISSUE PRN
Status: DISCONTINUED | OUTPATIENT
Start: 2024-02-28 | End: 2024-02-28 | Stop reason: SDUPTHER

## 2024-02-28 RX ORDER — LIDOCAINE HYDROCHLORIDE 10 MG/ML
1 INJECTION, SOLUTION EPIDURAL; INFILTRATION; INTRACAUDAL; PERINEURAL
Status: ACTIVE | OUTPATIENT
Start: 2024-02-28 | End: 2024-02-29

## 2024-02-28 RX ORDER — DIPHENHYDRAMINE HYDROCHLORIDE 50 MG/ML
12.5 INJECTION INTRAMUSCULAR; INTRAVENOUS
Status: DISCONTINUED | OUTPATIENT
Start: 2024-02-28 | End: 2024-02-28 | Stop reason: HOSPADM

## 2024-02-28 RX ORDER — FENTANYL CITRATE 50 UG/ML
100 INJECTION, SOLUTION INTRAMUSCULAR; INTRAVENOUS
Status: ACTIVE | OUTPATIENT
Start: 2024-02-28 | End: 2024-02-29

## 2024-02-28 RX ORDER — MEPERIDINE HYDROCHLORIDE 25 MG/ML
12.5 INJECTION INTRAMUSCULAR; INTRAVENOUS; SUBCUTANEOUS EVERY 4 HOURS PRN
Status: DISCONTINUED | OUTPATIENT
Start: 2024-02-28 | End: 2024-03-01

## 2024-02-28 RX ORDER — BISACODYL 10 MG
10 SUPPOSITORY, RECTAL RECTAL DAILY PRN
Status: DISCONTINUED | OUTPATIENT
Start: 2024-02-28 | End: 2024-03-04 | Stop reason: HOSPADM

## 2024-02-28 RX ORDER — SODIUM CHLORIDE 9 MG/ML
INJECTION, SOLUTION INTRAVENOUS CONTINUOUS
Status: DISCONTINUED | OUTPATIENT
Start: 2024-02-28 | End: 2024-03-04 | Stop reason: HOSPADM

## 2024-02-28 RX ORDER — TRAZODONE HYDROCHLORIDE 100 MG/1
100 TABLET ORAL NIGHTLY
Status: DISCONTINUED | OUTPATIENT
Start: 2024-02-28 | End: 2024-03-04 | Stop reason: HOSPADM

## 2024-02-28 RX ORDER — ATORVASTATIN CALCIUM 10 MG/1
10 TABLET, FILM COATED ORAL DAILY
Status: DISCONTINUED | OUTPATIENT
Start: 2024-02-29 | End: 2024-03-04 | Stop reason: HOSPADM

## 2024-02-28 RX ORDER — IBUPROFEN 600 MG/1
600 TABLET ORAL EVERY 8 HOURS
Status: DISPENSED | OUTPATIENT
Start: 2024-02-28 | End: 2024-03-02

## 2024-02-28 RX ORDER — ONDANSETRON 4 MG/1
4 TABLET, ORALLY DISINTEGRATING ORAL EVERY 8 HOURS PRN
Status: DISCONTINUED | OUTPATIENT
Start: 2024-02-28 | End: 2024-03-04 | Stop reason: HOSPADM

## 2024-02-28 RX ORDER — ONDANSETRON 2 MG/ML
4 INJECTION INTRAMUSCULAR; INTRAVENOUS EVERY 6 HOURS PRN
Status: DISCONTINUED | OUTPATIENT
Start: 2024-02-28 | End: 2024-03-04 | Stop reason: HOSPADM

## 2024-02-28 RX ORDER — NEOSTIGMINE METHYLSULFATE 1 MG/ML
INJECTION, SOLUTION INTRAVENOUS PRN
Status: DISCONTINUED | OUTPATIENT
Start: 2024-02-28 | End: 2024-02-28 | Stop reason: SDUPTHER

## 2024-02-28 RX ORDER — HYDROMORPHONE HYDROCHLORIDE 1 MG/ML
1 INJECTION, SOLUTION INTRAMUSCULAR; INTRAVENOUS; SUBCUTANEOUS
Status: DISCONTINUED | OUTPATIENT
Start: 2024-02-28 | End: 2024-03-04 | Stop reason: HOSPADM

## 2024-02-28 RX ORDER — SODIUM CHLORIDE 9 MG/ML
INJECTION, SOLUTION INTRAVENOUS PRN
Status: DISCONTINUED | OUTPATIENT
Start: 2024-02-28 | End: 2024-03-04 | Stop reason: HOSPADM

## 2024-02-28 RX ORDER — MIDAZOLAM HYDROCHLORIDE 1 MG/ML
INJECTION INTRAMUSCULAR; INTRAVENOUS PRN
Status: DISCONTINUED | OUTPATIENT
Start: 2024-02-28 | End: 2024-02-28 | Stop reason: SDUPTHER

## 2024-02-28 RX ORDER — LISINOPRIL 5 MG/1
10 TABLET ORAL DAILY
Status: DISCONTINUED | OUTPATIENT
Start: 2024-02-29 | End: 2024-03-04 | Stop reason: HOSPADM

## 2024-02-28 RX ORDER — SODIUM CHLORIDE 0.9 % (FLUSH) 0.9 %
5-40 SYRINGE (ML) INJECTION EVERY 12 HOURS SCHEDULED
Status: DISCONTINUED | OUTPATIENT
Start: 2024-02-28 | End: 2024-03-04 | Stop reason: HOSPADM

## 2024-02-28 RX ORDER — LISINOPRIL AND HYDROCHLOROTHIAZIDE 12.5; 1 MG/1; MG/1
1 TABLET ORAL DAILY
Status: DISCONTINUED | OUTPATIENT
Start: 2024-02-29 | End: 2024-02-28

## 2024-02-28 RX ORDER — BISACODYL 5 MG/1
5 TABLET, DELAYED RELEASE ORAL DAILY PRN
Status: DISCONTINUED | OUTPATIENT
Start: 2024-02-28 | End: 2024-03-04 | Stop reason: HOSPADM

## 2024-02-28 RX ORDER — MEPERIDINE HYDROCHLORIDE 25 MG/ML
12.5 INJECTION INTRAMUSCULAR; INTRAVENOUS; SUBCUTANEOUS ONCE
Status: COMPLETED | OUTPATIENT
Start: 2024-02-28 | End: 2024-02-28

## 2024-02-28 RX ORDER — FENTANYL CITRATE 50 UG/ML
INJECTION, SOLUTION INTRAMUSCULAR; INTRAVENOUS PRN
Status: DISCONTINUED | OUTPATIENT
Start: 2024-02-28 | End: 2024-02-28 | Stop reason: SDUPTHER

## 2024-02-28 RX ORDER — M-VIT,TX,IRON,MINS/CALC/FOLIC 27MG-0.4MG
1 TABLET ORAL DAILY
Status: DISCONTINUED | OUTPATIENT
Start: 2024-02-29 | End: 2024-02-29

## 2024-02-28 RX ORDER — OXYCODONE HYDROCHLORIDE 5 MG/1
10 TABLET ORAL
Status: DISCONTINUED | OUTPATIENT
Start: 2024-02-28 | End: 2024-03-04 | Stop reason: HOSPADM

## 2024-02-28 RX ORDER — MIDAZOLAM HYDROCHLORIDE 2 MG/2ML
2 INJECTION, SOLUTION INTRAMUSCULAR; INTRAVENOUS
Status: ACTIVE | OUTPATIENT
Start: 2024-02-28 | End: 2024-02-29

## 2024-02-28 RX ORDER — CETIRIZINE HYDROCHLORIDE 10 MG/1
10 TABLET ORAL DAILY
Status: DISCONTINUED | OUTPATIENT
Start: 2024-02-29 | End: 2024-02-29

## 2024-02-28 RX ORDER — PROPOFOL 10 MG/ML
INJECTION, EMULSION INTRAVENOUS PRN
Status: DISCONTINUED | OUTPATIENT
Start: 2024-02-28 | End: 2024-02-28 | Stop reason: SDUPTHER

## 2024-02-28 RX ORDER — SODIUM CHLORIDE 0.9 % (FLUSH) 0.9 %
5-40 SYRINGE (ML) INJECTION PRN
Status: DISCONTINUED | OUTPATIENT
Start: 2024-02-28 | End: 2024-03-04 | Stop reason: HOSPADM

## 2024-02-28 RX ORDER — ROCURONIUM BROMIDE 10 MG/ML
INJECTION, SOLUTION INTRAVENOUS PRN
Status: DISCONTINUED | OUTPATIENT
Start: 2024-02-28 | End: 2024-02-28 | Stop reason: SDUPTHER

## 2024-02-28 RX ORDER — FAMOTIDINE 20 MG/1
20 TABLET, FILM COATED ORAL 2 TIMES DAILY
Status: DISCONTINUED | OUTPATIENT
Start: 2024-02-28 | End: 2024-03-04 | Stop reason: HOSPADM

## 2024-02-28 RX ADMIN — HYDROMORPHONE HYDROCHLORIDE 0.5 MG: 1 INJECTION, SOLUTION INTRAMUSCULAR; INTRAVENOUS; SUBCUTANEOUS at 21:14

## 2024-02-28 RX ADMIN — FENTANYL CITRATE 50 MCG: 50 INJECTION, SOLUTION INTRAMUSCULAR; INTRAVENOUS at 19:42

## 2024-02-28 RX ADMIN — ROCURONIUM BROMIDE 30 MG: 10 INJECTION, SOLUTION INTRAVENOUS at 18:51

## 2024-02-28 RX ADMIN — FENTANYL CITRATE 25 MCG: 50 INJECTION, SOLUTION INTRAMUSCULAR; INTRAVENOUS at 19:59

## 2024-02-28 RX ADMIN — SODIUM CHLORIDE, PRESERVATIVE FREE 10 ML: 5 INJECTION INTRAVENOUS at 23:52

## 2024-02-28 RX ADMIN — MIDAZOLAM HYDROCHLORIDE 2 MG: 1 INJECTION, SOLUTION INTRAMUSCULAR; INTRAVENOUS at 18:39

## 2024-02-28 RX ADMIN — DEXMEDETOMIDINE 10 MCG: 100 INJECTION, SOLUTION INTRAVENOUS at 19:12

## 2024-02-28 RX ADMIN — Medication 3 MG: at 19:45

## 2024-02-28 RX ADMIN — IBUPROFEN 600 MG: 600 TABLET, FILM COATED ORAL at 23:49

## 2024-02-28 RX ADMIN — LIDOCAINE HYDROCHLORIDE 80 MG: 20 INJECTION, SOLUTION EPIDURAL; INFILTRATION; INTRACAUDAL; PERINEURAL at 18:47

## 2024-02-28 RX ADMIN — ACETAMINOPHEN 650 MG: 325 TABLET ORAL at 23:59

## 2024-02-28 RX ADMIN — SODIUM CHLORIDE, POTASSIUM CHLORIDE, SODIUM LACTATE AND CALCIUM CHLORIDE: 600; 310; 30; 20 INJECTION, SOLUTION INTRAVENOUS at 20:38

## 2024-02-28 RX ADMIN — SODIUM CHLORIDE: 9 INJECTION, SOLUTION INTRAVENOUS at 23:51

## 2024-02-28 RX ADMIN — SODIUM CHLORIDE, POTASSIUM CHLORIDE, SODIUM LACTATE AND CALCIUM CHLORIDE: 600; 310; 30; 20 INJECTION, SOLUTION INTRAVENOUS at 22:47

## 2024-02-28 RX ADMIN — GLYCOPYRROLATE 0.6 MG: 0.2 INJECTION INTRAMUSCULAR; INTRAVENOUS at 19:45

## 2024-02-28 RX ADMIN — WATER 2000 MG: 1 INJECTION INTRAMUSCULAR; INTRAVENOUS; SUBCUTANEOUS at 23:50

## 2024-02-28 RX ADMIN — FENTANYL CITRATE 100 MCG: 50 INJECTION, SOLUTION INTRAMUSCULAR; INTRAVENOUS at 18:44

## 2024-02-28 RX ADMIN — FENTANYL CITRATE 25 MCG: 50 INJECTION, SOLUTION INTRAMUSCULAR; INTRAVENOUS at 19:53

## 2024-02-28 RX ADMIN — PROPOFOL 180 MG: 10 INJECTION, EMULSION INTRAVENOUS at 18:47

## 2024-02-28 RX ADMIN — TRAZODONE HYDROCHLORIDE 100 MG: 100 TABLET ORAL at 23:49

## 2024-02-28 RX ADMIN — FAMOTIDINE 20 MG: 20 TABLET, FILM COATED ORAL at 23:50

## 2024-02-28 RX ADMIN — ROCURONIUM BROMIDE 10 MG: 10 INJECTION, SOLUTION INTRAVENOUS at 18:47

## 2024-02-28 RX ADMIN — MEPERIDINE HYDROCHLORIDE 12.5 MG: 25 INJECTION INTRAMUSCULAR; INTRAVENOUS; SUBCUTANEOUS at 20:55

## 2024-02-28 RX ADMIN — ONDANSETRON 4 MG: 2 INJECTION INTRAMUSCULAR; INTRAVENOUS at 18:55

## 2024-02-28 RX ADMIN — SODIUM CHLORIDE, POTASSIUM CHLORIDE, SODIUM LACTATE AND CALCIUM CHLORIDE: 600; 310; 30; 20 INJECTION, SOLUTION INTRAVENOUS at 18:39

## 2024-02-28 RX ADMIN — Medication 140 MG: at 18:47

## 2024-02-28 RX ADMIN — ROCURONIUM BROMIDE 10 MG: 10 INJECTION, SOLUTION INTRAVENOUS at 19:12

## 2024-02-28 RX ADMIN — PROPOFOL 50 MCG/KG/MIN: 10 INJECTION, EMULSION INTRAVENOUS at 18:48

## 2024-02-28 RX ADMIN — WATER 2000 MG: 1 INJECTION INTRAMUSCULAR; INTRAVENOUS; SUBCUTANEOUS at 18:53

## 2024-02-28 RX ADMIN — ASPIRIN 81 MG: 81 TABLET, COATED ORAL at 23:49

## 2024-02-28 RX ADMIN — DEXAMETHASONE SODIUM PHOSPHATE 4 MG: 4 INJECTION INTRA-ARTICULAR; INTRALESIONAL; INTRAMUSCULAR; INTRAVENOUS; SOFT TISSUE at 18:55

## 2024-02-28 RX ADMIN — DEXMEDETOMIDINE 10 MCG: 100 INJECTION, SOLUTION INTRAVENOUS at 19:05

## 2024-02-28 ASSESSMENT — PAIN SCALES - GENERAL
PAINLEVEL_OUTOF10: 5
PAINLEVEL_OUTOF10: 5
PAINLEVEL_OUTOF10: 4
PAINLEVEL_OUTOF10: 4
PAINLEVEL_OUTOF10: 5
PAINLEVEL_OUTOF10: 4

## 2024-02-28 ASSESSMENT — PAIN DESCRIPTION - DESCRIPTORS
DESCRIPTORS: ACHING;SORE;THROBBING
DESCRIPTORS: THROBBING
DESCRIPTORS: ACHING;SORE;TENDER
DESCRIPTORS: ACHING;SORE
DESCRIPTORS: ACHING;SORE

## 2024-02-28 ASSESSMENT — PAIN - FUNCTIONAL ASSESSMENT
PAIN_FUNCTIONAL_ASSESSMENT: PREVENTS OR INTERFERES SOME ACTIVE ACTIVITIES AND ADLS
PAIN_FUNCTIONAL_ASSESSMENT: 0-10
PAIN_FUNCTIONAL_ASSESSMENT: PREVENTS OR INTERFERES SOME ACTIVE ACTIVITIES AND ADLS

## 2024-02-28 ASSESSMENT — PAIN DESCRIPTION - ORIENTATION
ORIENTATION: RIGHT

## 2024-02-28 ASSESSMENT — PAIN DESCRIPTION - LOCATION
LOCATION: KNEE
LOCATION: KNEE
LOCATION: CHEST;KNEE
LOCATION: KNEE

## 2024-02-28 ASSESSMENT — PAIN DESCRIPTION - FREQUENCY: FREQUENCY: CONTINUOUS

## 2024-02-28 ASSESSMENT — PAIN DESCRIPTION - PAIN TYPE: TYPE: SURGICAL PAIN

## 2024-02-28 NOTE — ANESTHESIA PRE PROCEDURE
\"PREGSERUM\", \"HCG\", \"HCGQUANT\"     ABGs: No results found for: \"PHART\", \"PO2ART\", \"TKG8PRB\", \"KNO6FIW\", \"BEART\", \"Q4GSXDGH\"     Type & Screen (If Applicable):  No results found for: \"LABABO\", \"LABRH\"    Drug/Infectious Status (If Applicable):  No results found for: \"HIV\", \"HEPCAB\"    COVID-19 Screening (If Applicable): No results found for: \"COVID19\"        Anesthesia Evaluation     no history of anesthetic complications:   Airway: Mallampati: II     Neck ROM: full  Mouth opening: > = 3 FB   Dental: normal exam         Pulmonary: breath sounds clear to auscultation  (+)     sleep apnea:                                  Cardiovascular:    (+) hypertension:, hyperlipidemia        Rhythm: regular  Rate: normal                    Neuro/Psych:               GI/Hepatic/Renal:   (+) morbid obesity          Endo/Other:    (+) : arthritis:..                  ROS comment: Revision TKA vs partial Abdominal:             Vascular: negative vascular ROS.         Other Findings:       Anesthesia Plan      general     ASA 2       Induction: intravenous.    MIPS: Postoperative opioids intended and Prophylactic antiemetics administered.  Anesthetic plan and risks discussed with patient.      Plan discussed with CRNA.    Attending anesthesiologist reviewed and agrees with Preprocedure content            Matthew Burkett MD   2/28/2024

## 2024-02-28 NOTE — H&P
of Onset    Hypertension Mother     Hypertension Father     Diabetes Father         Review of Systems  A comprehensive review of systems was negative except for that written in the HPI.        Objective:   No data found.  No data recorded.      Gen: Well-developed,  in no acute distress   HEENT: Pink conjunctivae  Neck: Supple, without obvious masses  Resp: No accessory muscle use, no acute respiratory distress   Card: RRR  Abd: Supple  Lymph: No obvious lymphadenopathy of the affectedv extremity  Musculo-skeletal : Right knee marked medial STS and erythema   Skin: No skin breakdown noted.   Neuro: Cranial nerves are grossly intact,follows commands appropriately   Psych: Alert and oriented x 3      LABS :    Recent Labs     02/25/24  2133   HGB 13.9   HCT 39.5      K 3.5      CO2 33*   BUN 28*       X-RAYS : Stable implants.   Assessment:   Pre-patellar bursitis (septic) and possible deep infection      Plan:   Proceed with surgical intervention without contraindications. I discussed surgical indications and alternatives with the patient. Risks including infection, bleeding, damage to other structure, need for further surgery and the risks of anesthesia have been discussed with the patient. Verbal and written consent were obtained.     Based on MRSA screening prior to surgery Vancomycin may be given at the surgeon discretion based on allergies and availability.     Manuel Diamond MD  Cell [phone]  Medical Staff : Laine Baker / Lisy Win  Office : [phone] ext  [phone]

## 2024-02-29 LAB
ANION GAP SERPL CALC-SCNC: 5 MMOL/L (ref 5–15)
BACTERIA SPEC CULT: NORMAL
BUN SERPL-MCNC: 26 MG/DL (ref 6–20)
BUN/CREAT SERPL: 25 (ref 12–20)
CALCIUM SERPL-MCNC: 9 MG/DL (ref 8.5–10.1)
CHLORIDE SERPL-SCNC: 102 MMOL/L (ref 97–108)
CO2 SERPL-SCNC: 27 MMOL/L (ref 21–32)
CREAT SERPL-MCNC: 1.02 MG/DL (ref 0.7–1.3)
GLUCOSE SERPL-MCNC: 162 MG/DL (ref 65–100)
GRAM STN SPEC: NORMAL
GRAM STN SPEC: NORMAL
HCT VFR BLD AUTO: 36.4 % (ref 36.6–50.3)
HGB BLD-MCNC: 12.6 G/DL (ref 12.1–17)
POTASSIUM SERPL-SCNC: 4.6 MMOL/L (ref 3.5–5.1)
SERVICE CMNT-IMP: NORMAL
SODIUM SERPL-SCNC: 134 MMOL/L (ref 136–145)

## 2024-02-29 PROCEDURE — 2580000003 HC RX 258: Performed by: INTERNAL MEDICINE

## 2024-02-29 PROCEDURE — 36415 COLL VENOUS BLD VENIPUNCTURE: CPT

## 2024-02-29 PROCEDURE — APPNB30 APP NON BILLABLE TIME 0-30 MINS: Performed by: NURSE PRACTITIONER

## 2024-02-29 PROCEDURE — 6360000002 HC RX W HCPCS: Performed by: ORTHOPAEDIC SURGERY

## 2024-02-29 PROCEDURE — 2580000003 HC RX 258: Performed by: ORTHOPAEDIC SURGERY

## 2024-02-29 PROCEDURE — 97161 PT EVAL LOW COMPLEX 20 MIN: CPT

## 2024-02-29 PROCEDURE — 97530 THERAPEUTIC ACTIVITIES: CPT

## 2024-02-29 PROCEDURE — 6360000002 HC RX W HCPCS: Performed by: INTERNAL MEDICINE

## 2024-02-29 PROCEDURE — 94761 N-INVAS EAR/PLS OXIMETRY MLT: CPT

## 2024-02-29 PROCEDURE — 80048 BASIC METABOLIC PNL TOTAL CA: CPT

## 2024-02-29 PROCEDURE — 97116 GAIT TRAINING THERAPY: CPT

## 2024-02-29 PROCEDURE — 1100000000 HC RM PRIVATE

## 2024-02-29 PROCEDURE — 6370000000 HC RX 637 (ALT 250 FOR IP): Performed by: ORTHOPAEDIC SURGERY

## 2024-02-29 PROCEDURE — 85018 HEMOGLOBIN: CPT

## 2024-02-29 PROCEDURE — 85014 HEMATOCRIT: CPT

## 2024-02-29 RX ORDER — GUAIFENESIN 600 MG/1
600 TABLET, EXTENDED RELEASE ORAL 2 TIMES DAILY
Status: DISCONTINUED | OUTPATIENT
Start: 2024-02-29 | End: 2024-03-04 | Stop reason: HOSPADM

## 2024-02-29 RX ORDER — MULTIVITAMIN WITH IRON
1 TABLET ORAL DAILY
Status: DISCONTINUED | OUTPATIENT
Start: 2024-02-29 | End: 2024-03-04 | Stop reason: HOSPADM

## 2024-02-29 RX ORDER — IBUPROFEN 200 MG
2 TABLET ORAL EVERY 6 HOURS PRN
COMMUNITY

## 2024-02-29 RX ORDER — HYDROMORPHONE HYDROCHLORIDE 2 MG/1
2 TABLET ORAL EVERY 4 HOURS PRN
Status: ON HOLD | COMMUNITY
End: 2024-03-04

## 2024-02-29 RX ADMIN — ASPIRIN 81 MG: 81 TABLET, COATED ORAL at 21:52

## 2024-02-29 RX ADMIN — POLYETHYLENE GLYCOL 3350 17 G: 17 POWDER, FOR SOLUTION ORAL at 08:57

## 2024-02-29 RX ADMIN — THERA TABS 1 TABLET: TAB at 12:39

## 2024-02-29 RX ADMIN — SODIUM CHLORIDE: 9 INJECTION, SOLUTION INTRAVENOUS at 08:53

## 2024-02-29 RX ADMIN — ACETAMINOPHEN 650 MG: 325 TABLET ORAL at 17:50

## 2024-02-29 RX ADMIN — SODIUM CHLORIDE, PRESERVATIVE FREE 5 ML: 5 INJECTION INTRAVENOUS at 08:56

## 2024-02-29 RX ADMIN — OXYCODONE 5 MG: 5 TABLET ORAL at 12:39

## 2024-02-29 RX ADMIN — Medication 2500 MG: at 18:35

## 2024-02-29 RX ADMIN — GUAIFENESIN 600 MG: 600 TABLET ORAL at 21:53

## 2024-02-29 RX ADMIN — IBUPROFEN 600 MG: 600 TABLET, FILM COATED ORAL at 15:26

## 2024-02-29 RX ADMIN — TRAZODONE HYDROCHLORIDE 100 MG: 100 TABLET ORAL at 21:52

## 2024-02-29 RX ADMIN — SODIUM CHLORIDE: 9 INJECTION, SOLUTION INTRAVENOUS at 15:27

## 2024-02-29 RX ADMIN — IBUPROFEN 600 MG: 600 TABLET, FILM COATED ORAL at 06:53

## 2024-02-29 RX ADMIN — FAMOTIDINE 20 MG: 20 TABLET, FILM COATED ORAL at 21:52

## 2024-02-29 RX ADMIN — WATER 2000 MG: 1 INJECTION INTRAMUSCULAR; INTRAVENOUS; SUBCUTANEOUS at 18:25

## 2024-02-29 RX ADMIN — FAMOTIDINE 20 MG: 20 TABLET, FILM COATED ORAL at 08:55

## 2024-02-29 RX ADMIN — WATER 2000 MG: 1 INJECTION INTRAMUSCULAR; INTRAVENOUS; SUBCUTANEOUS at 06:55

## 2024-02-29 RX ADMIN — HYDROCHLOROTHIAZIDE 12.5 MG: 25 TABLET ORAL at 08:55

## 2024-02-29 RX ADMIN — SODIUM CHLORIDE, PRESERVATIVE FREE 10 ML: 5 INJECTION INTRAVENOUS at 00:15

## 2024-02-29 RX ADMIN — ACETAMINOPHEN 650 MG: 325 TABLET ORAL at 06:53

## 2024-02-29 RX ADMIN — ACETAMINOPHEN 650 MG: 325 TABLET ORAL at 12:39

## 2024-02-29 RX ADMIN — ATORVASTATIN CALCIUM 10 MG: 10 TABLET, FILM COATED ORAL at 08:55

## 2024-02-29 RX ADMIN — ASPIRIN 81 MG: 81 TABLET, COATED ORAL at 08:55

## 2024-02-29 RX ADMIN — LISINOPRIL 10 MG: 5 TABLET ORAL at 08:55

## 2024-02-29 ASSESSMENT — PAIN DESCRIPTION - ORIENTATION
ORIENTATION: RIGHT

## 2024-02-29 ASSESSMENT — PAIN DESCRIPTION - LOCATION
LOCATION: KNEE

## 2024-02-29 ASSESSMENT — PAIN SCALES - GENERAL
PAINLEVEL_OUTOF10: 5
PAINLEVEL_OUTOF10: 2
PAINLEVEL_OUTOF10: 4
PAINLEVEL_OUTOF10: 3

## 2024-02-29 ASSESSMENT — PAIN DESCRIPTION - DESCRIPTORS
DESCRIPTORS: SORE
DESCRIPTORS: ACHING
DESCRIPTORS: SORE

## 2024-02-29 NOTE — OP NOTE
OPERATIVE REPORT     Admit Date: 2/28/2024  Admit Diagnosis: Infection of patella (HCC) [M86.9]  Septic prepatellar bursitis of right knee [M71.161]    Date of Procedure: [unfilled]   Preoperative Diagnosis: Infection of patella (HCC) [M86.9]  Postoperative Diagnosis: * No post-op diagnosis entered *    Procedure: Procedure(s):  LEG INCISION AND DRAINAGE WITH POLYEXCHANGE  Surgeon: Manuel Diamond MD  Assistant(s): Jim Monaco PA-C  Anesthesia: General   Estimated Blood Loss: 400cc  Specimens:   ID Type Source Tests Collected by Time Destination   A : right suprapatellar pouch Swab Joint, Knee CULTURE, WOUND Manuel Diamond MD 2/28/2024 1910    B : right tibial interface Swab Joint, Knee CULTURE, WOUND Manuel Diamond MD 2/28/2024 1929       Complications: None          INDICATIONS:     The patient is a 63 y.o., male who has an superficial infection. The patient underwent the appropriate preoperative labs and was indicated for surgery. Informed consent obtained including a discussion of the risks and benefits, which include, but are not limited to, bleeding, infection, neurovascular damage, wound complications, pain and stiffness in the knee, periprosthetic loosening, fracture dislocation and DVT, the patient consented for the procedure.     DESCRIPTION OF PROCEDURE:     The proper limb was identified and signed in the preoperative holding area. All questions were answered.        After adequate anesthesia, the right lower extremity was prepped and draped in sterile fashion.  The patient was positioned supine on the operating room table. Using the old incision a mid-line parapatellar incision was used and there was gross purulence superficial and deep with an open lateral arthrotomy. A medial arthrotomy was used. Excess or residual scar was excised if non-viable. There was marked purulence throughout the knee. The medial and lateral gutters were debrided of scar and tissue. Soft tissue was removed from around the

## 2024-02-29 NOTE — PROGRESS NOTES
TRANSFER - OUT REPORT:    Verbal report given to Lubna(name) kait Matos  being transferred to 4th floor; room 413(unit) for routine post-op       Report consisted of patient’s Situation, Background, Assessment and   Recommendations(SBAR).     Information from the following report(s) Nurse Handoff Report was reviewed with the receiving nurse.    Opportunity for questions and clarification was provided.      Patient transported with:   Registered Nurse

## 2024-02-29 NOTE — PROGRESS NOTES
[facility] Dosing Services: Antimicrobial Stewardship Daily Doc  Consult for antibiotic dosing of Vancomycin by Dr. Wick  Indication: Right prosthetic knee infection  Day of Therapy: 1    Ht Readings from Last 1 Encounters:   02/28/24 1.829 m (6')        Wt Readings from Last 1 Encounters:   02/28/24 104.3 kg (229 lb 15 oz)      Vancomycin therapy:  Loading dose: Vancomycin 2500 mg x1 dose now/given  Maintenance dose: Vancomycin 1250 mg IV every 12 hours   Dose calculated to approximate a           a. Target AUC/DIANA of 400-600          b. Trough of 15-20 mcg/mL   Last level: Will order a level within 24-48hrs of 1st maintenance dose  Plan: Continue same  Dose administration notes: Doses given appropriately as scheduled    Other Antimicrobial   (not dosed by pharmacist) Rocephin 1 gm IV q24hr   Cultures pending   Serum Creatinine Lab Results   Component Value Date/Time    CREATININE 1.02 02/29/2024 02:21 AM          Creatinine Clearance Estimated Creatinine Clearance: 94 mL/min (based on SCr of 1.02 mg/dL).     Temp Temp: 98.4 °F (36.9 °C) (Oral)       WBC Lab Results   Component Value Date/Time    WBC 6.5 02/25/2024 09:33 PM          Procalcitonin No results found for: \"PROCAL\"   For Antifungals, Metronidazole and Nafcillin: Lab Results   Component Value Date/Time    ALT 20 02/25/2024 09:33 PM    AST 16 02/25/2024 09:33 PM        Pharmacist  Christi Humphreys, PharmD   [phone]

## 2024-02-29 NOTE — PROGRESS NOTES
ID.  Chart reviewed.  Start vancomycin and ceftriaxone pending further culture data.  Full consult to follow

## 2024-02-29 NOTE — ANESTHESIA POSTPROCEDURE EVALUATION
Department of Anesthesiology  Postprocedure Note    Patient: Vega Matos  MRN: [id number]  YOB: 1960  Date of evaluation: 2/28/2024    Procedure Summary       Date: 02/28/24 Room / Location: [facility] MAIN OR F5 / [facility] MAIN OR    Anesthesia Start: 1839 Anesthesia Stop: 2031    Procedure: LEG INCISION AND DRAINAGE WITH POLYEXCHANGE (Right: Knee) Diagnosis:       Infection of patella (HCC)      (Infection of patella (HCC) [M86.9])    Surgeons: Manuel Diamond MD Responsible Provider: Tina Galindo MD    Anesthesia Type: general ASA Status: 2            Anesthesia Type: No value filed.    Dion Phase I: Dion Score: 8    Dion Phase II:      Anesthesia Post Evaluation    Patient location during evaluation: PACU  Patient participation: complete - patient participated  Level of consciousness: awake  Airway patency: patent  Nausea & Vomiting: no vomiting and no nausea  Cardiovascular status: hemodynamically stable  Respiratory status: acceptable  Hydration status: stable  Pain management: adequate    No notable events documented.

## 2024-02-29 NOTE — PROGRESS NOTES
TOTAL KNEE REVISION ARTHROPLASTY DAILY NOTE     ASSESSMENT / PLAN :   Pain Control : Acceptable - Some pain at times, but the patient does not wish to increase their medications  Overnight Issues : right knee pain  Wound or incisional issue : Healing incision with no visible drainage  Therapy / Weight Bearing Recommendations : Weight bear as tolerated with use of a walker and two person assist while mobilizing  DVT Prophylaxis : Mechanical and Aspirin and mechanical lower extremity compression device  Disposition : Discharge to home with home health  Medical Concerns : ID consult.  IV abx x 6  weeks.  PICC line to be placed prior to discharge  Comments : Discharge timing TBD depending on ID plan     POD  1 Day Post-Op s/p Procedure(s):  LEG INCISION AND DRAINAGE WITH POLYEXCHANGE     SUBJECTIVE :     Patient notes the following issues : right knee pain.     OBJECTIVE :     Patient Vitals for the past 24 hrs:   BP Temp Temp src Pulse Resp SpO2 Height Weight   02/29/24 1130 112/70 98.6 °F (37 °C) Oral 65 16 98 % -- --   02/29/24 0929 118/70 -- -- 69 -- 99 % -- --   02/29/24 0727 123/68 97.9 °F (36.6 °C) Oral 66 18 98 % -- --   02/29/24 0503 114/73 97.7 °F (36.5 °C) -- 71 -- 95 % -- --   02/28/24 2302 124/87 -- -- 78 -- -- -- --   02/28/24 2248 124/87 97.9 °F (36.6 °C) -- 78 -- 94 % -- --   02/28/24 2208 125/84 98.4 °F (36.9 °C) -- 80 22 90 % -- --   02/28/24 2130 127/70 -- -- 76 12 95 % -- --   02/28/24 2125 123/76 -- -- 74 11 94 % -- --   02/28/24 2120 123/75 -- -- 76 20 96 % -- --   02/28/24 2115 124/82 -- -- 73 15 99 % -- --   02/28/24 2114 -- -- -- -- 18 -- -- --   02/28/24 2110 124/70 -- -- 72 15 99 % -- --   02/28/24 2108 -- -- -- 73 -- -- -- --   02/28/24 2105 122/77 -- -- 70 11 100 % -- --   02/28/24 2100 126/73 -- -- 72 (!) 8 98 % -- --   02/28/24 2055 119/76 -- -- 72 18 100 % -- --   02/28/24 2050 131/68 -- -- 71 16 99 % -- --   02/28/24 2045 125/69 -- -- 74 11 100 % -- --   02/28/24 2040 125/60 -- -- 79

## 2024-02-29 NOTE — PROGRESS NOTES
Occupational Therapy  Order received and chart reviewed, attempted to see however patient declines need at this time, has all equipment, familiar with OT. Will sign off, RN and PT aware.    Karma James, OTR/L

## 2024-02-29 NOTE — PROGRESS NOTES
Patient discussed with GARY Lang who has evaluated patient today. Patient will need ID consult and PICC/IV antibiotics. Patient to remain on Ancef until ID recommendations are given. Updated orders accordingly. Patient to be WBAT with full knee extension/no knee flexion per Dr. Diamond. If patient unable to maintain precautions, may use T-scope brace. Will continue to follow.    MOHIT Szymanski - NP

## 2024-03-01 PROBLEM — R73.9 HYPERGLYCEMIA: Status: ACTIVE | Noted: 2024-03-01

## 2024-03-01 PROBLEM — T84.59XA INFECTED PROSTHETIC KNEE JOINT (HCC): Status: ACTIVE | Noted: 2024-03-01

## 2024-03-01 PROBLEM — Z96.659 INFECTED PROSTHETIC KNEE JOINT (HCC): Status: ACTIVE | Noted: 2024-03-01

## 2024-03-01 PROCEDURE — 2500000003 HC RX 250 WO HCPCS: Performed by: ORTHOPAEDIC SURGERY

## 2024-03-01 PROCEDURE — 6370000000 HC RX 637 (ALT 250 FOR IP): Performed by: ORTHOPAEDIC SURGERY

## 2024-03-01 PROCEDURE — 6360000002 HC RX W HCPCS: Performed by: INTERNAL MEDICINE

## 2024-03-01 PROCEDURE — 1100000000 HC RM PRIVATE

## 2024-03-01 PROCEDURE — A4216 STERILE WATER/SALINE, 10 ML: HCPCS | Performed by: ORTHOPAEDIC SURGERY

## 2024-03-01 PROCEDURE — 99222 1ST HOSP IP/OBS MODERATE 55: CPT | Performed by: INTERNAL MEDICINE

## 2024-03-01 PROCEDURE — 2580000003 HC RX 258: Performed by: ORTHOPAEDIC SURGERY

## 2024-03-01 PROCEDURE — 94761 N-INVAS EAR/PLS OXIMETRY MLT: CPT

## 2024-03-01 PROCEDURE — 97116 GAIT TRAINING THERAPY: CPT

## 2024-03-01 PROCEDURE — 2580000003 HC RX 258: Performed by: INTERNAL MEDICINE

## 2024-03-01 PROCEDURE — APPNB30 APP NON BILLABLE TIME 0-30 MINS: Performed by: NURSE PRACTITIONER

## 2024-03-01 RX ADMIN — THERA TABS 1 TABLET: TAB at 09:07

## 2024-03-01 RX ADMIN — GUAIFENESIN 600 MG: 600 TABLET ORAL at 20:59

## 2024-03-01 RX ADMIN — FAMOTIDINE 20 MG: 20 TABLET, FILM COATED ORAL at 20:59

## 2024-03-01 RX ADMIN — LISINOPRIL 10 MG: 5 TABLET ORAL at 09:07

## 2024-03-01 RX ADMIN — SODIUM CHLORIDE, PRESERVATIVE FREE 10 ML: 5 INJECTION INTRAVENOUS at 21:00

## 2024-03-01 RX ADMIN — TRAZODONE HYDROCHLORIDE 100 MG: 100 TABLET ORAL at 20:59

## 2024-03-01 RX ADMIN — ASPIRIN 81 MG: 81 TABLET, COATED ORAL at 20:59

## 2024-03-01 RX ADMIN — ASPIRIN 81 MG: 81 TABLET, COATED ORAL at 09:07

## 2024-03-01 RX ADMIN — ATORVASTATIN CALCIUM 10 MG: 10 TABLET, FILM COATED ORAL at 09:07

## 2024-03-01 RX ADMIN — SODIUM CHLORIDE, PRESERVATIVE FREE 10 ML: 5 INJECTION INTRAVENOUS at 09:08

## 2024-03-01 RX ADMIN — OXYCODONE 5 MG: 5 TABLET ORAL at 16:01

## 2024-03-01 RX ADMIN — OXYCODONE 5 MG: 5 TABLET ORAL at 09:07

## 2024-03-01 RX ADMIN — IBUPROFEN 600 MG: 600 TABLET, FILM COATED ORAL at 16:01

## 2024-03-01 RX ADMIN — HYDROCHLOROTHIAZIDE 12.5 MG: 25 TABLET ORAL at 09:07

## 2024-03-01 RX ADMIN — VANCOMYCIN HYDROCHLORIDE 1250 MG: 1.25 INJECTION, POWDER, LYOPHILIZED, FOR SOLUTION INTRAVENOUS at 05:04

## 2024-03-01 RX ADMIN — GUAIFENESIN 600 MG: 600 TABLET ORAL at 09:07

## 2024-03-01 RX ADMIN — ACETAMINOPHEN 650 MG: 325 TABLET ORAL at 05:02

## 2024-03-01 RX ADMIN — ACETAMINOPHEN 650 MG: 325 TABLET ORAL at 11:43

## 2024-03-01 RX ADMIN — FAMOTIDINE 20 MG: 10 INJECTION, SOLUTION INTRAVENOUS at 09:06

## 2024-03-01 RX ADMIN — WATER 2000 MG: 1 INJECTION INTRAMUSCULAR; INTRAVENOUS; SUBCUTANEOUS at 17:35

## 2024-03-01 RX ADMIN — VANCOMYCIN HYDROCHLORIDE 1250 MG: 1.25 INJECTION, POWDER, LYOPHILIZED, FOR SOLUTION INTRAVENOUS at 17:39

## 2024-03-01 RX ADMIN — POLYETHYLENE GLYCOL 3350 17 G: 17 POWDER, FOR SOLUTION ORAL at 09:07

## 2024-03-01 RX ADMIN — ACETAMINOPHEN 650 MG: 325 TABLET ORAL at 22:02

## 2024-03-01 RX ADMIN — SODIUM CHLORIDE: 9 INJECTION, SOLUTION INTRAVENOUS at 21:44

## 2024-03-01 RX ADMIN — ACETAMINOPHEN 650 MG: 325 TABLET ORAL at 17:35

## 2024-03-01 RX ADMIN — IBUPROFEN 600 MG: 600 TABLET, FILM COATED ORAL at 09:07

## 2024-03-01 RX ADMIN — OXYCODONE 5 MG: 5 TABLET ORAL at 05:02

## 2024-03-01 RX ADMIN — IBUPROFEN 600 MG: 600 TABLET, FILM COATED ORAL at 22:02

## 2024-03-01 ASSESSMENT — PAIN DESCRIPTION - ORIENTATION: ORIENTATION: RIGHT

## 2024-03-01 ASSESSMENT — PAIN SCALES - GENERAL
PAINLEVEL_OUTOF10: 3
PAINLEVEL_OUTOF10: 5
PAINLEVEL_OUTOF10: 4
PAINLEVEL_OUTOF10: 3
PAINLEVEL_OUTOF10: 0
PAINLEVEL_OUTOF10: 2
PAINLEVEL_OUTOF10: 4
PAINLEVEL_OUTOF10: 3

## 2024-03-01 ASSESSMENT — PAIN DESCRIPTION - LOCATION
LOCATION: KNEE
LOCATION: KNEE

## 2024-03-01 ASSESSMENT — PAIN DESCRIPTION - DESCRIPTORS: DESCRIPTORS: SHARP;POUNDING

## 2024-03-01 NOTE — CONSULTS
Infectious Disease Consult    Impression/Plan   Right knee prosthetic joint infection.  Status post I&D with poly exchange 2/28/2024.  Operative cultures are pending.  Continue vancomycin and ceftriaxone pending further culture data.  Patient will need PICC line and 6 weeks of IV antibiotics at discharge.  Lymphopenia.  Follow  Monocytosis.  Follow  Hyperglycemia.  Hemoglobin A1c 5.    Anti-infectives:   Vancomycin  Ceftriaxone    Subjective:   Date of Consultation:  March 1, 2024  Date of Admission: 2/28/2024   Referring Physician:     Patient is a 63 y.o. male with a past medical history significant for hypertension, anxiety, and dyslipidemia who is being seen for prosthetic knee infection.  Patient underwent a total knee replacement on 12/5/2022.  Postoperative course was complicated by loosening of the prosthesis requiring repair December 2023.  Patient initially did well but over the last several days prior to admission he developed pain and swelling involving the knee.  He complains of chills but no fevers.  He was seen in the emergency department and given a dose of cefazolin and discharged on cephalexin.  He was seen by Dr. Diamond in the office the following day and subsequently admitted to [facility] with concern for prepatellar bursitis.  He was taken to the OR on 2/28/2024 where he was found to have deep infection involving the prosthesis.  He underwent I&D with poly exchange.  Surgical cultures are pending.  He is currently on vancomycin and ceftriaxone.  The infectious diseases service has been asked to assist with antibiotic management      Patient Active Problem List   Diagnosis    Primary osteoarthritis of right knee    Loosening of knee joint prosthesis (HCC)    Mechanical loosening of internal right knee prosthetic joint, subsequent encounter    Septic prepatellar bursitis of right knee     Past Medical History:   Diagnosis Date    Anxiety     Arthritis     Benign microscopic

## 2024-03-01 NOTE — PROGRESS NOTES
1802: Dr. Diamond made aware that patient having chest congestion, face is flush, no fever vitals stable, receiving Aantibiotics. MD ordered mucinex

## 2024-03-01 NOTE — PROGRESS NOTES
Patient discussed with Dr. Diamond, who evaluated patient this morning. Planning to keep inpatient until cultures finalized and ID has a final plan for IV antibiotic therapy for discharge. Continue with PT while in house. Probable discharge Monday. Will continue to follow.    MOHIT Szymanski - NP

## 2024-03-02 LAB
BACTERIA SPEC CULT: ABNORMAL
BACTERIA SPEC CULT: ABNORMAL
CREAT SERPL-MCNC: 0.88 MG/DL (ref 0.7–1.3)
GRAM STN SPEC: ABNORMAL
SERVICE CMNT-IMP: ABNORMAL
VANCOMYCIN SERPL-MCNC: 15.6 UG/ML

## 2024-03-02 PROCEDURE — 6370000000 HC RX 637 (ALT 250 FOR IP): Performed by: ORTHOPAEDIC SURGERY

## 2024-03-02 PROCEDURE — 1100000000 HC RM PRIVATE

## 2024-03-02 PROCEDURE — 6360000002 HC RX W HCPCS: Performed by: INTERNAL MEDICINE

## 2024-03-02 PROCEDURE — 80202 ASSAY OF VANCOMYCIN: CPT

## 2024-03-02 PROCEDURE — 36415 COLL VENOUS BLD VENIPUNCTURE: CPT

## 2024-03-02 PROCEDURE — 2580000003 HC RX 258: Performed by: INTERNAL MEDICINE

## 2024-03-02 PROCEDURE — 2580000003 HC RX 258: Performed by: ORTHOPAEDIC SURGERY

## 2024-03-02 PROCEDURE — 82565 ASSAY OF CREATININE: CPT

## 2024-03-02 RX ADMIN — TRAZODONE HYDROCHLORIDE 100 MG: 100 TABLET ORAL at 21:02

## 2024-03-02 RX ADMIN — ASPIRIN 81 MG: 81 TABLET, COATED ORAL at 09:10

## 2024-03-02 RX ADMIN — LISINOPRIL 10 MG: 5 TABLET ORAL at 09:10

## 2024-03-02 RX ADMIN — GUAIFENESIN 600 MG: 600 TABLET ORAL at 09:10

## 2024-03-02 RX ADMIN — ACETAMINOPHEN 650 MG: 325 TABLET ORAL at 17:25

## 2024-03-02 RX ADMIN — SODIUM CHLORIDE, PRESERVATIVE FREE 10 ML: 5 INJECTION INTRAVENOUS at 09:10

## 2024-03-02 RX ADMIN — ACETAMINOPHEN 650 MG: 325 TABLET ORAL at 23:31

## 2024-03-02 RX ADMIN — THERA TABS 1 TABLET: TAB at 09:10

## 2024-03-02 RX ADMIN — ACETAMINOPHEN 650 MG: 325 TABLET ORAL at 05:30

## 2024-03-02 RX ADMIN — VANCOMYCIN HYDROCHLORIDE 1250 MG: 1.25 INJECTION, POWDER, LYOPHILIZED, FOR SOLUTION INTRAVENOUS at 18:47

## 2024-03-02 RX ADMIN — VANCOMYCIN HYDROCHLORIDE 1250 MG: 1.25 INJECTION, POWDER, LYOPHILIZED, FOR SOLUTION INTRAVENOUS at 05:38

## 2024-03-02 RX ADMIN — ASPIRIN 81 MG: 81 TABLET, COATED ORAL at 21:02

## 2024-03-02 RX ADMIN — FAMOTIDINE 20 MG: 20 TABLET, FILM COATED ORAL at 21:02

## 2024-03-02 RX ADMIN — GUAIFENESIN 600 MG: 600 TABLET ORAL at 21:02

## 2024-03-02 RX ADMIN — IBUPROFEN 600 MG: 600 TABLET, FILM COATED ORAL at 15:46

## 2024-03-02 RX ADMIN — ACETAMINOPHEN 650 MG: 325 TABLET ORAL at 11:01

## 2024-03-02 RX ADMIN — HYDROCHLOROTHIAZIDE 12.5 MG: 25 TABLET ORAL at 09:10

## 2024-03-02 RX ADMIN — IBUPROFEN 600 MG: 600 TABLET, FILM COATED ORAL at 06:39

## 2024-03-02 RX ADMIN — POLYETHYLENE GLYCOL 3350 17 G: 17 POWDER, FOR SOLUTION ORAL at 09:10

## 2024-03-02 RX ADMIN — WATER 2000 MG: 1 INJECTION INTRAMUSCULAR; INTRAVENOUS; SUBCUTANEOUS at 17:25

## 2024-03-02 RX ADMIN — FAMOTIDINE 20 MG: 20 TABLET, FILM COATED ORAL at 09:10

## 2024-03-02 RX ADMIN — ATORVASTATIN CALCIUM 10 MG: 10 TABLET, FILM COATED ORAL at 09:10

## 2024-03-02 ASSESSMENT — PAIN SCALES - GENERAL
PAINLEVEL_OUTOF10: 2
PAINLEVEL_OUTOF10: 2

## 2024-03-02 ASSESSMENT — PAIN DESCRIPTION - LOCATION: LOCATION: KNEE

## 2024-03-02 ASSESSMENT — PAIN DESCRIPTION - ORIENTATION: ORIENTATION: RIGHT

## 2024-03-02 NOTE — PROGRESS NOTES
TOTAL KNEE REVISION ARTHROPLASTY DAILY NOTE     ASSESSMENT / PLAN :   Pain Control : Excellent - Able to sleep and participate with therapy  Overnight Issues : none  Wound or incisional issue : Healing incision with no visible drainage  Therapy / Weight Bearing Recommendations : Weight bear as tolerated with use of a walker and two person assist while mobilizing, may begin ROM of the knee.   DVT Prophylaxis : Mechanical and Aspirin and mechanical lower extremity compression device  Disposition : Discharge to home with home health (maximum of 4 visits)  Medical Concerns : stable and doing well.   Comments : home on Monday, PICC line on Monday.      POD  3 Days Post-Op s/p Procedure(s):  LEG INCISION AND DRAINAGE WITH POLYEXCHANGE     SUBJECTIVE :     Patient notes the following issues : none.     OBJECTIVE :     Patient Vitals for the past 24 hrs:   BP Temp Temp src Pulse Resp SpO2   03/02/24 0001 102/75 97.7 °F (36.5 °C) Oral 65 18 --   03/01/24 1948 139/77 98.4 °F (36.9 °C) Oral 70 17 96 %   03/01/24 1601 -- -- -- -- 16 --   03/01/24 1528 137/81 98.2 °F (36.8 °C) Oral 73 16 99 %            Alert and oriented x3.    Examination of the right knee reveals that the dressing is clean, dry and intact.   Motor Exam is intact and normal. Pt is able to perform a straight leg raise.   Sensation is intact to light touch.    No calf pain.     Labs:  Recent Labs     02/29/24  0221   HGB 12.6   HCT 36.4*   *   K 4.6      CO2 27   BUN 26*          Patient mobility           MICROBIOLOGY :   Results       Procedure Component Value Units Date/Time    Culture, Wound [[id number]] Collected: 02/28/24 1910    Order Status: Completed Specimen: Knee Updated: 02/29/24 1358     Special Requests NOT PROVIDED        Gram stain 1+ WBCS SEEN         NO ORGANISMS SEEN        Culture       No growth thus far, holding 14 days.          Culture, Wound [[id number]]  (Abnormal)  (Susceptibility) Collected: 02/28/24 1910    Order

## 2024-03-02 NOTE — PROGRESS NOTES
[facility] Dosing Services: Antimicrobial Stewardship Daily Doc  Consult for antibiotic dosing of Vancomycin by Dr. Wick  Indication: Right knee prosthetic joint infection. Status post I&D with poly exchange 2/28/2024   Day of Therapy: 3 (6 weeks per ID)    Ht Readings from Last 1 Encounters:   02/28/24 1.829 m (6')        Wt Readings from Last 1 Encounters:   02/28/24 104.3 kg (229 lb 15 oz)      Vancomycin therapy:  Loading dose: Vancomycin 2500 mg x1 dose now/given  Maintenance dose: Vancomycin 1250 mg IV q12hr   Dose calculated to approximate a           a. Target AUC/DIANA of 400-600          b. Trough of 15-20 mcg/mL   Last level: 15.6 mcg/ml this am, predicts AUC ~484 which is therapeutic  Plan: Continue same.   Dose administration notes: Doses given appropriately as scheduled    Other Antimicrobial   (not dosed by pharmacist) Rocephin 2 gm IV q24hr   Cultures 2/28: Tibia: Staph/GPR pending  2/28: Knee: pending   Serum Creatinine Lab Results   Component Value Date/Time    CREATININE 0.88 03/02/2024 02:21 AM          Creatinine Clearance Estimated Creatinine Clearance: 109 mL/min (based on SCr of 0.88 mg/dL).     Temp Temp: 97.7 °F (36.5 °C) (Oral)       WBC Lab Results   Component Value Date/Time    WBC 6.5 02/25/2024 09:33 PM          Procalcitonin No results found for: \"PROCAL\"   For Antifungals, Metronidazole and Nafcillin: Lab Results   Component Value Date/Time    ALT 20 02/25/2024 09:33 PM    AST 16 02/25/2024 09:33 PM        Pharmacist  Christi Humphreys, PharmD   [phone]

## 2024-03-02 NOTE — PROGRESS NOTES
TOTAL KNEE REVISION ARTHROPLASTY DAILY NOTE     ASSESSMENT / PLAN :   Pain Control : Acceptable - Some pain at times, but the patient does not wish to increase their medications  Overnight Issues : none  Wound or incisional issue : Healing incision with no visible drainage  Therapy / Weight Bearing Recommendations : Weight bear as tolerated with use of a walker and two person assist while mobilizing  DVT Prophylaxis : Mechanical and Aspirin and mechanical lower extremity compression device  Disposition : Discharge to home with home health (maximum of 4 visits)  Medical Concerns : Stable  Comments : PICC line on Monday, mobilize with therapy, limit knee ROM for incision healing.      POD  2 Days Post-Op s/p Procedure(s):  LEG INCISION AND DRAINAGE WITH POLYEXCHANGE     SUBJECTIVE :     Patient notes the following issues : none.     OBJECTIVE :     Patient Vitals for the past 24 hrs:   BP Temp Temp src Pulse Resp SpO2   03/01/24 1601 -- -- -- -- 16 --   03/01/24 1528 137/81 98.2 °F (36.8 °C) Oral 73 16 99 %   03/01/24 1038 117/73 98.8 °F (37.1 °C) Oral 81 16 96 %   03/01/24 0937 -- -- -- -- 16 --   03/01/24 0907 -- -- -- -- 16 --   03/01/24 0759 100/67 100 °F (37.8 °C) Oral 74 16 98 %   03/01/24 0450 111/75 98.6 °F (37 °C) -- 73 -- 99 %   02/29/24 1952 104/70 99 °F (37.2 °C) -- 78 17 98 %            Alert and oriented x3.    Examination of the Right knee reveals that the dressing is clean, dry and intact.   Motor Exam is intact and normal. Pt is able to perform a straight leg raise.   Sensation is intact to light touch.    No calf pain.     Labs:  Recent Labs     02/29/24  0221   HGB 12.6   HCT 36.4*   *   K 4.6      CO2 27   BUN 26*          Patient mobility           MICROBIOLOGY :   Results       Procedure Component Value Units Date/Time    Culture, Wound [[id number]] Collected: 02/28/24 1910    Order Status: Completed Specimen: Knee Updated: 02/29/24 1358     Special Requests NOT PROVIDED

## 2024-03-03 PROCEDURE — 6370000000 HC RX 637 (ALT 250 FOR IP): Performed by: ORTHOPAEDIC SURGERY

## 2024-03-03 PROCEDURE — 2580000003 HC RX 258: Performed by: INTERNAL MEDICINE

## 2024-03-03 PROCEDURE — 6360000002 HC RX W HCPCS: Performed by: INTERNAL MEDICINE

## 2024-03-03 PROCEDURE — 94761 N-INVAS EAR/PLS OXIMETRY MLT: CPT

## 2024-03-03 PROCEDURE — 2700000000 HC OXYGEN THERAPY PER DAY

## 2024-03-03 PROCEDURE — 1100000000 HC RM PRIVATE

## 2024-03-03 PROCEDURE — 2580000003 HC RX 258: Performed by: ORTHOPAEDIC SURGERY

## 2024-03-03 RX ADMIN — SODIUM CHLORIDE, PRESERVATIVE FREE 10 ML: 5 INJECTION INTRAVENOUS at 20:08

## 2024-03-03 RX ADMIN — POLYETHYLENE GLYCOL 3350 17 G: 17 POWDER, FOR SOLUTION ORAL at 09:16

## 2024-03-03 RX ADMIN — ACETAMINOPHEN 650 MG: 325 TABLET ORAL at 05:47

## 2024-03-03 RX ADMIN — LISINOPRIL 10 MG: 5 TABLET ORAL at 09:15

## 2024-03-03 RX ADMIN — FAMOTIDINE 20 MG: 20 TABLET, FILM COATED ORAL at 20:07

## 2024-03-03 RX ADMIN — HYDROCHLOROTHIAZIDE 12.5 MG: 25 TABLET ORAL at 09:15

## 2024-03-03 RX ADMIN — TRAZODONE HYDROCHLORIDE 100 MG: 100 TABLET ORAL at 20:08

## 2024-03-03 RX ADMIN — WATER 2000 MG: 1 INJECTION INTRAMUSCULAR; INTRAVENOUS; SUBCUTANEOUS at 17:04

## 2024-03-03 RX ADMIN — VANCOMYCIN HYDROCHLORIDE 1250 MG: 1.25 INJECTION, POWDER, LYOPHILIZED, FOR SOLUTION INTRAVENOUS at 05:47

## 2024-03-03 RX ADMIN — ACETAMINOPHEN 650 MG: 325 TABLET ORAL at 17:15

## 2024-03-03 RX ADMIN — ASPIRIN 81 MG: 81 TABLET, COATED ORAL at 09:15

## 2024-03-03 RX ADMIN — THERA TABS 1 TABLET: TAB at 09:14

## 2024-03-03 RX ADMIN — ASPIRIN 81 MG: 81 TABLET, COATED ORAL at 20:08

## 2024-03-03 RX ADMIN — ATORVASTATIN CALCIUM 10 MG: 10 TABLET, FILM COATED ORAL at 09:14

## 2024-03-03 RX ADMIN — GUAIFENESIN 600 MG: 600 TABLET ORAL at 20:07

## 2024-03-03 RX ADMIN — FAMOTIDINE 20 MG: 20 TABLET, FILM COATED ORAL at 09:14

## 2024-03-03 RX ADMIN — ACETAMINOPHEN 650 MG: 325 TABLET ORAL at 11:49

## 2024-03-03 RX ADMIN — OXYCODONE 5 MG: 5 TABLET ORAL at 18:44

## 2024-03-03 RX ADMIN — GUAIFENESIN 600 MG: 600 TABLET ORAL at 09:15

## 2024-03-03 ASSESSMENT — PAIN SCALES - GENERAL
PAINLEVEL_OUTOF10: 0
PAINLEVEL_OUTOF10: 1
PAINLEVEL_OUTOF10: 2
PAINLEVEL_OUTOF10: 7
PAINLEVEL_OUTOF10: 1

## 2024-03-03 ASSESSMENT — PAIN DESCRIPTION - ORIENTATION: ORIENTATION: RIGHT

## 2024-03-03 ASSESSMENT — PAIN DESCRIPTION - LOCATION: LOCATION: KNEE

## 2024-03-03 ASSESSMENT — PAIN DESCRIPTION - DESCRIPTORS: DESCRIPTORS: DULL

## 2024-03-04 ENCOUNTER — HOME HEALTH ADMISSION (OUTPATIENT)
Dept: HOME HEALTH SERVICES | Facility: HOME HEALTH | Age: 64
End: 2024-03-04
Payer: COMMERCIAL

## 2024-03-04 ENCOUNTER — APPOINTMENT (OUTPATIENT)
Facility: HOSPITAL | Age: 64
DRG: 487 | End: 2024-03-04
Attending: INTERNAL MEDICINE
Payer: COMMERCIAL

## 2024-03-04 VITALS
RESPIRATION RATE: 17 BRPM | HEIGHT: 72 IN | HEART RATE: 82 BPM | WEIGHT: 229.94 LBS | TEMPERATURE: 98.6 F | SYSTOLIC BLOOD PRESSURE: 129 MMHG | OXYGEN SATURATION: 100 % | DIASTOLIC BLOOD PRESSURE: 81 MMHG | BODY MASS INDEX: 31.14 KG/M2

## 2024-03-04 LAB — CREAT SERPL-MCNC: 0.84 MG/DL (ref 0.7–1.3)

## 2024-03-04 PROCEDURE — 6360000002 HC RX W HCPCS: Performed by: INTERNAL MEDICINE

## 2024-03-04 PROCEDURE — 6370000000 HC RX 637 (ALT 250 FOR IP): Performed by: INTERNAL MEDICINE

## 2024-03-04 PROCEDURE — 2700000000 HC OXYGEN THERAPY PER DAY

## 2024-03-04 PROCEDURE — 2580000003 HC RX 258: Performed by: INTERNAL MEDICINE

## 2024-03-04 PROCEDURE — 02HV33Z INSERTION OF INFUSION DEVICE INTO SUPERIOR VENA CAVA, PERCUTANEOUS APPROACH: ICD-10-PCS | Performed by: ORTHOPAEDIC SURGERY

## 2024-03-04 PROCEDURE — 6370000000 HC RX 637 (ALT 250 FOR IP): Performed by: ORTHOPAEDIC SURGERY

## 2024-03-04 PROCEDURE — A4216 STERILE WATER/SALINE, 10 ML: HCPCS | Performed by: INTERNAL MEDICINE

## 2024-03-04 PROCEDURE — 2580000003 HC RX 258: Performed by: ORTHOPAEDIC SURGERY

## 2024-03-04 PROCEDURE — 94761 N-INVAS EAR/PLS OXIMETRY MLT: CPT

## 2024-03-04 PROCEDURE — 36415 COLL VENOUS BLD VENIPUNCTURE: CPT

## 2024-03-04 PROCEDURE — 36573 INSJ PICC RS&I 5 YR+: CPT

## 2024-03-04 PROCEDURE — 82565 ASSAY OF CREATININE: CPT

## 2024-03-04 PROCEDURE — 99233 SBSQ HOSP IP/OBS HIGH 50: CPT | Performed by: INTERNAL MEDICINE

## 2024-03-04 RX ORDER — RIFAMPIN 300 MG/1
300 CAPSULE ORAL EVERY 12 HOURS SCHEDULED
Status: DISCONTINUED | OUTPATIENT
Start: 2024-03-04 | End: 2024-03-04 | Stop reason: HOSPADM

## 2024-03-04 RX ORDER — HYDROMORPHONE HYDROCHLORIDE 2 MG/1
2 TABLET ORAL EVERY 4 HOURS PRN
Qty: 42 TABLET | Refills: 0 | Status: SHIPPED | OUTPATIENT
Start: 2024-03-04 | End: 2024-03-11

## 2024-03-04 RX ORDER — RIFAMPIN 300 MG/1
300 CAPSULE ORAL 2 TIMES DAILY
Qty: 60 CAPSULE | Refills: 5 | Status: SHIPPED | OUTPATIENT
Start: 2024-03-04 | End: 2024-04-03

## 2024-03-04 RX ORDER — ASPIRIN 81 MG/1
81 TABLET ORAL 2 TIMES DAILY
Qty: 60 TABLET | Refills: 0 | Status: SHIPPED | OUTPATIENT
Start: 2024-03-04

## 2024-03-04 RX ORDER — DOXYCYCLINE HYCLATE 100 MG
100 TABLET ORAL 2 TIMES DAILY
Qty: 60 TABLET | Refills: 4 | Status: SHIPPED | OUTPATIENT
Start: 2024-03-04 | End: 2024-04-03

## 2024-03-04 RX ADMIN — ACETAMINOPHEN 650 MG: 325 TABLET ORAL at 05:05

## 2024-03-04 RX ADMIN — WATER 2000 MG: 1 INJECTION INTRAMUSCULAR; INTRAVENOUS; SUBCUTANEOUS at 08:26

## 2024-03-04 RX ADMIN — FAMOTIDINE 20 MG: 20 TABLET, FILM COATED ORAL at 08:26

## 2024-03-04 RX ADMIN — POLYETHYLENE GLYCOL 3350 17 G: 17 POWDER, FOR SOLUTION ORAL at 08:23

## 2024-03-04 RX ADMIN — ACETAMINOPHEN 650 MG: 325 TABLET ORAL at 00:28

## 2024-03-04 RX ADMIN — OXYCODONE 5 MG: 5 TABLET ORAL at 08:25

## 2024-03-04 RX ADMIN — GUAIFENESIN 600 MG: 600 TABLET ORAL at 08:25

## 2024-03-04 RX ADMIN — WATER 2000 MG: 1 INJECTION INTRAMUSCULAR; INTRAVENOUS; SUBCUTANEOUS at 00:30

## 2024-03-04 RX ADMIN — ATORVASTATIN CALCIUM 10 MG: 10 TABLET, FILM COATED ORAL at 08:25

## 2024-03-04 RX ADMIN — SODIUM CHLORIDE 700 MG: 9 INJECTION INTRAMUSCULAR; INTRAVENOUS; SUBCUTANEOUS at 14:24

## 2024-03-04 RX ADMIN — HYDROCHLOROTHIAZIDE 12.5 MG: 25 TABLET ORAL at 08:24

## 2024-03-04 RX ADMIN — THERA TABS 1 TABLET: TAB at 08:26

## 2024-03-04 RX ADMIN — ASPIRIN 81 MG: 81 TABLET, COATED ORAL at 08:25

## 2024-03-04 RX ADMIN — SODIUM CHLORIDE, PRESERVATIVE FREE 10 ML: 5 INJECTION INTRAVENOUS at 08:26

## 2024-03-04 RX ADMIN — LISINOPRIL 10 MG: 5 TABLET ORAL at 08:25

## 2024-03-04 RX ADMIN — RIFAMPIN 300 MG: 300 CAPSULE ORAL at 10:20

## 2024-03-04 RX ADMIN — ACETAMINOPHEN 650 MG: 325 TABLET ORAL at 13:10

## 2024-03-04 ASSESSMENT — PAIN SCALES - GENERAL
PAINLEVEL_OUTOF10: 5
PAINLEVEL_OUTOF10: 5

## 2024-03-04 NOTE — PROGRESS NOTES
Post Discharge PICC and Antibiotic Orders    1.  Diagnosis: Prosthetic knee infection.  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  Possible GPR  2.  Antibiotic: Daptomycin 700mg IV daily through 4/14/2024                        Rifampin 300 mg p.o. twice daily through 4/14/2024                        Hold statin while on daptomycin  3.  Routine PICC/ Danie/ Portacath Care including PRN catheter flow management  4.  Weekly labs:   [x] CBC/diff/platelets   [x] BUN/Creatinine   [x] CPK   [x] AST/TotalBilirubin/AlkalinePhosphatase   [x] CRP   []Trough Vancomycin level goal 15-20  5.  Fax lab to [phone]  Call Critical Lab Values to [phone]  6.  May send to IR for line evaluation or replacement [phone]  7.  Home Health to pull PIC line at end of therapy or send to IR for Danie removal  8.  Allergies:  No Known Allergies      Salvador Wick DO

## 2024-03-04 NOTE — PROGRESS NOTES
[facility] Progress Note  Salvador Wick DO  [phone] Office  [phone] Fax    3/4/2024      Assessment & Plan:     Right knee prosthetic joint infection.  Status post I&D with poly exchange 2024.  Surgical cultures growing coagulase-negative Staphylococcus..  Gram-positive rods seen on Gram stain which likely represents diphtheroids.  Will discuss further with micro lab.  Second surgical culture is being held for 14 days and shows no growth to date.  Plan discharge on daptomycin and rifampin.  May adjust antibiotics based on final culture results.  PICC line ordered and IV antibiotic orders are in the chart.  Prescription for rifampin sent to pharmacy.  First dose of daptomycin ordered to be given today prior to discharge.  Patient instructed to hold statin while on daptomycin.  Discussed potential side effects of rifampin with patient.  Okay for discharge from my standpoint.  Patient follow-up with me in the office in 6 weeks.  Anticipate total 6 months of antibiotic therapy which may be completed with oral antibiotics once the initial 6 weeks of cefazolin had been completed  Lymphopenia.  Follow  Monocytosis.  Follow  Hyperglycemia.  Hemoglobin A1c 5.          Subjective:     No complaints.  Anxious for discharge    Objective:     Vitals: /81   Pulse 82   Temp 98.6 °F (37 °C)   Resp 17   Ht 1.829 m (6')   Wt 104.3 kg (229 lb 15 oz)   SpO2 100%   BMI 31.19 kg/m²      Tmax:  Temp (24hrs), Av.1 °F (36.7 °C), Min:97.3 °F (36.3 °C), Max:98.6 °F (37 °C)      Exam:   Patient is intubated:  no    Physical Examination:   General:  Alert, cooperative, no distress   Head:  Normocephalic, atraumatic.   Eyes:  Conjunctivae clear   Neck: Supple       Lungs:   No distress.     Chest wall:     Heart:     Abdomen:    non-distended   Extremities: Moves all.  Knee bandaged   Skin: No acute rash on exposed skin   Neurologic: CNII-XII intact. Normal strength     Labs:

## 2024-03-04 NOTE — PROGRESS NOTES
PICC Placementyes Note    PRE-PROCEDURE VERIFICATION  Correct Procedure: yes  Correct Site:  yes  Temperature: Temp: 98.6 °F (37 °C)      No results for input(s): \"BUN\", \"CREA\", \"PLT\", \"INR\", \"WBC\" in the last 72 hours.    Invalid input(s): \"APTHR\"   Allergies: Patient has no known allergies.   Education materials for PICC Care given: yes. See Patient Education activity for further details.  PICC Booklet placed at bedside: yes    Closed Ended PICC Catheters:  Flush Lumens as Follows:  Intermittent Medication:   Flush before and after each medication with 10 ml NS.   Unused Ports:  Flush every 8 hours with 10 ml NS.  TPN Ports:  Flush every 24 hours with 20 ml NS prior to hanging new bag.  Blood Draws: Stop infusion, draw off and waste 10 ml of blood. Draw sample with 10cc syringe or greater. DO NOT USE VACUTAINER . Transfer with appropriate device to lab  tubes. Flush with 20 ml NS.  Dressing Change:  Every 7 days, and PRN using sterile technique if integrity of dressing is compromised.  Initial dressing change for central line 24-48 hours post insertion if gauze is used. Apply new dressing per policy.    PROCEDURE DETAIL  Consent was obtained and all questions were answered related to risks and benefits.   A single lumen PICC line was inserted, as a sterile procedure using ultrasound and modified Seldinger technique for antibiotic therapy. The following documentation is in addition to the PICC properties in the lines/airways flowsheet :  Lot #: [id number]  Lidocaine 1% administered intradermally :yes  Internal Catheter Total Length: 43 (cm)  Vein Selection for PICC: right basilic  Central Line Bundle followed yes  Complication Related to Insertion:no  Right arm circumference 35 (Cm) @ insertion site  The placement was verified by EKG, MAX P WAVE @ 43 (cm) with zero (cm) out PER EKG PICC TIP @ C/A junction.      X-ray: no.   Line is okay to use: yes    KEYLA MCPHERSON RN

## 2024-03-04 NOTE — PROGRESS NOTES
TOTAL KNEE REVISION ARTHROPLASTY DAILY NOTE     ASSESSMENT / PLAN :   Pain Control : Acceptable - Some pain at times, but the patient does not wish to increase their medications  Overnight Issues : none  Wound or incisional issue : Healing incision with no visible drainage  Therapy / Weight Bearing Recommendations : Weight bear as tolerated with use of a walker and two person assist while mobilizing  DVT Prophylaxis : Mechanical and Aspirin and mechanical lower extremity compression device  Disposition : Outpatient PT  Medical Concerns : Periprosthetic left knee infection - 6 weeks IV abx per Dr. Wick (daptomycin/rifampin)  Comments : Discharge home once plan for IV abx is set and PICC line in place.  Patient needs PICC education prior to leaving.      POD  5 Days Post-Op s/p Procedure(s):  LEG INCISION AND DRAINAGE WITH POLYEXCHANGE     SUBJECTIVE :     Patient notes the following issues : none.     OBJECTIVE :     Patient Vitals for the past 24 hrs:   BP Temp Temp src Pulse Resp SpO2   03/04/24 1028 129/81 98.6 °F (37 °C) -- 82 -- 100 %   03/04/24 0715 122/78 98 °F (36.7 °C) Oral 81 17 99 %   03/04/24 0330 109/72 97.7 °F (36.5 °C) Oral 66 18 96 %   03/03/24 2324 107/74 97.3 °F (36.3 °C) Oral 71 18 99 %   03/03/24 2016 129/86 98.4 °F (36.9 °C) Oral 75 16 96 %   03/03/24 1930 -- -- -- 75 -- --   03/03/24 1545 (!) 141/101 98.3 °F (36.8 °C) Oral 78 18 98 %   03/03/24 1145 (!) 141/83 98.4 °F (36.9 °C) Oral 77 20 98 %            Alert and oriented x3.    Examination of the physical knee reveals that the dressing is clean, dry and intact.   Motor Exam is intact and normal. Pt is able to perform a straight leg raise.   Sensation is intact to light touch.    No calf pain.     Labs:  No results for input(s): \"HGB\", \"HCT\", \"INR\", \"NA\", \"K\", \"CL\", \"CO2\", \"BUN\", \"CREA\", \"GLU\" in the last 72 hours.       Patient mobility           MICROBIOLOGY :   Results       Procedure Component Value Units Date/Time    Culture, Wound

## 2024-03-04 NOTE — PLAN OF CARE
Problem: ABCDS Injury Assessment  Goal: Absence of physical injury  3/4/2024 1436 by Kvng Aviles RN  Outcome: Adequate for Discharge  3/4/2024 0221 by Tab Villarreal RN  Outcome: Progressing     Problem: Pain  Goal: Verbalizes/displays adequate comfort level or baseline comfort level  3/4/2024 1436 by Kvng Aviles RN  Outcome: Adequate for Discharge  3/4/2024 0221 by Tab Villarreal RN  Outcome: Progressing     Problem: Discharge Planning  Goal: Discharge to home or other facility with appropriate resources  3/4/2024 1436 by Kvng Aviles RN  Outcome: Adequate for Discharge  3/4/2024 0221 by Tab Villarreal RN  Outcome: Progressing     Problem: Skin/Tissue Integrity  Goal: Absence of new skin breakdown  Description: 1.  Monitor for areas of redness and/or skin breakdown  2.  Assess vascular access sites hourly  3.  Every 4-6 hours minimum:  Change oxygen saturation probe site  4.  Every 4-6 hours:  If on nasal continuous positive airway pressure, respiratory therapy assess nares and determine need for appliance change or resting period.  3/4/2024 1436 by Kvng Aviles RN  Outcome: Adequate for Discharge  3/4/2024 0221 by Tab Villarreal RN  Outcome: Progressing     Problem: Safety - Adult  Goal: Free from fall injury  3/4/2024 1436 by Kvng Aviles RN  Outcome: Adequate for Discharge  3/4/2024 0221 by Tab Villarreal RN  Outcome: Progressing     
  Problem: ABCDS Injury Assessment  Goal: Absence of physical injury  Outcome: Progressing     Problem: Pain  Goal: Verbalizes/displays adequate comfort level or baseline comfort level  Outcome: Progressing     Problem: Discharge Planning  Goal: Discharge to home or other facility with appropriate resources  Outcome: Progressing     Problem: Skin/Tissue Integrity  Goal: Absence of new skin breakdown  Description: 1.  Monitor for areas of redness and/or skin breakdown  2.  Assess vascular access sites hourly  3.  Every 4-6 hours minimum:  Change oxygen saturation probe site  4.  Every 4-6 hours:  If on nasal continuous positive airway pressure, respiratory therapy assess nares and determine need for appliance change or resting period.  Outcome: Progressing     Problem: Safety - Adult  Goal: Free from fall injury  Outcome: Progressing     
  Problem: Physical Therapy - Adult  Goal: By Discharge: Performs mobility at highest level of function for planned discharge setting.  See evaluation for individualized goals.  Description: FUNCTIONAL STATUS PRIOR TO ADMISSION: Patient was independent and active without use of DME.    HOME SUPPORT PRIOR TO ADMISSION: The patient lived with his spouse but did not require assistance.    Physical Therapy Goals  Initiated 2/29/2024  1.  Patient will move from supine to sit and sit to supine in bed with modified independence within 7 day(s).    2.  Patient will perform sit to stand with modified independence within 7 day(s).  3.  Patient will transfer from bed to chair and chair to bed with modified independence using the least restrictive device within 7 day(s).  4.  Patient will ambulate with modified independence for 300 feet with the least restrictive device within 7 day(s).   5.  Patient will ascend/descend 4 stairs with 1 handrail(s) with modified independence within 7 day(s).      Outcome: Adequate for Discharge   PHYSICAL THERAPY TREATMENT/DISCHARGE    Patient: Vega Matos (63 y.o. male)  Date: 3/1/2024  Diagnosis: Infection of patella (HCC) [M86.9]  Septic prepatellar bursitis of right knee [M71.161] Septic prepatellar bursitis of right knee  Procedure(s) (LRB):  LEG INCISION AND DRAINAGE WITH POLYEXCHANGE (Right) 2 Days Post-Op  Precautions:  (avoid flexion until cleared by MD) Right Lower Extremity Weight Bearing: Weight Bearing As Tolerated                    ASSESSMENT:  Patient has been followed by skilled PT services and has progressed towards goals. He is knowledgeable regarding post-op mobility, is moving with good quality, and safely. Gait training completed x600' using a RW modified independently without difficulty. Assist only required for IV pole management and occasional cues for posture. Discussed stair for home with no concerns expressed. Provided a post-op HEP to initiate once cleared for 
                                                                                                                                                                                                     [facility] AM-PAC®      Basic Mobility Inpatient Short Form (6-Clicks) Version 2  How much HELP from another person do you currently need... (If the patient hasn't done an activity recently, how much help from another person do you think they would need if they tried?) Total A Lot A Little None   1.  Turning from your back to your side while in a flat bed without using bedrails? []  1 []  2 []  3  [x]  4   2.  Moving from lying on your back to sitting on the side of a flat bed without using bedrails? []  1 []  2 []  3  [x]  4   3.  Moving to and from a bed to a chair (including a wheelchair)? []  1 []  2 []  3  [x]  4   4. Standing up from a chair using your arms (e.g. wheelchair or bedside chair)? []  1 []  2 [x]  3  []  4   5.  Walking in hospital room? []  1 []  2 [x]  3  []  4   6.  Climbing 3-5 steps with a railing? []  1 []  2 [x]  3  []  4     Raw Score: 21/24                            Cutoff score ?171,2,3 had higher odds of discharging home with home health or need of SNF/IPR.    1. Renay John, Zara Bell, Jian Davis, Aliya Vázquez, Nino Ignacio, Venkata John.  Validity of the AM-PAC “6-Clicks” Inpatient Daily Activity and Basic Mobility Short Forms. Physical Therapy Mar 2014, 94 [phone]) 379-391; DOI: 10.2522/ptj.25569303  2. Kameron TYSON, Chidi LAGOS, Oxana LAGOS, Castro LAGOS. Association of AM-PAC \"6-Clicks\" Basic Mobility and Daily Activity Scores With Discharge Destination. Phys Ther. 2021 Apr 4;101(4):cket433. doi: 10.1093/[email or web address]/xuax232. PMID: 32649237.  3. Xenia LAGOS, Mulugeta DAVENPORT, Gypsy S, Lynsey K, Jemima S. Activity Measure for Post-Acute Care \"6-Clicks\" Basic Mobility Scores Predict Discharge Destination After Acute Care Hospitalization in Select Patient Groups: A Retrospective,

## 2024-03-04 NOTE — CARE COORDINATION
1500 Updates:    CM confirmed with ORTHO NP that patient may DC on IV abx pending blood cultures. If DC on IV abx, will need a PICC line.  CM confirmed with the patient that him and spouse are teachable.  Will continue to follow.         Initial Case Management Assessment    Discharge Plan:  Anticipate DC to home with continued family support.  Patient has been cleared by PT/OT.      --Patient mentioned that he was informed by an MD that he maybe DC on IV abx and that he needs a PICC line.  CM informed the patient that the ORTHO team is rounding right now and that they will be in to inform him of clinical updates.  CM informed Ortho NP of updates.     Pt was admitted on 2/28/24 for s/p I&D RLE. CM met with Pt to complete initial assessment. CM introduced self, CM role, and verified demographics.     Pt has no hx of HH, home O2, or equipment. Pt is independent with ADLs and ambulation. Denies problems with either.     Pharmacy: [place] vaccination(s): Yes  AMD: Yes on file.        02/29/24 1153   Service Assessment   Patient Orientation Alert and Oriented   History Provided By Patient   Primary Caregiver Self   Support Systems Spouse/Significant Other   Patient's Healthcare Decision Maker is: Named in Scanned ACP Document   PCP Verified by CM Yes  (Marcos SHAIKH, Raza [phone])   Last Visit to PCP Within last 6 months   Prior Functional Level Independent in ADLs/IADLs   Current Functional Level Independent in ADLs/IADLs   Can patient return to prior living arrangement Yes   Ability to make needs known: Good   Family able to assist with home care needs: Yes   Would you like for me to discuss the discharge plan with any other family members/significant others, and if so, who? Yes  (Cristiana Mtaos (spouse) [phone])   Financial Resources   ([employer])   Community Resources None   CM/SW Referral Other (see comment)  (Discharge Planning)   Social/Functional History   Lives With Spouse   Type of Home 
  Case Management Progress Note    Discharge Plan: Anticipate DC to home on IV abx.  Pending IV abx recommendation (pending cultures).  Pending PICC line.  Plan for DC is Monday per IDR.      --Referral sent to [facility] for review.  CM confirmed with [facility] that patient may have a co-pay for his IV abx (pt has [employer] insurance).      --CM met with the patient at the bedside and discussed DCP updates.  Patient is aware that he may have a co-pay pending final IV abx recs.  Patient is agreeable to  Nursing for weekly lab draws and PICC line dressing changes.  Patient stated that he does not have a HH Agency preference, as long as they participate with his insurance.  Referral sent.      --However, CM also confirmed with the patient that he is okay get his labs drawn and PICC line dressing changes at [facility] office (free of charge), if we cannot secure a HH agency.      Will continue to follow.         03/01/24 2249   Discharge Planning   Patient expects to be discharged to: House   Services At/After Discharge   Transition of Care Consult (CM Consult) Home Health   Services At/After Discharge Home [facility]    Resource Information Provided? No   Mode of Transport at Discharge Self   Confirm Follow Up Transport Family   Condition of Participation: Discharge Planning   The Plan for Transition of Care is related to the following treatment goals: [facility] Nurse for IV abx   The Patient and/or Patient Representative was provided with a Choice of Provider? Patient   The Patient and/Or Patient Representative agree with the Discharge Plan? Yes   Freedom of Choice list was provided with basic dialogue that supports the patient's individualized plan of care/goals, treatment preferences, and shares the quality data associated with the providers?  Yes       ______________________________________  ANGELI Maradiaga, RN-CM  [facility]- Care Management  Available via 
Patient and/Or Patient Representative agree with the Discharge Plan? Yes   Freedom of Choice list was provided with basic dialogue that supports the patient's individualized plan of care/goals, treatment preferences, and shares the quality data associated with the providers?  Yes     ______________________________________  ANGELI Maradiaga, RN-CM  [facility]- Care Management  Available via [employer]  3/4/2024  12:11 PM

## 2024-03-05 ENCOUNTER — HOME CARE VISIT (OUTPATIENT)
Facility: HOME HEALTH | Age: 64
End: 2024-03-05
Payer: COMMERCIAL

## 2024-03-05 PROCEDURE — G0299 HHS/HOSPICE OF RN EA 15 MIN: HCPCS

## 2024-03-06 VITALS
TEMPERATURE: 99.3 F | HEART RATE: 83 BPM | OXYGEN SATURATION: 98 % | RESPIRATION RATE: 16 BRPM | SYSTOLIC BLOOD PRESSURE: 122 MMHG | BODY MASS INDEX: 31.15 KG/M2 | WEIGHT: 230 LBS | HEIGHT: 72 IN | DIASTOLIC BLOOD PRESSURE: 80 MMHG

## 2024-03-06 ASSESSMENT — ENCOUNTER SYMPTOMS
PAIN LOCATION - PAIN QUALITY: ACHY, TENDER
DYSPNEA ACTIVITY LEVEL: AFTER AMBULATING MORE THAN 20 FT
CONTUSION: 1

## 2024-03-06 NOTE — HOME HEALTH
visit.    Medications reconciled and all medications are available in the home this visit. The following education was provided regarding medications:     High alert medication teaching on aspirin, antiplatelet therapy education;purpose, dose, and frequency, food/drug interactions, risks of co-administration with other medications such as ASA, NSAID, and herbals, bleeding prevention strategies such as the use of a soft toothbrush, gentle flossing, use of electric razor, on the potential for increased bleeding from falls and lacerations, to notify medical providers of antiplatelet therapy, consider carrying an ID card, signs and symptoms of abnormal bleeding such as unexplained bruising, dizziness/lightheadedness, red or tarry looking stool, blood in urine, blood in vomit and to call home care agency and/or physician for any problems or concerns.    High alert medication teaching on Daptomycin, Rifampin, Doxycycline, Antibiotic therapy education Purpose, dose, and frequency, Complete course even if you feel better, Side effects such as nausea, vomiting, diarrhea, fungal (yeast) vaginal infections or oral thrush, Complications such as photosensitivity and rash and Severe allergic reaction, anaphylaxis, which requires immediate medical attention.    High alert medication teaching on dilaudid, Opioid medication education Purpose, dose, and frequency, Proper storage Store in the original packaging, Keep in a locked cabinet, lockbox, or other location where they can't easily be accessed, Store medicines out of reach from children, teens, and pets and Do not store in places where medications are usually kept, Proper disposal Return unused medication to the pharmacy, if possible., Locate a medicine take-back option, mail back program or local collection receptable for drug disposal, if possible, May use a medication disposal pouch or container, if provided by the pharmacy. Follow the instructions to deactivate the

## 2024-03-07 ENCOUNTER — HOME CARE VISIT (OUTPATIENT)
Facility: HOME HEALTH | Age: 64
End: 2024-03-07
Payer: COMMERCIAL

## 2024-03-07 ENCOUNTER — HOSPITAL ENCOUNTER (OUTPATIENT)
Facility: HOSPITAL | Age: 64
Setting detail: SPECIMEN
Discharge: HOME | End: 2024-03-10

## 2024-03-07 VITALS
DIASTOLIC BLOOD PRESSURE: 70 MMHG | HEART RATE: 82 BPM | TEMPERATURE: 97.8 F | RESPIRATION RATE: 16 BRPM | SYSTOLIC BLOOD PRESSURE: 110 MMHG | BODY MASS INDEX: 31.46 KG/M2 | OXYGEN SATURATION: 97 % | WEIGHT: 232 LBS

## 2024-03-07 LAB
ALP SERPL-CCNC: 90 U/L (ref 45–117)
AST SERPL-CCNC: 14 U/L (ref 15–37)
BASOPHILS # BLD: 0 K/UL (ref 0–0.1)
BASOPHILS NFR BLD: 1 % (ref 0–1)
BILIRUB SERPL-MCNC: 0.5 MG/DL (ref 0.2–1)
BUN SERPL-MCNC: 39 MG/DL (ref 6–20)
CK SERPL-CCNC: 61 U/L (ref 39–308)
CREAT SERPL-MCNC: 0.91 MG/DL (ref 0.7–1.3)
DIFFERENTIAL METHOD BLD: ABNORMAL
EOSINOPHIL # BLD: 0.1 K/UL (ref 0–0.4)
EOSINOPHIL NFR BLD: 2 % (ref 0–7)
ERYTHROCYTE [DISTWIDTH] IN BLOOD BY AUTOMATED COUNT: 12 % (ref 11.5–14.5)
HCT VFR BLD AUTO: 41.3 % (ref 36.6–50.3)
HGB BLD-MCNC: 14.2 G/DL (ref 12.1–17)
IMM GRANULOCYTES # BLD AUTO: 0 K/UL (ref 0–0.04)
IMM GRANULOCYTES NFR BLD AUTO: 0 % (ref 0–0.5)
LYMPHOCYTES # BLD: 0.9 K/UL (ref 0.8–3.5)
LYMPHOCYTES NFR BLD: 14 % (ref 12–49)
MCH RBC QN AUTO: 31.4 PG (ref 26–34)
MCHC RBC AUTO-ENTMCNC: 34.4 G/DL (ref 30–36.5)
MCV RBC AUTO: 91.4 FL (ref 80–99)
MONOCYTES # BLD: 0.6 K/UL (ref 0–1)
MONOCYTES NFR BLD: 10 % (ref 5–13)
NEUTS SEG # BLD: 4.3 K/UL (ref 1.8–8)
NEUTS SEG NFR BLD: 73 % (ref 32–75)
NRBC # BLD: 0 K/UL (ref 0–0.01)
NRBC BLD-RTO: 0 PER 100 WBC
PLATELET # BLD AUTO: 477 K/UL (ref 150–400)
PMV BLD AUTO: 9.5 FL (ref 8.9–12.9)
RBC # BLD AUTO: 4.52 M/UL (ref 4.1–5.7)
WBC # BLD AUTO: 5.9 K/UL (ref 4.1–11.1)

## 2024-03-07 PROCEDURE — 84450 TRANSFERASE (AST) (SGOT): CPT

## 2024-03-07 PROCEDURE — 82247 BILIRUBIN TOTAL: CPT

## 2024-03-07 PROCEDURE — 82550 ASSAY OF CK (CPK): CPT

## 2024-03-07 PROCEDURE — 84075 ASSAY ALKALINE PHOSPHATASE: CPT

## 2024-03-07 PROCEDURE — 36415 COLL VENOUS BLD VENIPUNCTURE: CPT

## 2024-03-07 PROCEDURE — G0299 HHS/HOSPICE OF RN EA 15 MIN: HCPCS

## 2024-03-07 PROCEDURE — 84520 ASSAY OF UREA NITROGEN: CPT

## 2024-03-07 PROCEDURE — 85025 COMPLETE CBC W/AUTO DIFF WBC: CPT

## 2024-03-07 PROCEDURE — 82565 ASSAY OF CREATININE: CPT

## 2024-03-07 ASSESSMENT — ENCOUNTER SYMPTOMS
STOOL DESCRIPTION: LOOSE
DIARRHEA: 1

## 2024-03-09 NOTE — HOME HEALTH
Subjective: I have had some stomach issues with all these antibiotics. I started taking a probiotic to help.   Falls since last visit No(if yes complete the Fall Tracking Form and include bsrifallreport):   Caregiver involvement changes: none  Home health supplies by type and quantity ordered/delivered this visit include: none    Clinician asked if patient has had any physician contact since last home care visit and patient states: NO  Clinician asked if patient has any new or changed medications and patient states:  YES started probiotic OTC   If Yes, were medications reconciled? NO but verified probiotic and updated med list   Was the certifying physician notified of changes in medications? YES case communication message sent     Clinical assessment (what this visit means for the patient overall and need for ongoing skilled care) and progress or lack of progress towards SPECIFIC goals: Patient with incision to R knee with dressing intact, R upper arm PICC line, and pt independent in IV abx administration via SASH method. Patient with redness around incision and states it has improved greatly. Patient reports loose stools with the two abx he is on and he started a probiotic to help. He said he had only taken it a couple of times and hopes it will help his stomach. Patient educated on hydration and s/s of when to go to ER. Patient requiring SN for PICC care, labs, and education to prevent rehospitalization. Goals not yet met.     Written Teaching Material Utilized: N/A    Interdisciplinary communication with: Dr Wick Physician for the purpose of pt with loose BMs and starting probiotic, case communication message sent to MD    Discharge planning as follows: Is no longer homebound, Per physician order and When goals are met    Specific plan for next visit: PICC dressing change, labs, education on oral medications

## 2024-03-11 ENCOUNTER — HOSPITAL ENCOUNTER (OUTPATIENT)
Facility: HOSPITAL | Age: 64
Setting detail: SPECIMEN
Discharge: HOME | End: 2024-03-14

## 2024-03-11 ENCOUNTER — TELEPHONE (OUTPATIENT)
Facility: CLINIC | Age: 64
End: 2024-03-11

## 2024-03-11 ENCOUNTER — HOME CARE VISIT (OUTPATIENT)
Facility: HOME HEALTH | Age: 64
End: 2024-03-11
Payer: COMMERCIAL

## 2024-03-11 VITALS
TEMPERATURE: 97.3 F | WEIGHT: 232.2 LBS | RESPIRATION RATE: 18 BRPM | DIASTOLIC BLOOD PRESSURE: 76 MMHG | OXYGEN SATURATION: 97 % | HEART RATE: 79 BPM | SYSTOLIC BLOOD PRESSURE: 130 MMHG | BODY MASS INDEX: 31.49 KG/M2

## 2024-03-11 PROCEDURE — 82565 ASSAY OF CREATININE: CPT

## 2024-03-11 PROCEDURE — G0299 HHS/HOSPICE OF RN EA 15 MIN: HCPCS

## 2024-03-11 PROCEDURE — 84520 ASSAY OF UREA NITROGEN: CPT

## 2024-03-11 PROCEDURE — 86140 C-REACTIVE PROTEIN: CPT

## 2024-03-11 PROCEDURE — 84450 TRANSFERASE (AST) (SGOT): CPT

## 2024-03-11 PROCEDURE — 85025 COMPLETE CBC W/AUTO DIFF WBC: CPT

## 2024-03-11 PROCEDURE — 36415 COLL VENOUS BLD VENIPUNCTURE: CPT

## 2024-03-11 PROCEDURE — 82247 BILIRUBIN TOTAL: CPT

## 2024-03-11 PROCEDURE — 84075 ASSAY ALKALINE PHOSPHATASE: CPT

## 2024-03-11 PROCEDURE — 82550 ASSAY OF CK (CPK): CPT

## 2024-03-11 ASSESSMENT — ENCOUNTER SYMPTOMS: PAIN LOCATION - PAIN QUALITY: ACHING

## 2024-03-11 NOTE — HOME HEALTH
Subjective: I have been doing better. I feel like my swelling is going down. I had my follow up with the MD and they removed the dressing but put another one on for now.   Falls since last visit No(if yes complete the Fall Tracking Form and include bsrifallreport):   Caregiver involvement changes: none  Home health supplies by type and quantity ordered/delivered this visit include: none    Clinician asked if patient has had any physician contact since last home care visit and patient states: YES saw surgeon who removed dressing and replaced with another one, did not remove any staples or stitches   Clinician asked if patient has any new or changed medications and patient states:  NO   If Yes, were medications reconciled? N/A   Was the certifying physician notified of changes in medications? N/A     Clinical assessment (what this visit means for the patient overall and need for ongoing skilled care) and progress or lack of progress towards SPECIFIC goals: Patient seen by  for PICC management, weekly labs and monitoring of surgical incision on R knee. Patient is independent in abx administration and PICC is free of any s/s of infection. Swelling around R knee incision has improved from last SN visit. SN is required for PICC dressing changes, weekly labs and education to prevent rehospitalization.     Written Teaching Material Utilized: N/A    Interdisciplinary communication with: N/A     Discharge planning as follows: Is no longer homebound, Per physician order and When goals are met    Specific plan for next visit: PICC dressing change, labs    Pt has appt with MD on Tuesday 3/19/24 for dressing removal and to remove staples.

## 2024-03-11 NOTE — TELEPHONE ENCOUNTER
Pt requesting call back to advise if ok to proceed with dental treatment scheduled at 4 pm today for broken tooth, stating he's on IV and oral antibiotics, and was advised not have dental procedures after surgery.    Please advise at [phone] as soon as possible.  Pt scheduled at [facility].  Luke

## 2024-03-12 LAB
ALP SERPL-CCNC: 84 U/L (ref 45–117)
AST SERPL-CCNC: 14 U/L (ref 15–37)
BASOPHILS # BLD: 0 K/UL (ref 0–0.1)
BASOPHILS NFR BLD: 1 % (ref 0–1)
BILIRUB SERPL-MCNC: 0.4 MG/DL (ref 0.2–1)
BUN SERPL-MCNC: 34 MG/DL (ref 6–20)
CK SERPL-CCNC: 78 U/L (ref 39–308)
CREAT SERPL-MCNC: 0.9 MG/DL (ref 0.7–1.3)
CRP SERPL-MCNC: 1.29 MG/DL (ref 0–0.3)
DIFFERENTIAL METHOD BLD: ABNORMAL
EOSINOPHIL # BLD: 0.1 K/UL (ref 0–0.4)
EOSINOPHIL NFR BLD: 2 % (ref 0–7)
ERYTHROCYTE [DISTWIDTH] IN BLOOD BY AUTOMATED COUNT: 12.4 % (ref 11.5–14.5)
HCT VFR BLD AUTO: 41.5 % (ref 36.6–50.3)
HGB BLD-MCNC: 13.9 G/DL (ref 12.1–17)
IMM GRANULOCYTES # BLD AUTO: 0 K/UL (ref 0–0.04)
IMM GRANULOCYTES NFR BLD AUTO: 0 % (ref 0–0.5)
LYMPHOCYTES # BLD: 0.9 K/UL (ref 0.8–3.5)
LYMPHOCYTES NFR BLD: 16 % (ref 12–49)
MCH RBC QN AUTO: 31.5 PG (ref 26–34)
MCHC RBC AUTO-ENTMCNC: 33.5 G/DL (ref 30–36.5)
MCV RBC AUTO: 94.1 FL (ref 80–99)
MONOCYTES # BLD: 0.6 K/UL (ref 0–1)
MONOCYTES NFR BLD: 12 % (ref 5–13)
NEUTS SEG # BLD: 3.7 K/UL (ref 1.8–8)
NEUTS SEG NFR BLD: 69 % (ref 32–75)
NRBC # BLD: 0 K/UL (ref 0–0.01)
NRBC BLD-RTO: 0 PER 100 WBC
PLATELET # BLD AUTO: 435 K/UL (ref 150–400)
PMV BLD AUTO: 9.9 FL (ref 8.9–12.9)
RBC # BLD AUTO: 4.41 M/UL (ref 4.1–5.7)
WBC # BLD AUTO: 5.3 K/UL (ref 4.1–11.1)

## 2024-03-13 LAB
BACTERIA SPEC CULT: NORMAL
GRAM STN SPEC: NORMAL
GRAM STN SPEC: NORMAL
SERVICE CMNT-IMP: NORMAL

## 2024-03-18 ENCOUNTER — HOSPITAL ENCOUNTER (OUTPATIENT)
Facility: HOSPITAL | Age: 64
Setting detail: SPECIMEN
Discharge: HOME | End: 2024-03-21

## 2024-03-18 ENCOUNTER — HOME CARE VISIT (OUTPATIENT)
Facility: HOME HEALTH | Age: 64
End: 2024-03-18
Payer: COMMERCIAL

## 2024-03-18 VITALS
SYSTOLIC BLOOD PRESSURE: 114 MMHG | RESPIRATION RATE: 16 BRPM | WEIGHT: 234 LBS | DIASTOLIC BLOOD PRESSURE: 68 MMHG | BODY MASS INDEX: 31.74 KG/M2 | HEART RATE: 82 BPM | TEMPERATURE: 97.4 F | OXYGEN SATURATION: 96 %

## 2024-03-18 LAB
ALP SERPL-CCNC: 79 U/L (ref 45–117)
AST SERPL-CCNC: 21 U/L (ref 15–37)
BASOPHILS # BLD: 0 K/UL (ref 0–0.1)
BASOPHILS NFR BLD: 1 % (ref 0–1)
BILIRUB SERPL-MCNC: 0.3 MG/DL (ref 0.2–1)
BUN SERPL-MCNC: 32 MG/DL (ref 6–20)
CK SERPL-CCNC: 120 U/L (ref 39–308)
CREAT SERPL-MCNC: 0.85 MG/DL (ref 0.7–1.3)
CRP SERPL-MCNC: 0.32 MG/DL (ref 0–0.3)
DIFFERENTIAL METHOD BLD: ABNORMAL
EOSINOPHIL # BLD: 0.1 K/UL (ref 0–0.4)
EOSINOPHIL NFR BLD: 4 % (ref 0–7)
ERYTHROCYTE [DISTWIDTH] IN BLOOD BY AUTOMATED COUNT: 12.4 % (ref 11.5–14.5)
HCT VFR BLD AUTO: 42.2 % (ref 36.6–50.3)
HGB BLD-MCNC: 14.5 G/DL (ref 12.1–17)
IMM GRANULOCYTES # BLD AUTO: 0 K/UL (ref 0–0.04)
IMM GRANULOCYTES NFR BLD AUTO: 0 % (ref 0–0.5)
LYMPHOCYTES # BLD: 1.1 K/UL (ref 0.8–3.5)
LYMPHOCYTES NFR BLD: 28 % (ref 12–49)
MCH RBC QN AUTO: 31.5 PG (ref 26–34)
MCHC RBC AUTO-ENTMCNC: 34.4 G/DL (ref 30–36.5)
MCV RBC AUTO: 91.7 FL (ref 80–99)
MONOCYTES # BLD: 0.5 K/UL (ref 0–1)
MONOCYTES NFR BLD: 13 % (ref 5–13)
NEUTS SEG # BLD: 2.2 K/UL (ref 1.8–8)
NEUTS SEG NFR BLD: 54 % (ref 32–75)
NRBC # BLD: 0 K/UL (ref 0–0.01)
NRBC BLD-RTO: 0 PER 100 WBC
PLATELET # BLD AUTO: 301 K/UL (ref 150–400)
PMV BLD AUTO: 10.5 FL (ref 8.9–12.9)
RBC # BLD AUTO: 4.6 M/UL (ref 4.1–5.7)
WBC # BLD AUTO: 4 K/UL (ref 4.1–11.1)

## 2024-03-18 PROCEDURE — G0299 HHS/HOSPICE OF RN EA 15 MIN: HCPCS

## 2024-03-18 ASSESSMENT — ENCOUNTER SYMPTOMS: PAIN LOCATION - PAIN QUALITY: SORE

## 2024-03-19 NOTE — HOME HEALTH
Subjective: I sometimes have pain when I flush after my abx.   Falls since last visit No(if yes complete the Fall Tracking Form and include bsrifallreport):   Caregiver involvement changes: none  Home health supplies by type and quantity ordered/delivered this visit include: none    Clinician asked if patient has had any physician contact since last home care visit and patient states: NO  Clinician asked if patient has any new or changed medications and patient states:  YES has been taking aleve instead of advil  If Yes, were medications reconciled? N/A   Was the certifying physician notified of changes in medications? YES case communication sent     Clinical assessment (what this visit means for the patient overall and need for ongoing skilled care) and progress or lack of progress towards SPECIFIC goals: Patient with R knee incision with dressing clean dry and intact, swelling has reduced from last SN visit and redness is reduced to one small area to R of dressing. Pt with R upper PICC for IV abx. Patient reports some pain with flushing after IV abx are done. No s/s of infection and good blood return. MD made aware. Patient progressing towards education goals but remains at risk for falls, incision complications and PICC complications. Goals not met.    Written Teaching Material Utilized: N/A    Interdisciplinary communication with: Dr. Wick Physician for the purpose of notification of pain with flushing, updated on aleve, case communication sent     Discharge planning as follows: Is no longer homebound, Per physician order and When goals are met    Specific plan for next visit: PICC dressing change, labs, assess R knee incision as pt is getting staples removed later this week

## 2024-03-25 ENCOUNTER — HOME CARE VISIT (OUTPATIENT)
Facility: HOME HEALTH | Age: 64
End: 2024-03-25
Payer: COMMERCIAL

## 2024-03-25 ENCOUNTER — HOSPITAL ENCOUNTER (OUTPATIENT)
Facility: HOSPITAL | Age: 64
Setting detail: SPECIMEN
Discharge: HOME | End: 2024-03-28

## 2024-03-25 VITALS
RESPIRATION RATE: 16 BRPM | SYSTOLIC BLOOD PRESSURE: 108 MMHG | TEMPERATURE: 97.8 F | DIASTOLIC BLOOD PRESSURE: 78 MMHG | WEIGHT: 234.3 LBS | BODY MASS INDEX: 31.78 KG/M2 | HEART RATE: 80 BPM | OXYGEN SATURATION: 98 %

## 2024-03-25 PROCEDURE — 86140 C-REACTIVE PROTEIN: CPT

## 2024-03-25 PROCEDURE — 82247 BILIRUBIN TOTAL: CPT

## 2024-03-25 PROCEDURE — 84075 ASSAY ALKALINE PHOSPHATASE: CPT

## 2024-03-25 PROCEDURE — 36415 COLL VENOUS BLD VENIPUNCTURE: CPT

## 2024-03-25 PROCEDURE — 84450 TRANSFERASE (AST) (SGOT): CPT

## 2024-03-25 PROCEDURE — G0299 HHS/HOSPICE OF RN EA 15 MIN: HCPCS

## 2024-03-25 PROCEDURE — 82550 ASSAY OF CK (CPK): CPT

## 2024-03-25 PROCEDURE — 84520 ASSAY OF UREA NITROGEN: CPT

## 2024-03-25 PROCEDURE — 85025 COMPLETE CBC W/AUTO DIFF WBC: CPT

## 2024-03-25 ASSESSMENT — ENCOUNTER SYMPTOMS: PAIN LOCATION - PAIN QUALITY: BURNING, SORE

## 2024-03-25 NOTE — HOME HEALTH
Subjective: I don't have that pain anymore when I flush. I haven't had any other issues at all.   Falls since last visit No(if yes complete the Fall Tracking Form and include bsrifallreport):   Caregiver involvement changes: none  Home health supplies by type and quantity ordered/delivered this visit include: none    Clinician asked if patient has had any physician contact since last home care visit and patient states: YES had dressing and sutures removed   Clinician asked if patient has any new or changed medications and patient states:  NO   If Yes, were medications reconciled? N/A   Was the certifying physician notified of changes in medications? N/A     Clinical assessment (what this visit means for the patient overall and need for ongoing skilled care) and progress or lack of progress towards SPECIFIC goals: Patient with R upper PICC and IV abx that he is independent in administering. Patient is progressing towards education goals and incision is free of s/s of infection and well approximated. Patient swelling has continued to decrease in R knee. Patient is progressing towards goals but requires SN for PICC dressing changes and weekly labs.     Written Teaching Material Utilized: N/A    Interdisciplinary communication with: N/A     Discharge planning as follows: Is no longer homebound, Per physician order and When goals are met    Specific plan for next visit: PICC dressing change, labs, educate on disease process

## 2024-03-26 ENCOUNTER — HOME CARE VISIT (OUTPATIENT)
Dept: HOME HEALTH SERVICES | Facility: HOME HEALTH | Age: 64
End: 2024-03-26
Payer: COMMERCIAL

## 2024-03-26 LAB
ALP SERPL-CCNC: 74 U/L (ref 45–117)
AST SERPL-CCNC: 21 U/L (ref 15–37)
BASOPHILS # BLD: 0 K/UL (ref 0–0.1)
BASOPHILS NFR BLD: 1 % (ref 0–1)
BILIRUB SERPL-MCNC: 0.3 MG/DL (ref 0.2–1)
BUN SERPL-MCNC: 36 MG/DL (ref 6–20)
CK SERPL-CCNC: 127 U/L (ref 39–308)
CRP SERPL-MCNC: 0.36 MG/DL (ref 0–0.3)
DIFFERENTIAL METHOD BLD: NORMAL
EOSINOPHIL # BLD: 0.3 K/UL (ref 0–0.4)
EOSINOPHIL NFR BLD: 6 % (ref 0–7)
ERYTHROCYTE [DISTWIDTH] IN BLOOD BY AUTOMATED COUNT: 12.7 % (ref 11.5–14.5)
HCT VFR BLD AUTO: 44.4 % (ref 36.6–50.3)
HGB BLD-MCNC: 14.7 G/DL (ref 12.1–17)
IMM GRANULOCYTES # BLD AUTO: 0 K/UL (ref 0–0.04)
IMM GRANULOCYTES NFR BLD AUTO: 0 % (ref 0–0.5)
LYMPHOCYTES # BLD: 0.8 K/UL (ref 0.8–3.5)
LYMPHOCYTES NFR BLD: 17 % (ref 12–49)
MCH RBC QN AUTO: 31.1 PG (ref 26–34)
MCHC RBC AUTO-ENTMCNC: 33.1 G/DL (ref 30–36.5)
MCV RBC AUTO: 94.1 FL (ref 80–99)
MONOCYTES # BLD: 0.5 K/UL (ref 0–1)
MONOCYTES NFR BLD: 12 % (ref 5–13)
NEUTS SEG # BLD: 2.9 K/UL (ref 1.8–8)
NEUTS SEG NFR BLD: 64 % (ref 32–75)
NRBC # BLD: 0 K/UL (ref 0–0.01)
NRBC BLD-RTO: 0 PER 100 WBC
PLATELET # BLD AUTO: 232 K/UL (ref 150–400)
PMV BLD AUTO: 11.8 FL (ref 8.9–12.9)
RBC # BLD AUTO: 4.72 M/UL (ref 4.1–5.7)
RBC MORPH BLD: NORMAL
WBC # BLD AUTO: 4.5 K/UL (ref 4.1–11.1)

## 2024-04-01 ENCOUNTER — HOME CARE VISIT (OUTPATIENT)
Facility: HOME HEALTH | Age: 64
End: 2024-04-01
Payer: COMMERCIAL

## 2024-04-01 VITALS
SYSTOLIC BLOOD PRESSURE: 112 MMHG | HEART RATE: 80 BPM | RESPIRATION RATE: 18 BRPM | OXYGEN SATURATION: 97 % | TEMPERATURE: 97.4 F | DIASTOLIC BLOOD PRESSURE: 70 MMHG

## 2024-04-01 PROCEDURE — G0299 HHS/HOSPICE OF RN EA 15 MIN: HCPCS

## 2024-04-02 LAB
ALP SERPL-CCNC: 77 IU/L (ref 44–121)
AST SERPL-CCNC: 27 IU/L (ref 0–40)
BASOPHILS # BLD AUTO: 0 X10E3/UL (ref 0–0.2)
BASOPHILS NFR BLD AUTO: 1 %
BILIRUB SERPL-MCNC: 0.4 MG/DL (ref 0–1.2)
BUN SERPL-MCNC: 28 MG/DL (ref 8–27)
CK SERPL-CCNC: 250 U/L (ref 41–331)
CREAT SERPL-MCNC: 0.79 MG/DL (ref 0.76–1.27)
CRP SERPL-MCNC: 5 MG/L (ref 0–10)
EGFRCR SERPLBLD CKD-EPI 2021: 100 ML/MIN/1.73
EOSINOPHIL # BLD AUTO: 0.2 X10E3/UL (ref 0–0.4)
EOSINOPHIL NFR BLD AUTO: 4 %
ERYTHROCYTE [DISTWIDTH] IN BLOOD BY AUTOMATED COUNT: 12.8 % (ref 11.6–15.4)
HCT VFR BLD AUTO: 43.3 % (ref 37.5–51)
HGB BLD-MCNC: 14.9 G/DL (ref 13–17.7)
IMM GRANULOCYTES # BLD AUTO: 0 X10E3/UL (ref 0–0.1)
IMM GRANULOCYTES NFR BLD AUTO: 0 %
LYMPHOCYTES # BLD AUTO: 0.7 X10E3/UL (ref 0.7–3.1)
LYMPHOCYTES NFR BLD AUTO: 16 %
MCH RBC QN AUTO: 31.4 PG (ref 26.6–33)
MCHC RBC AUTO-ENTMCNC: 34.4 G/DL (ref 31.5–35.7)
MCV RBC AUTO: 91 FL (ref 79–97)
MONOCYTES # BLD AUTO: 0.4 X10E3/UL (ref 0.1–0.9)
MONOCYTES NFR BLD AUTO: 10 %
NEUTROPHILS # BLD AUTO: 2.8 X10E3/UL (ref 1.4–7)
NEUTROPHILS NFR BLD AUTO: 69 %
PLATELET # BLD AUTO: 187 X10E3/UL (ref 150–450)
RBC # BLD AUTO: 4.74 X10E6/UL (ref 4.14–5.8)
WBC # BLD AUTO: 4.1 X10E3/UL (ref 3.4–10.8)

## 2024-04-08 ENCOUNTER — HOSPITAL ENCOUNTER (OUTPATIENT)
Facility: HOSPITAL | Age: 64
Setting detail: SPECIMEN
Discharge: HOME | End: 2024-04-11

## 2024-04-08 ENCOUNTER — HOME CARE VISIT (OUTPATIENT)
Facility: HOME HEALTH | Age: 64
End: 2024-04-08
Payer: COMMERCIAL

## 2024-04-08 VITALS — HEART RATE: 83 BPM | TEMPERATURE: 97.7 F | OXYGEN SATURATION: 97 %

## 2024-04-08 LAB
ALP SERPL-CCNC: 82 U/L (ref 45–117)
AST SERPL-CCNC: 16 U/L (ref 15–37)
BASOPHILS # BLD: 0 K/UL (ref 0–0.1)
BASOPHILS NFR BLD: 1 % (ref 0–1)
BILIRUB SERPL-MCNC: 0.5 MG/DL (ref 0.2–1)
BUN SERPL-MCNC: 30 MG/DL (ref 6–20)
CK SERPL-CCNC: 109 U/L (ref 39–308)
COMMENT:: NORMAL
CRP SERPL-MCNC: 0.72 MG/DL (ref 0–0.3)
DIFFERENTIAL METHOD BLD: ABNORMAL
EOSINOPHIL # BLD: 0.1 K/UL (ref 0–0.4)
EOSINOPHIL NFR BLD: 3 % (ref 0–7)
ERYTHROCYTE [DISTWIDTH] IN BLOOD BY AUTOMATED COUNT: 12.9 % (ref 11.5–14.5)
HCT VFR BLD AUTO: 45 % (ref 36.6–50.3)
HGB BLD-MCNC: 15.6 G/DL (ref 12.1–17)
IMM GRANULOCYTES # BLD AUTO: 0 K/UL (ref 0–0.04)
IMM GRANULOCYTES NFR BLD AUTO: 0 % (ref 0–0.5)
LYMPHOCYTES # BLD: 0.9 K/UL (ref 0.8–3.5)
LYMPHOCYTES NFR BLD: 22 % (ref 12–49)
MCH RBC QN AUTO: 32 PG (ref 26–34)
MCHC RBC AUTO-ENTMCNC: 34.7 G/DL (ref 30–36.5)
MCV RBC AUTO: 92.2 FL (ref 80–99)
MONOCYTES # BLD: 0.5 K/UL (ref 0–1)
MONOCYTES NFR BLD: 13 % (ref 5–13)
NEUTS SEG # BLD: 2.4 K/UL (ref 1.8–8)
NEUTS SEG NFR BLD: 61 % (ref 32–75)
NRBC # BLD: 0 K/UL (ref 0–0.01)
NRBC BLD-RTO: 0 PER 100 WBC
PLATELET # BLD AUTO: 279 K/UL (ref 150–400)
PMV BLD AUTO: 10.7 FL (ref 8.9–12.9)
RBC # BLD AUTO: 4.88 M/UL (ref 4.1–5.7)
SPECIMEN HOLD: NORMAL
WBC # BLD AUTO: 3.9 K/UL (ref 4.1–11.1)

## 2024-04-08 PROCEDURE — G0299 HHS/HOSPICE OF RN EA 15 MIN: HCPCS

## 2024-04-08 ASSESSMENT — ENCOUNTER SYMPTOMS: PAIN LOCATION - PAIN QUALITY: ACHY, SORE

## 2024-04-09 NOTE — HOME HEALTH
Subjective: I have one week left and then I will be done with the IV abx.   Falls since last visit No(if yes complete the Fall Tracking Form and include bsrifallreport):   Caregiver involvement changes: none  Home health supplies by type and quantity ordered/delivered this visit include: none    Clinician asked if patient has had any physician contact since last home care visit and patient states: NO but sees ID MD this week   Clinician asked if patient has any new or changed medications and patient states:  NO   If Yes, were medications reconciled? N/A   Was the certifying physician notified of changes in medications? N/A     Clinical assessment (what this visit means for the patient overall and need for ongoing skilled care) and progress or lack of progress towards SPECIFIC goals: Pt seen by  for PICC line management, weekly labs, and education. Patient has one week left of IV abx and then switches to PO abx. Patient is ready for discharge once PICC line has been pulled. Patient is at risk for infection r/t PICC Line.     Written Teaching Material Utilized: N/A    Interdisciplinary communication with: N/A     Discharge planning as follows: Is no longer homebound, Per physician order, When goals are met and when pt completes IV abx    Specific plan for next visit: labs, pull PICC Line, discharge patient

## 2024-04-11 ENCOUNTER — OFFICE VISIT (OUTPATIENT)
Facility: CLINIC | Age: 64
End: 2024-04-11
Payer: COMMERCIAL

## 2024-04-11 VITALS
BODY MASS INDEX: 31.69 KG/M2 | DIASTOLIC BLOOD PRESSURE: 77 MMHG | SYSTOLIC BLOOD PRESSURE: 121 MMHG | HEART RATE: 76 BPM | RESPIRATION RATE: 20 BRPM | WEIGHT: 234 LBS | HEIGHT: 72 IN | TEMPERATURE: 97 F

## 2024-04-11 DIAGNOSIS — Z96.659 INFECTION OF PROSTHETIC KNEE JOINT, SUBSEQUENT ENCOUNTER: ICD-10-CM

## 2024-04-11 DIAGNOSIS — T84.59XD INFECTION OF PROSTHETIC KNEE JOINT, SUBSEQUENT ENCOUNTER: Primary | ICD-10-CM

## 2024-04-11 DIAGNOSIS — Z96.659 INFECTION OF PROSTHETIC KNEE JOINT, SUBSEQUENT ENCOUNTER: Primary | ICD-10-CM

## 2024-04-11 DIAGNOSIS — T84.59XD INFECTION OF PROSTHETIC KNEE JOINT, SUBSEQUENT ENCOUNTER: ICD-10-CM

## 2024-04-11 LAB — CREATINE SERPL-MCNC: 0.6 MG/DL (ref 0–0.7)

## 2024-04-11 PROCEDURE — 99214 OFFICE O/P EST MOD 30 MIN: CPT | Performed by: INTERNAL MEDICINE

## 2024-04-11 NOTE — PROGRESS NOTES
Chief Complaint   Patient presents with    Follow-Up from Hospital     Diagnosis: Prosthetic knee infection.  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 twice daily through 4/14/2024

## 2024-04-11 NOTE — PROGRESS NOTES
[facility] Progress Note  Salvador Wick DO  [phone] Office  [phone] Fax    4/11/2024      Assessment & Plan:     Right knee prosthetic joint infection.  Status post I&D with poly exchange 2/28/2024.  Surgical cultures growing oxacillin sensitive Staphylococcus xylosus.   Plan 6 weeks of daptomycin and rifampin followed by 4.5 months of doxycycline plus rifampin.  Daptomycin scheduled to be completed 4/14.  Advised patient he may restart statin once daptomycin has been completed.  RTO 8 weeks  Lymphopenia.  WBC 3.9.  Follow trend.  Will check labs in 2 weeks once IV antibiotics are complete.  Patient to contact me at that time to review results and determine frequency of repeat lab testing  Monocytosis.  Follow  Hyperglycemia.  Hemoglobin A1c 5.          Subjective:     No complaints    Objective:     Vitals: /77   Pulse 76   Temp 97 °F (36.1 °C) (Temporal)   Resp 20   Ht 1.829 m (6')   Wt 106.1 kg (234 lb)   BMI 31.74 kg/m²      Physical Examination:   General:  Alert, cooperative, no distress   Head:  Normocephalic, atraumatic.   Eyes:  Conjunctivae clear   Neck: Supple       Lungs:   No distress.     Chest wall:     Heart:     Abdomen:   non-distended   Extremities: Moves all.  Right knee effusion.  No erythema or tenderness   Skin: No acute rash on exposed skin   Neurologic: CNII-XII intact. Normal strength     Labs:        Invalid input(s): \"ITNL\"   No results for input(s): \"CPK\", \"CKMB\" in the last 72 hours.    Invalid input(s): \"TROIQ\"  No results for input(s): \"NA\", \"K\", \"CL\", \"CO2\", \"BUN\", \"CREA\", \"GLU\", \"PHOS\", \"MG\", \"CA\", \"ALB\", \"WBC\", \"HGB\", \"HCT\", \"PLT\" in the last 72 hours.  No results for input(s): \"INR\", \"APTT\" in the last 72 hours.    Invalid input(s): \"PTP\"  Needs: urine analysis, urine sodium, protein and creatinine  No results found for: \"LORRAINE\", \"KU\", \"CREAU\"      Cultures:     No results found for: \"SDES\"  No components found for:

## 2024-04-15 ENCOUNTER — HOME CARE VISIT (OUTPATIENT)
Facility: HOME HEALTH | Age: 64
End: 2024-04-15
Payer: COMMERCIAL

## 2024-04-15 ENCOUNTER — HOSPITAL ENCOUNTER (OUTPATIENT)
Facility: HOSPITAL | Age: 64
Setting detail: SPECIMEN
Discharge: HOME | End: 2024-04-18

## 2024-04-15 VITALS
OXYGEN SATURATION: 100 % | SYSTOLIC BLOOD PRESSURE: 138 MMHG | HEART RATE: 78 BPM | BODY MASS INDEX: 31.74 KG/M2 | RESPIRATION RATE: 16 BRPM | TEMPERATURE: 97.2 F | WEIGHT: 234 LBS | DIASTOLIC BLOOD PRESSURE: 72 MMHG

## 2024-04-15 LAB
BASOPHILS # BLD: 0 K/UL (ref 0–0.1)
BASOPHILS NFR BLD: 1 % (ref 0–1)
DIFFERENTIAL METHOD BLD: ABNORMAL
EOSINOPHIL # BLD: 0.1 K/UL (ref 0–0.4)
EOSINOPHIL NFR BLD: 2 % (ref 0–7)
ERYTHROCYTE [DISTWIDTH] IN BLOOD BY AUTOMATED COUNT: 12.9 % (ref 11.5–14.5)
HCT VFR BLD AUTO: 45.3 % (ref 36.6–50.3)
HGB BLD-MCNC: 14.9 G/DL (ref 12.1–17)
IMM GRANULOCYTES # BLD AUTO: 0 K/UL (ref 0–0.04)
IMM GRANULOCYTES NFR BLD AUTO: 0 % (ref 0–0.5)
LYMPHOCYTES # BLD: 0.8 K/UL (ref 0.8–3.5)
LYMPHOCYTES NFR BLD: 26 % (ref 12–49)
MCH RBC QN AUTO: 31.2 PG (ref 26–34)
MCHC RBC AUTO-ENTMCNC: 32.9 G/DL (ref 30–36.5)
MCV RBC AUTO: 94.8 FL (ref 80–99)
MONOCYTES # BLD: 0.4 K/UL (ref 0–1)
MONOCYTES NFR BLD: 13 % (ref 5–13)
NEUTS SEG # BLD: 1.8 K/UL (ref 1.8–8)
NEUTS SEG NFR BLD: 58 % (ref 32–75)
NRBC # BLD: 0 K/UL (ref 0–0.01)
NRBC BLD-RTO: 0 PER 100 WBC
PLATELET # BLD AUTO: 278 K/UL (ref 150–400)
PMV BLD AUTO: 11.6 FL (ref 8.9–12.9)
RBC # BLD AUTO: 4.78 M/UL (ref 4.1–5.7)
WBC # BLD AUTO: 3.2 K/UL (ref 4.1–11.1)

## 2024-04-15 PROCEDURE — G0299 HHS/HOSPICE OF RN EA 15 MIN: HCPCS

## 2024-04-15 ASSESSMENT — ENCOUNTER SYMPTOMS: PAIN LOCATION - PAIN QUALITY: ACHING

## 2024-04-16 LAB
ALP SERPL-CCNC: 76 U/L (ref 45–117)
AST SERPL-CCNC: 24 U/L (ref 15–37)
BILIRUB SERPL-MCNC: 0.5 MG/DL (ref 0.2–1)
BUN SERPL-MCNC: 32 MG/DL (ref 6–20)
CK SERPL-CCNC: 164 U/L (ref 39–308)
CREAT SERPL-MCNC: 0.88 MG/DL (ref 0.7–1.3)
CRP SERPL-MCNC: 0.33 MG/DL (ref 0–0.3)

## 2024-04-16 NOTE — HOME HEALTH
Subjective: I finished my IV abx last night and I have started my oral antibiotic now.   Falls since last visit No(if yes complete the Fall Tracking Form and include bsrifallreport):   Caregiver involvement changes: none    Clinician asked if patient has had any physician contact since last home care visit and patient states: YES  Clinician asked if patient has any new or changed medications and patient states:  YES finished IV abx and started doxycycline PO  If Yes, were medications reconciled? YES   Was the certifying physician notified of changes in medications? N/A     A list of reconciled medications has been given to the patient/caregiver .      Clinical assessment (what this visit means for the patient overall and need for ongoing skilled care) and progress or lack of progress towards SPECIFIC goals: Patient seen by  for R upper PICC line management and dressing changes, weekly labs, and incision assessment and education following sepsis and surgery to replace surgical joint on R knee. Patient lives with wife in two story home, ambulates independently, performs all ADLs independently. IV abx are complete as of 4/14/24 and after labs were obtained, PICC line pulled without complications. Patient educated on care over next 24 hours. Patient has met all goals.     Discharge Instructions:    Written Teaching Material Utilized:  handbook     Instructed patient/caregiver on the following: Take all medications exactly as prescribed by physician. Updated medication list present in the home at discharge, Keep all scheduled medical appointments, Wash hands frequently to control the spread of infection, Follow safety and fall prevention education to prevent falls and Continue home exercises as instructed by your therapist    Call physician for: continuous pain, abnormal bleeding, high fever over 100.4 degrees, increased pain, new problem and frequent falls    The patient/caregiver expressed knowledge and

## 2024-04-26 LAB
ALBUMIN SERPL-MCNC: 4.7 G/DL (ref 3.9–4.9)
ALBUMIN/GLOB SERPL: 1.9 {RATIO} (ref 1.2–2.2)
ALP SERPL-CCNC: 82 IU/L (ref 44–121)
ALT SERPL-CCNC: 27 IU/L (ref 0–44)
AST SERPL-CCNC: 26 IU/L (ref 0–40)
BASOPHILS # BLD AUTO: 0 X10E3/UL (ref 0–0.2)
BASOPHILS NFR BLD AUTO: 1 %
BILIRUB SERPL-MCNC: 0.4 MG/DL (ref 0–1.2)
BUN SERPL-MCNC: 32 MG/DL (ref 8–27)
BUN/CREAT SERPL: 34 (ref 10–24)
CALCIUM SERPL-MCNC: 10.3 MG/DL (ref 8.6–10.2)
CHLORIDE SERPL-SCNC: 95 MMOL/L (ref 96–106)
CO2 SERPL-SCNC: 29 MMOL/L (ref 20–29)
CREAT SERPL-MCNC: 0.94 MG/DL (ref 0.76–1.27)
CRP SERPL-MCNC: 2 MG/L (ref 0–10)
EGFRCR SERPLBLD CKD-EPI 2021: 92 ML/MIN/1.73
EOSINOPHIL # BLD AUTO: 0.1 X10E3/UL (ref 0–0.4)
EOSINOPHIL NFR BLD AUTO: 2 %
ERYTHROCYTE [DISTWIDTH] IN BLOOD BY AUTOMATED COUNT: 12.5 % (ref 11.6–15.4)
GLOBULIN SER CALC-MCNC: 2.5 G/DL (ref 1.5–4.5)
GLUCOSE SERPL-MCNC: 88 MG/DL (ref 70–99)
HCT VFR BLD AUTO: 48.3 % (ref 37.5–51)
HGB BLD-MCNC: 16.3 G/DL (ref 13–17.7)
IMM GRANULOCYTES # BLD AUTO: 0 X10E3/UL (ref 0–0.1)
IMM GRANULOCYTES NFR BLD AUTO: 0 %
LYMPHOCYTES # BLD AUTO: 0.9 X10E3/UL (ref 0.7–3.1)
LYMPHOCYTES NFR BLD AUTO: 21 %
MCH RBC QN AUTO: 31.5 PG (ref 26.6–33)
MCHC RBC AUTO-ENTMCNC: 33.7 G/DL (ref 31.5–35.7)
MCV RBC AUTO: 93 FL (ref 79–97)
MONOCYTES # BLD AUTO: 0.6 X10E3/UL (ref 0.1–0.9)
MONOCYTES NFR BLD AUTO: 13 %
NEUTROPHILS # BLD AUTO: 2.8 X10E3/UL (ref 1.4–7)
NEUTROPHILS NFR BLD AUTO: 63 %
PLATELET # BLD AUTO: 265 X10E3/UL (ref 150–450)
POTASSIUM SERPL-SCNC: 4.2 MMOL/L (ref 3.5–5.2)
PROT SERPL-MCNC: 7.2 G/DL (ref 6–8.5)
RBC # BLD AUTO: 5.17 X10E6/UL (ref 4.14–5.8)
SODIUM SERPL-SCNC: 138 MMOL/L (ref 134–144)
WBC # BLD AUTO: 4.3 X10E3/UL (ref 3.4–10.8)

## 2024-05-24 LAB
ALBUMIN SERPL-MCNC: 4.7 G/DL (ref 3.9–4.9)
ALBUMIN/GLOB SERPL: 1.9 {RATIO} (ref 1.2–2.2)
ALP SERPL-CCNC: 78 IU/L (ref 44–121)
ALT SERPL-CCNC: 23 IU/L (ref 0–44)
AST SERPL-CCNC: 22 IU/L (ref 0–40)
BASOPHILS # BLD AUTO: 0 X10E3/UL (ref 0–0.2)
BASOPHILS NFR BLD AUTO: 1 %
BILIRUB SERPL-MCNC: 0.4 MG/DL (ref 0–1.2)
BUN SERPL-MCNC: 27 MG/DL (ref 8–27)
BUN/CREAT SERPL: 32 (ref 10–24)
CALCIUM SERPL-MCNC: 10.6 MG/DL (ref 8.6–10.2)
CHLORIDE SERPL-SCNC: 96 MMOL/L (ref 96–106)
CO2 SERPL-SCNC: 27 MMOL/L (ref 20–29)
CREAT SERPL-MCNC: 0.85 MG/DL (ref 0.76–1.27)
CRP SERPL-MCNC: 1 MG/L (ref 0–10)
EGFRCR SERPLBLD CKD-EPI 2021: 98 ML/MIN/1.73
EOSINOPHIL # BLD AUTO: 0.1 X10E3/UL (ref 0–0.4)
EOSINOPHIL NFR BLD AUTO: 2 %
ERYTHROCYTE [DISTWIDTH] IN BLOOD BY AUTOMATED COUNT: 12.5 % (ref 11.6–15.4)
GLOBULIN SER CALC-MCNC: 2.5 G/DL (ref 1.5–4.5)
GLUCOSE SERPL-MCNC: 84 MG/DL (ref 70–99)
HCT VFR BLD AUTO: 50.3 % (ref 37.5–51)
HGB BLD-MCNC: 17 G/DL (ref 13–17.7)
IMM GRANULOCYTES # BLD AUTO: 0 X10E3/UL (ref 0–0.1)
IMM GRANULOCYTES NFR BLD AUTO: 0 %
LYMPHOCYTES # BLD AUTO: 1 X10E3/UL (ref 0.7–3.1)
LYMPHOCYTES NFR BLD AUTO: 24 %
MCH RBC QN AUTO: 31.4 PG (ref 26.6–33)
MCHC RBC AUTO-ENTMCNC: 33.8 G/DL (ref 31.5–35.7)
MCV RBC AUTO: 93 FL (ref 79–97)
MONOCYTES # BLD AUTO: 0.5 X10E3/UL (ref 0.1–0.9)
MONOCYTES NFR BLD AUTO: 13 %
NEUTROPHILS # BLD AUTO: 2.6 X10E3/UL (ref 1.4–7)
NEUTROPHILS NFR BLD AUTO: 60 %
PLATELET # BLD AUTO: 258 X10E3/UL (ref 150–450)
POTASSIUM SERPL-SCNC: 4.1 MMOL/L (ref 3.5–5.2)
PROT SERPL-MCNC: 7.2 G/DL (ref 6–8.5)
RBC # BLD AUTO: 5.41 X10E6/UL (ref 4.14–5.8)
SODIUM SERPL-SCNC: 137 MMOL/L (ref 134–144)
WBC # BLD AUTO: 4.2 X10E3/UL (ref 3.4–10.8)

## 2024-06-03 ENCOUNTER — HOSPITAL ENCOUNTER (OUTPATIENT)
Facility: HOSPITAL | Age: 64
Discharge: HOME | End: 2024-06-06
Attending: ORTHOPAEDIC SURGERY
Payer: COMMERCIAL

## 2024-06-03 DIAGNOSIS — Z96.651 S/P TOTAL KNEE REPLACEMENT, RIGHT: ICD-10-CM

## 2024-06-03 PROCEDURE — 3430000000 HC RX DIAGNOSTIC RADIOPHARMACEUTICAL: Performed by: ORTHOPAEDIC SURGERY

## 2024-06-03 PROCEDURE — A9503 TC99M MEDRONATE: HCPCS | Performed by: ORTHOPAEDIC SURGERY

## 2024-06-03 PROCEDURE — 78300 BONE IMAGING LIMITED AREA: CPT

## 2024-06-03 RX ORDER — TC 99M MEDRONATE 20 MG/10ML
21.9 INJECTION, POWDER, LYOPHILIZED, FOR SOLUTION INTRAVENOUS
Status: COMPLETED | OUTPATIENT
Start: 2024-06-03 | End: 2024-06-03

## 2024-06-03 RX ADMIN — TC 99M MEDRONATE 21.9 MILLICURIE: 20 INJECTION, POWDER, LYOPHILIZED, FOR SOLUTION INTRAVENOUS at 09:35

## 2024-06-06 ENCOUNTER — OFFICE VISIT (OUTPATIENT)
Facility: CLINIC | Age: 64
End: 2024-06-06
Payer: COMMERCIAL

## 2024-06-06 VITALS
OXYGEN SATURATION: 96 % | TEMPERATURE: 97 F | BODY MASS INDEX: 31.42 KG/M2 | HEART RATE: 78 BPM | RESPIRATION RATE: 20 BRPM | WEIGHT: 232 LBS | SYSTOLIC BLOOD PRESSURE: 107 MMHG | HEIGHT: 72 IN | DIASTOLIC BLOOD PRESSURE: 74 MMHG

## 2024-06-06 DIAGNOSIS — Z96.659 INFECTION OF PROSTHETIC KNEE JOINT, SUBSEQUENT ENCOUNTER: Primary | ICD-10-CM

## 2024-06-06 DIAGNOSIS — T84.59XD INFECTION OF PROSTHETIC KNEE JOINT, SUBSEQUENT ENCOUNTER: ICD-10-CM

## 2024-06-06 DIAGNOSIS — Z96.659 INFECTION OF PROSTHETIC KNEE JOINT, SUBSEQUENT ENCOUNTER: ICD-10-CM

## 2024-06-06 DIAGNOSIS — T84.59XD INFECTION OF PROSTHETIC KNEE JOINT, SUBSEQUENT ENCOUNTER: Primary | ICD-10-CM

## 2024-06-06 PROCEDURE — 99214 OFFICE O/P EST MOD 30 MIN: CPT | Performed by: INTERNAL MEDICINE

## 2024-06-06 RX ORDER — RIFAMPIN 300 MG/1
300 CAPSULE ORAL DAILY
COMMUNITY
Start: 2024-03-11

## 2024-06-06 RX ORDER — DOXYCYCLINE HYCLATE 100 MG
100 TABLET ORAL DAILY
COMMUNITY

## 2024-06-06 ASSESSMENT — PATIENT HEALTH QUESTIONNAIRE - PHQ9
SUM OF ALL RESPONSES TO PHQ QUESTIONS 1-9: 0
SUM OF ALL RESPONSES TO PHQ QUESTIONS 1-9: 0
2. FEELING DOWN, DEPRESSED OR HOPELESS: NOT AT ALL
1. LITTLE INTEREST OR PLEASURE IN DOING THINGS: NOT AT ALL
SUM OF ALL RESPONSES TO PHQ QUESTIONS 1-9: 0
SUM OF ALL RESPONSES TO PHQ QUESTIONS 1-9: 0
SUM OF ALL RESPONSES TO PHQ9 QUESTIONS 1 & 2: 0

## 2024-06-06 NOTE — PROGRESS NOTES
Chief Complaint   Patient presents with    Follow-up     Right knee prosthetic joint infection  4.5 months of doxycycline plus rifampin- discuss lab results   And bone scan is in chart

## 2024-06-06 NOTE — PROGRESS NOTES
[facility] Progress Note  Salvador Wick DO  [phone] Office  [phone] Fax    6/6/2024      Assessment & Plan:     Right knee prosthetic joint infection.  Status post I&D with poly exchange 2/28/2024.  Surgical cultures growing oxacillin sensitive Staphylococcus xylosus.   Patient completed n 6 weeks of daptomycin and rifampin 4/14.  Now on an additional 4.5 months of doxycycline plus rifampin.  Patient still with significant right knee effusion and pain.  Bone scan was done 6/3/2024 which revealed nonspecific increased tracer activity corresponding to the femoral and tibial components of prosthesis.  Patient to follow-up with Dr. Diamond for possible aspiration of the joint.  Continue doxycycline and rifampin for now.  May need to adjust antibiotic plans pending on results of orthopedic surgery workup.  Patient given lab slips to repeat labs in 2 months and again in 3 months from now.  His PCP is scheduled to do labs next week.    Hypercalcemia.  Noted on last 2 sets of labs.  Patient given copy of lab slip and will follow-up with his PCP next week  Lymphopenia.  This has resolved.    Hyperglycemia.  Hemoglobin A1c 5.          Subjective:     No complaints    Objective:     Vitals: /74   Pulse 78   Temp 97 °F (36.1 °C) (Temporal)   Resp 20   Ht 1.829 m (6')   Wt 105.2 kg (232 lb)   SpO2 96%   BMI 31.46 kg/m²      Physical Examination:   General:  Alert, cooperative, no distress   Head:  Normocephalic, atraumatic.   Eyes:  Conjunctivae clear   Neck: Supple       Lungs:   No distress.     Chest wall:     Heart:     Abdomen:   non-distended   Extremities: Moves all.  Right knee effusion.  Mild warmth.  No erythema or tenderness   Skin: No acute rash on exposed skin   Neurologic: CNII-XII intact. Normal strength     Labs:        Invalid input(s): \"ITNL\"   No results for input(s): \"CPK\", \"CKMB\" in the last 72 hours.    Invalid input(s): \"TROIQ\"  No results for input(s):

## 2024-06-20 PROBLEM — F41.9 CHRONIC ANXIETY: Status: ACTIVE | Noted: 2017-11-22

## 2024-06-20 PROBLEM — G56.20 CUBITAL TUNNEL SYNDROME: Status: ACTIVE | Noted: 2019-12-04

## 2024-06-20 PROBLEM — E55.9 VITAMIN D DEFICIENCY: Status: ACTIVE | Noted: 2024-02-09

## 2024-06-20 PROBLEM — M25.511 PAIN IN JOINT OF RIGHT SHOULDER: Status: ACTIVE | Noted: 2021-01-04

## 2024-06-20 PROBLEM — M76.72 PERONEAL TENDINITIS OF LEFT LOWER EXTREMITY: Status: ACTIVE | Noted: 2021-10-25

## 2024-06-20 PROBLEM — S86.312D PERONEAL TENDON TEAR, LEFT, SUBSEQUENT ENCOUNTER: Status: ACTIVE | Noted: 2021-12-22

## 2024-06-20 PROBLEM — I10 BENIGN ESSENTIAL HYPERTENSION: Status: ACTIVE | Noted: 2021-12-16

## 2024-06-20 PROBLEM — M70.62 TROCHANTERIC BURSITIS OF LEFT HIP: Status: ACTIVE | Noted: 2020-06-16

## 2024-06-20 PROBLEM — G47.00 INSOMNIA: Status: ACTIVE | Noted: 2021-12-22

## 2024-06-20 PROBLEM — M76.62 ACHILLES TENDINITIS, LEFT LEG: Status: ACTIVE | Noted: 2019-04-09

## 2024-06-20 PROBLEM — M25.559 GREATER TROCHANTERIC PAIN SYNDROME: Status: ACTIVE | Noted: 2024-02-09

## 2024-06-20 PROBLEM — M51.36 DEGENERATION OF LUMBAR INTERVERTEBRAL DISC: Status: ACTIVE | Noted: 2018-01-22

## 2024-06-20 PROBLEM — M54.30 SCIATICA: Status: ACTIVE | Noted: 2017-08-23

## 2024-06-20 PROBLEM — R03.0 ELEVATED BLOOD-PRESSURE READING WITHOUT DIAGNOSIS OF HYPERTENSION: Status: ACTIVE | Noted: 2021-06-17

## 2024-06-20 PROBLEM — M51.369 DEGENERATION OF LUMBAR INTERVERTEBRAL DISC: Status: ACTIVE | Noted: 2018-01-22

## 2024-06-20 PROBLEM — M25.512 PAIN IN JOINT OF LEFT SHOULDER: Status: ACTIVE | Noted: 2021-03-16

## 2024-06-20 PROBLEM — M25.561 ARTHRALGIA OF RIGHT KNEE: Status: ACTIVE | Noted: 2022-05-31

## 2024-06-20 PROBLEM — H33.23 BILATERAL RETINAL DETACHMENT: Status: ACTIVE | Noted: 2021-12-22

## 2024-08-01 LAB
ALBUMIN SERPL-MCNC: 4.6 G/DL (ref 3.9–4.9)
ALP SERPL-CCNC: 69 IU/L (ref 44–121)
ALT SERPL-CCNC: 21 IU/L (ref 0–44)
AST SERPL-CCNC: 19 IU/L (ref 0–40)
BASOPHILS # BLD AUTO: 0 X10E3/UL (ref 0–0.2)
BASOPHILS NFR BLD AUTO: 1 %
BILIRUB SERPL-MCNC: 0.3 MG/DL (ref 0–1.2)
BUN SERPL-MCNC: 29 MG/DL (ref 8–27)
BUN/CREAT SERPL: 28 (ref 10–24)
CALCIUM SERPL-MCNC: 10 MG/DL (ref 8.6–10.2)
CHLORIDE SERPL-SCNC: 98 MMOL/L (ref 96–106)
CO2 SERPL-SCNC: 23 MMOL/L (ref 20–29)
CREAT SERPL-MCNC: 1.04 MG/DL (ref 0.76–1.27)
CRP SERPL-MCNC: <1 MG/L (ref 0–10)
EGFRCR SERPLBLD CKD-EPI 2021: 81 ML/MIN/1.73
EOSINOPHIL # BLD AUTO: 0.1 X10E3/UL (ref 0–0.4)
EOSINOPHIL NFR BLD AUTO: 4 %
ERYTHROCYTE [DISTWIDTH] IN BLOOD BY AUTOMATED COUNT: 12.7 % (ref 11.6–15.4)
GLOBULIN SER CALC-MCNC: 2.4 G/DL (ref 1.5–4.5)
GLUCOSE SERPL-MCNC: 91 MG/DL (ref 70–99)
HCT VFR BLD AUTO: 47.7 % (ref 37.5–51)
HGB BLD-MCNC: 15.7 G/DL (ref 13–17.7)
IMM GRANULOCYTES # BLD AUTO: 0 X10E3/UL (ref 0–0.1)
IMM GRANULOCYTES NFR BLD AUTO: 0 %
LYMPHOCYTES # BLD AUTO: 0.8 X10E3/UL (ref 0.7–3.1)
LYMPHOCYTES NFR BLD AUTO: 21 %
MCH RBC QN AUTO: 30.6 PG (ref 26.6–33)
MCHC RBC AUTO-ENTMCNC: 32.9 G/DL (ref 31.5–35.7)
MCV RBC AUTO: 93 FL (ref 79–97)
MONOCYTES # BLD AUTO: 0.5 X10E3/UL (ref 0.1–0.9)
MONOCYTES NFR BLD AUTO: 14 %
NEUTROPHILS # BLD AUTO: 2.4 X10E3/UL (ref 1.4–7)
NEUTROPHILS NFR BLD AUTO: 60 %
PLATELET # BLD AUTO: 234 X10E3/UL (ref 150–450)
POTASSIUM SERPL-SCNC: 4.7 MMOL/L (ref 3.5–5.2)
PROT SERPL-MCNC: 7 G/DL (ref 6–8.5)
RBC # BLD AUTO: 5.13 X10E6/UL (ref 4.14–5.8)
SODIUM SERPL-SCNC: 139 MMOL/L (ref 134–144)
WBC # BLD AUTO: 4 X10E3/UL (ref 3.4–10.8)

## 2024-08-22 RX ORDER — RIFAMPIN 300 MG/1
300 CAPSULE ORAL DAILY
Qty: 90 CAPSULE | Refills: 0 | Status: SHIPPED | OUTPATIENT
Start: 2024-08-22

## 2024-10-04 LAB
ALBUMIN SERPL-MCNC: 4.7 G/DL (ref 3.9–4.9)
ALP SERPL-CCNC: 71 IU/L (ref 44–121)
ALT SERPL-CCNC: 17 IU/L (ref 0–44)
AST SERPL-CCNC: 21 IU/L (ref 0–40)
BASOPHILS # BLD AUTO: 0 X10E3/UL (ref 0–0.2)
BASOPHILS NFR BLD AUTO: 0 %
BILIRUB SERPL-MCNC: 0.3 MG/DL (ref 0–1.2)
BUN SERPL-MCNC: 28 MG/DL (ref 8–27)
BUN/CREAT SERPL: 27 (ref 10–24)
CALCIUM SERPL-MCNC: 10.1 MG/DL (ref 8.6–10.2)
CHLORIDE SERPL-SCNC: 98 MMOL/L (ref 96–106)
CO2 SERPL-SCNC: 27 MMOL/L (ref 20–29)
CREAT SERPL-MCNC: 1.04 MG/DL (ref 0.76–1.27)
EGFRCR SERPLBLD CKD-EPI 2021: 81 ML/MIN/1.73
EOSINOPHIL # BLD AUTO: 0.2 X10E3/UL (ref 0–0.4)
EOSINOPHIL NFR BLD AUTO: 4 %
ERYTHROCYTE [DISTWIDTH] IN BLOOD BY AUTOMATED COUNT: 11.8 % (ref 11.6–15.4)
GLOBULIN SER CALC-MCNC: 2.5 G/DL (ref 1.5–4.5)
GLUCOSE SERPL-MCNC: 81 MG/DL (ref 70–99)
HCT VFR BLD AUTO: 49.1 % (ref 37.5–51)
HGB BLD-MCNC: 16.2 G/DL (ref 13–17.7)
IMM GRANULOCYTES # BLD AUTO: 0 X10E3/UL (ref 0–0.1)
IMM GRANULOCYTES NFR BLD AUTO: 0 %
LYMPHOCYTES # BLD AUTO: 1 X10E3/UL (ref 0.7–3.1)
LYMPHOCYTES NFR BLD AUTO: 20 %
MCH RBC QN AUTO: 32.2 PG (ref 26.6–33)
MCHC RBC AUTO-ENTMCNC: 33 G/DL (ref 31.5–35.7)
MCV RBC AUTO: 98 FL (ref 79–97)
MONOCYTES # BLD AUTO: 0.6 X10E3/UL (ref 0.1–0.9)
MONOCYTES NFR BLD AUTO: 12 %
NEUTROPHILS # BLD AUTO: 3.4 X10E3/UL (ref 1.4–7)
NEUTROPHILS NFR BLD AUTO: 64 %
PLATELET # BLD AUTO: 270 X10E3/UL (ref 150–450)
POTASSIUM SERPL-SCNC: 4.8 MMOL/L (ref 3.5–5.2)
PROT SERPL-MCNC: 7.2 G/DL (ref 6–8.5)
RBC # BLD AUTO: 5.03 X10E6/UL (ref 4.14–5.8)
SODIUM SERPL-SCNC: 139 MMOL/L (ref 134–144)
WBC # BLD AUTO: 5.2 X10E3/UL (ref 3.4–10.8)

## 2024-10-08 LAB — CRP SERPL-MCNC: 1 MG/L (ref 0–10)

## 2024-10-24 ENCOUNTER — HOSPITAL ENCOUNTER (OUTPATIENT)
Facility: HOSPITAL | Age: 64
Discharge: HOME | End: 2024-10-27
Payer: COMMERCIAL

## 2024-10-24 VITALS
WEIGHT: 231 LBS | OXYGEN SATURATION: 92 % | SYSTOLIC BLOOD PRESSURE: 114 MMHG | HEART RATE: 89 BPM | BODY MASS INDEX: 31.29 KG/M2 | DIASTOLIC BLOOD PRESSURE: 78 MMHG | HEIGHT: 72 IN | RESPIRATION RATE: 20 BRPM | TEMPERATURE: 97.7 F

## 2024-10-24 LAB
ABO + RH BLD: NORMAL
ALBUMIN SERPL-MCNC: 4.3 G/DL (ref 3.5–5)
ALBUMIN/GLOB SERPL: 1.4 (ref 1.1–2.2)
ALP SERPL-CCNC: 67 U/L (ref 45–117)
ALT SERPL-CCNC: 30 U/L (ref 12–78)
ANION GAP SERPL CALC-SCNC: 5 MMOL/L (ref 2–12)
APPEARANCE UR: CLEAR
APTT PPP: 25.5 SEC (ref 22.1–31)
AST SERPL-CCNC: 20 U/L (ref 15–37)
BACTERIA URNS QL MICRO: NEGATIVE /HPF
BASOPHILS # BLD: 0 K/UL (ref 0–0.1)
BASOPHILS NFR BLD: 1 % (ref 0–1)
BILIRUB SERPL-MCNC: 0.5 MG/DL (ref 0.2–1)
BILIRUB UR QL: NEGATIVE
BLOOD GROUP ANTIBODIES SERPL: NORMAL
BUN SERPL-MCNC: 27 MG/DL (ref 6–20)
BUN/CREAT SERPL: 25 (ref 12–20)
CALCIUM SERPL-MCNC: 9.8 MG/DL (ref 8.5–10.1)
CHLORIDE SERPL-SCNC: 101 MMOL/L (ref 97–108)
CO2 SERPL-SCNC: 31 MMOL/L (ref 21–32)
COLOR UR: ABNORMAL
CREAT SERPL-MCNC: 1.08 MG/DL (ref 0.7–1.3)
CRP SERPL-MCNC: <0.29 MG/DL (ref 0–0.3)
DIFFERENTIAL METHOD BLD: NORMAL
EOSINOPHIL # BLD: 0.1 K/UL (ref 0–0.4)
EOSINOPHIL NFR BLD: 3 % (ref 0–7)
EPITH CASTS URNS QL MICRO: ABNORMAL /LPF
ERYTHROCYTE [DISTWIDTH] IN BLOOD BY AUTOMATED COUNT: 11.7 % (ref 11.5–14.5)
ERYTHROCYTE [SEDIMENTATION RATE] IN BLOOD: 7 MM/HR (ref 0–20)
EST. AVERAGE GLUCOSE BLD GHB EST-MCNC: 94 MG/DL
GLOBULIN SER CALC-MCNC: 3.1 G/DL (ref 2–4)
GLUCOSE SERPL-MCNC: 89 MG/DL (ref 65–100)
GLUCOSE UR STRIP.AUTO-MCNC: NEGATIVE MG/DL
HBA1C MFR BLD: 4.9 % (ref 4–5.6)
HCT VFR BLD AUTO: 46.6 % (ref 36.6–50.3)
HGB BLD-MCNC: 16.4 G/DL (ref 12.1–17)
HGB UR QL STRIP: ABNORMAL
HYALINE CASTS URNS QL MICRO: ABNORMAL /LPF (ref 0–2)
IMM GRANULOCYTES # BLD AUTO: 0 K/UL (ref 0–0.04)
IMM GRANULOCYTES NFR BLD AUTO: 0 % (ref 0–0.5)
INR PPP: 1 (ref 0.9–1.1)
KETONES UR QL STRIP.AUTO: NEGATIVE MG/DL
LEUKOCYTE ESTERASE UR QL STRIP.AUTO: NEGATIVE
LYMPHOCYTES # BLD: 0.9 K/UL (ref 0.8–3.5)
LYMPHOCYTES NFR BLD: 19 % (ref 12–49)
MCH RBC QN AUTO: 32.5 PG (ref 26–34)
MCHC RBC AUTO-ENTMCNC: 35.2 G/DL (ref 30–36.5)
MCV RBC AUTO: 92.5 FL (ref 80–99)
MONOCYTES # BLD: 0.6 K/UL (ref 0–1)
MONOCYTES NFR BLD: 12 % (ref 5–13)
NEUTS SEG # BLD: 3.2 K/UL (ref 1.8–8)
NEUTS SEG NFR BLD: 65 % (ref 32–75)
NITRITE UR QL STRIP.AUTO: NEGATIVE
NRBC # BLD: 0 K/UL (ref 0–0.01)
NRBC BLD-RTO: 0 PER 100 WBC
PH UR STRIP: 6 (ref 5–8)
PLATELET # BLD AUTO: 260 K/UL (ref 150–400)
PMV BLD AUTO: 9.6 FL (ref 8.9–12.9)
POTASSIUM SERPL-SCNC: 3.8 MMOL/L (ref 3.5–5.1)
PROT SERPL-MCNC: 7.4 G/DL (ref 6.4–8.2)
PROT UR STRIP-MCNC: NEGATIVE MG/DL
PROTHROMBIN TIME: 10.4 SEC (ref 9–11.1)
RBC # BLD AUTO: 5.04 M/UL (ref 4.1–5.7)
RBC #/AREA URNS HPF: ABNORMAL /HPF (ref 0–5)
SODIUM SERPL-SCNC: 137 MMOL/L (ref 136–145)
SP GR UR REFRACTOMETRY: 1.02 (ref 1–1.03)
SPECIMEN EXP DATE BLD: NORMAL
THERAPEUTIC RANGE: NORMAL SECS (ref 58–77)
URINE CULTURE IF INDICATED: ABNORMAL
UROBILINOGEN UR QL STRIP.AUTO: 1 EU/DL (ref 0.2–1)
WBC # BLD AUTO: 5 K/UL (ref 4.1–11.1)
WBC URNS QL MICRO: ABNORMAL /HPF (ref 0–4)

## 2024-10-24 PROCEDURE — 93005 ELECTROCARDIOGRAM TRACING: CPT | Performed by: ORTHOPAEDIC SURGERY

## 2024-10-24 PROCEDURE — 86900 BLOOD TYPING SEROLOGIC ABO: CPT

## 2024-10-24 PROCEDURE — 84466 ASSAY OF TRANSFERRIN: CPT

## 2024-10-24 PROCEDURE — 86140 C-REACTIVE PROTEIN: CPT

## 2024-10-24 PROCEDURE — 86850 RBC ANTIBODY SCREEN: CPT

## 2024-10-24 PROCEDURE — 80053 COMPREHEN METABOLIC PANEL: CPT

## 2024-10-24 PROCEDURE — 36415 COLL VENOUS BLD VENIPUNCTURE: CPT

## 2024-10-24 PROCEDURE — 85025 COMPLETE CBC W/AUTO DIFF WBC: CPT

## 2024-10-24 PROCEDURE — 86901 BLOOD TYPING SEROLOGIC RH(D): CPT

## 2024-10-24 PROCEDURE — 85652 RBC SED RATE AUTOMATED: CPT

## 2024-10-24 PROCEDURE — 85610 PROTHROMBIN TIME: CPT

## 2024-10-24 PROCEDURE — 85730 THROMBOPLASTIN TIME PARTIAL: CPT

## 2024-10-24 PROCEDURE — 81001 URINALYSIS AUTO W/SCOPE: CPT

## 2024-10-24 PROCEDURE — 83036 HEMOGLOBIN GLYCOSYLATED A1C: CPT

## 2024-10-24 ASSESSMENT — PAIN DESCRIPTION - DESCRIPTORS: DESCRIPTORS: ACHING;SHARP

## 2024-10-24 ASSESSMENT — PAIN DESCRIPTION - ORIENTATION: ORIENTATION: RIGHT

## 2024-10-24 ASSESSMENT — PAIN SCALES - GENERAL: PAINLEVEL_OUTOF10: 6

## 2024-10-24 ASSESSMENT — PAIN DESCRIPTION - PAIN TYPE: TYPE: ACUTE PAIN

## 2024-10-24 ASSESSMENT — PAIN DESCRIPTION - LOCATION: LOCATION: KNEE

## 2024-10-24 NOTE — DISCHARGE INSTRUCTIONS
you during PAT appointment. Begin 3 days prior to surgery.  Do not shave or trim anywhere 24 hours before surgery  Wear your hair loose or down; no pony-tails, buns, or metal hair clips  Wear loose, comfortable, clean clothes  Wear glasses instead of contacts. Bring a case to keep your glasses safe.  Leave money, valuables, and jewelry, including body piercings, at home  If you were given an Incentive Spirometer, bring it on day of surgery.  If you use inhalers or CPAP machine, bring it with you the day of surgery.     Going Home - or Spending the Night SAME-DAY SURGERY: You must have a responsible adult drive you home and stay with you 24 hours after surgery. You may not drive for 24 hours after surgery.    ADMITS: If your doctor is keeping you in the hospital after surgery, leave personal belongings/luggage in your car until you have a hospital room number. Your family member/ will be notified of your room number before you leave the recovery room.    Hospital discharge time is 12 noon  Drivers must be here before 12 noon unless you are told differently   Special Instructions Free  parking available from 7 AM until 5 PM.  Watch online class     Follow all instructions so your surgery won’t be cancelled.  Please, be on time.                    If a situation occurs and you are delayed the day of surgery, call [phone].    If your physical condition changes (like a fever, cold, flu, etc.) call your surgeon.    Home medication(s) reviewed and verified via LIst during PAT appointment.    The patient was contacted by  IN-PERSON.  The patient verbalizes understanding of all instructions and DOES NOT need reinforcement.

## 2024-10-25 LAB
BACTERIA SPEC CULT: NORMAL
BACTERIA SPEC CULT: NORMAL
EKG ATRIAL RATE: 79 BPM
EKG DIAGNOSIS: NORMAL
EKG P AXIS: 57 DEGREES
EKG P-R INTERVAL: 170 MS
EKG Q-T INTERVAL: 380 MS
EKG QRS DURATION: 96 MS
EKG QTC CALCULATION (BAZETT): 435 MS
EKG R AXIS: 18 DEGREES
EKG T AXIS: 62 DEGREES
EKG VENTRICULAR RATE: 79 BPM
SERVICE CMNT-IMP: NORMAL

## 2024-10-25 PROCEDURE — 93010 ELECTROCARDIOGRAM REPORT: CPT | Performed by: INTERNAL MEDICINE

## 2024-10-28 NOTE — PERIOP NOTE
Labs and EKG faxed via EMR to Dr. Tee for pre operative history and physical as requested by patient.

## 2024-10-29 LAB — TRANSFERRIN SERPL-MCNC: 247 MG/DL (ref 177–329)

## 2024-11-05 NOTE — PRE-PROCEDURE INSTRUCTIONS
The patient was provided a virtual link to view the pre-operative Joint Replacement Class Video  A Patient Education Book specific to total hip or knee joint replacement surgery was given to the patient in formerly [facility].  The content of the class was presented using an audio power point presentation specific for patients undergoing total hip and knee replacement surgery.    Incentive spirometer and CHG bath kits were verbally reviewed.   Day of surgery routine and expectations, hospital routine and expectations, nutrition, alcohol, nicotine, medications, infection control, pain management, DVT precautions and equipment, ice therapy, durable medical equipment, exercises, mobility expectations and precautions, home preparation and safety were reviewed in class video.   My contact information was shared with the patient to provide further information as requested by the patient related to their upcoming surgery.   Received confirmation that the patient and  viewed joint class online via the [employer] pre-op education video survey with quiz.

## 2024-11-07 ENCOUNTER — APPOINTMENT (OUTPATIENT)
Facility: HOSPITAL | Age: 64
End: 2024-11-07
Attending: ORTHOPAEDIC SURGERY
Payer: COMMERCIAL

## 2024-11-07 ENCOUNTER — HOSPITAL ENCOUNTER (OUTPATIENT)
Facility: HOSPITAL | Age: 64
Setting detail: OBSERVATION
Discharge: HOME | End: 2024-11-08
Attending: ORTHOPAEDIC SURGERY | Admitting: ORTHOPAEDIC SURGERY
Payer: COMMERCIAL

## 2024-11-07 ENCOUNTER — ANESTHESIA (OUTPATIENT)
Facility: HOSPITAL | Age: 64
End: 2024-11-07
Payer: COMMERCIAL

## 2024-11-07 ENCOUNTER — ANESTHESIA EVENT (OUTPATIENT)
Facility: HOSPITAL | Age: 64
End: 2024-11-07
Payer: COMMERCIAL

## 2024-11-07 DIAGNOSIS — T84.012D FAILED TOTAL RIGHT KNEE REPLACEMENT, SUBSEQUENT ENCOUNTER: ICD-10-CM

## 2024-11-07 DIAGNOSIS — T84.012S FAILED TOTAL KNEE, RIGHT, SEQUELA: Primary | ICD-10-CM

## 2024-11-07 LAB
APPEARANCE SNV: ABNORMAL
COLOR SNV: YELLOW
LYMPHOCYTES NFR SNV MANUAL: 44 % (ref 0–15)
MONOCYTES NFR SNV MANUAL: 27 % (ref 0–65)
NEUTROPHILS NFR SNV MANUAL: 16 % (ref 0–20)
RBC # SNV: >100 /CU MM
SPECIMEN SOURCE FLD: ABNORMAL
WBC # SNV: 130 /CU MM (ref 0–150)
WBC OTHER NFR FLD MANUAL: 13

## 2024-11-07 PROCEDURE — 89050 BODY FLUID CELL COUNT: CPT

## 2024-11-07 PROCEDURE — 87070 CULTURE OTHR SPECIMN AEROBIC: CPT

## 2024-11-07 PROCEDURE — 7100000001 HC PACU RECOVERY - ADDTL 15 MIN: Performed by: ORTHOPAEDIC SURGERY

## 2024-11-07 PROCEDURE — 6360000002 HC RX W HCPCS: Performed by: STUDENT IN AN ORGANIZED HEALTH CARE EDUCATION/TRAINING PROGRAM

## 2024-11-07 PROCEDURE — C1713 ANCHOR/SCREW BN/BN,TIS/BN: HCPCS | Performed by: ORTHOPAEDIC SURGERY

## 2024-11-07 PROCEDURE — 6360000002 HC RX W HCPCS: Performed by: PHYSICIAN ASSISTANT

## 2024-11-07 PROCEDURE — 3700000000 HC ANESTHESIA ATTENDED CARE: Performed by: ORTHOPAEDIC SURGERY

## 2024-11-07 PROCEDURE — 2500000003 HC RX 250 WO HCPCS: Performed by: NURSE ANESTHETIST, CERTIFIED REGISTERED

## 2024-11-07 PROCEDURE — 73560 X-RAY EXAM OF KNEE 1 OR 2: CPT

## 2024-11-07 PROCEDURE — 6360000002 HC RX W HCPCS: Performed by: ORTHOPAEDIC SURGERY

## 2024-11-07 PROCEDURE — 3600000005 HC SURGERY LEVEL 5 BASE: Performed by: ORTHOPAEDIC SURGERY

## 2024-11-07 PROCEDURE — 2580000003 HC RX 258: Performed by: ANESTHESIOLOGY

## 2024-11-07 PROCEDURE — 2580000003 HC RX 258: Performed by: PHYSICIAN ASSISTANT

## 2024-11-07 PROCEDURE — 6360000002 HC RX W HCPCS: Performed by: ANESTHESIOLOGY

## 2024-11-07 PROCEDURE — 3600000015 HC SURGERY LEVEL 5 ADDTL 15MIN: Performed by: ORTHOPAEDIC SURGERY

## 2024-11-07 PROCEDURE — 64999 UNLISTED PX NERVOUS SYSTEM: CPT | Performed by: ANESTHESIOLOGY

## 2024-11-07 PROCEDURE — G0378 HOSPITAL OBSERVATION PER HR: HCPCS

## 2024-11-07 PROCEDURE — 3700000001 HC ADD 15 MINUTES (ANESTHESIA): Performed by: ORTHOPAEDIC SURGERY

## 2024-11-07 PROCEDURE — 6370000000 HC RX 637 (ALT 250 FOR IP): Performed by: ANESTHESIOLOGY

## 2024-11-07 PROCEDURE — 2500000003 HC RX 250 WO HCPCS: Performed by: ORTHOPAEDIC SURGERY

## 2024-11-07 PROCEDURE — 2709999900 HC NON-CHARGEABLE SUPPLY: Performed by: ORTHOPAEDIC SURGERY

## 2024-11-07 PROCEDURE — 6360000002 HC RX W HCPCS: Performed by: NURSE ANESTHETIST, CERTIFIED REGISTERED

## 2024-11-07 PROCEDURE — A4217 STERILE WATER/SALINE, 500 ML: HCPCS | Performed by: ORTHOPAEDIC SURGERY

## 2024-11-07 PROCEDURE — 2580000003 HC RX 258: Performed by: NURSE ANESTHETIST, CERTIFIED REGISTERED

## 2024-11-07 PROCEDURE — 6370000000 HC RX 637 (ALT 250 FOR IP): Performed by: PHYSICIAN ASSISTANT

## 2024-11-07 PROCEDURE — 87075 CULTR BACTERIA EXCEPT BLOOD: CPT

## 2024-11-07 PROCEDURE — 87205 SMEAR GRAM STAIN: CPT

## 2024-11-07 PROCEDURE — 7100000000 HC PACU RECOVERY - FIRST 15 MIN: Performed by: ORTHOPAEDIC SURGERY

## 2024-11-07 PROCEDURE — 2580000003 HC RX 258: Performed by: ORTHOPAEDIC SURGERY

## 2024-11-07 PROCEDURE — C1776 JOINT DEVICE (IMPLANTABLE): HCPCS | Performed by: ORTHOPAEDIC SURGERY

## 2024-11-07 PROCEDURE — 6370000000 HC RX 637 (ALT 250 FOR IP): Performed by: ORTHOPAEDIC SURGERY

## 2024-11-07 PROCEDURE — 2720000010 HC SURG SUPPLY STERILE: Performed by: ORTHOPAEDIC SURGERY

## 2024-11-07 DEVICE — TOTAL STABILIZER+ TIBIAL INSERT
Type: IMPLANTABLE DEVICE | Site: KNEE | Status: FUNCTIONAL
Brand: TRIATHLON

## 2024-11-07 DEVICE — TOTAL STABILIZER FEMORAL COMPONENT
Type: IMPLANTABLE DEVICE | Site: KNEE | Status: FUNCTIONAL
Brand: TRIATHLON

## 2024-11-07 DEVICE — TIBIAL AUGMENT HALF BLOCK
Type: IMPLANTABLE DEVICE | Site: KNEE | Status: FUNCTIONAL
Brand: TRIATHLON

## 2024-11-07 DEVICE — CEMENT BNE 40GM FULL DOSE PMMA W/ GENT HI VISC SIMPLEX P 10: Type: IMPLANTABLE DEVICE | Site: KNEE | Status: FUNCTIONAL

## 2024-11-07 DEVICE — FEMORAL DISTAL AUGMENT
Type: IMPLANTABLE DEVICE | Site: KNEE | Status: FUNCTIONAL
Brand: TRIATHLON

## 2024-11-07 DEVICE — CEMENTED STEM
Type: IMPLANTABLE DEVICE | Site: KNEE | Status: FUNCTIONAL
Brand: TRIATHLON

## 2024-11-07 DEVICE — UNIVERSAL TIBIAL BASEPLATE
Type: IMPLANTABLE DEVICE | Site: KNEE | Status: FUNCTIONAL
Brand: TRIATHLON

## 2024-11-07 RX ORDER — ONDANSETRON 4 MG/1
4 TABLET, ORALLY DISINTEGRATING ORAL EVERY 8 HOURS PRN
Status: DISCONTINUED | OUTPATIENT
Start: 2024-11-07 | End: 2024-11-08 | Stop reason: HOSPADM

## 2024-11-07 RX ORDER — TRAMADOL HYDROCHLORIDE 50 MG/1
50 TABLET ORAL EVERY 6 HOURS PRN
Qty: 30 TABLET | Refills: 0 | Status: SHIPPED | OUTPATIENT
Start: 2024-11-07 | End: 2024-11-14

## 2024-11-07 RX ORDER — ASPIRIN 81 MG/1
81 TABLET ORAL 2 TIMES DAILY
Qty: 60 TABLET | Refills: 3 | Status: SHIPPED | OUTPATIENT
Start: 2024-11-07

## 2024-11-07 RX ORDER — GLYCOPYRROLATE 0.2 MG/ML
INJECTION INTRAMUSCULAR; INTRAVENOUS
Status: DISCONTINUED | OUTPATIENT
Start: 2024-11-07 | End: 2024-11-07 | Stop reason: SDUPTHER

## 2024-11-07 RX ORDER — DIPHENHYDRAMINE HYDROCHLORIDE 50 MG/ML
25 INJECTION INTRAMUSCULAR; INTRAVENOUS EVERY 6 HOURS PRN
Status: DISCONTINUED | OUTPATIENT
Start: 2024-11-07 | End: 2024-11-08 | Stop reason: HOSPADM

## 2024-11-07 RX ORDER — SODIUM CHLORIDE, SODIUM LACTATE, POTASSIUM CHLORIDE, CALCIUM CHLORIDE 600; 310; 30; 20 MG/100ML; MG/100ML; MG/100ML; MG/100ML
INJECTION, SOLUTION INTRAVENOUS
Status: DISCONTINUED | OUTPATIENT
Start: 2024-11-07 | End: 2024-11-07 | Stop reason: SDUPTHER

## 2024-11-07 RX ORDER — ONDANSETRON 2 MG/ML
4 INJECTION INTRAMUSCULAR; INTRAVENOUS EVERY 6 HOURS PRN
Status: DISCONTINUED | OUTPATIENT
Start: 2024-11-07 | End: 2024-11-08 | Stop reason: HOSPADM

## 2024-11-07 RX ORDER — MIDAZOLAM HYDROCHLORIDE 1 MG/ML
INJECTION, SOLUTION INTRAMUSCULAR; INTRAVENOUS
Status: DISCONTINUED | OUTPATIENT
Start: 2024-11-07 | End: 2024-11-07 | Stop reason: SDUPTHER

## 2024-11-07 RX ORDER — TRAZODONE HYDROCHLORIDE 100 MG/1
100 TABLET ORAL NIGHTLY
Status: DISCONTINUED | OUTPATIENT
Start: 2024-11-07 | End: 2024-11-08 | Stop reason: HOSPADM

## 2024-11-07 RX ORDER — POLYETHYLENE GLYCOL 3350 17 G/17G
17 POWDER, FOR SOLUTION ORAL DAILY
Status: DISCONTINUED | OUTPATIENT
Start: 2024-11-07 | End: 2024-11-08 | Stop reason: HOSPADM

## 2024-11-07 RX ORDER — ONDANSETRON 2 MG/ML
INJECTION INTRAMUSCULAR; INTRAVENOUS
Status: DISCONTINUED | OUTPATIENT
Start: 2024-11-07 | End: 2024-11-07 | Stop reason: SDUPTHER

## 2024-11-07 RX ORDER — SODIUM CHLORIDE 9 MG/ML
INJECTION, SOLUTION INTRAVENOUS CONTINUOUS
Status: DISCONTINUED | OUTPATIENT
Start: 2024-11-07 | End: 2024-11-07 | Stop reason: HOSPADM

## 2024-11-07 RX ORDER — IPRATROPIUM BROMIDE AND ALBUTEROL SULFATE 2.5; .5 MG/3ML; MG/3ML
1 SOLUTION RESPIRATORY (INHALATION)
Status: DISCONTINUED | OUTPATIENT
Start: 2024-11-07 | End: 2024-11-07 | Stop reason: HOSPADM

## 2024-11-07 RX ORDER — ROCURONIUM BROMIDE 10 MG/ML
INJECTION, SOLUTION INTRAVENOUS
Status: DISCONTINUED | OUTPATIENT
Start: 2024-11-07 | End: 2024-11-07 | Stop reason: SDUPTHER

## 2024-11-07 RX ORDER — CEFADROXIL 500 MG/1
500 CAPSULE ORAL 2 TIMES DAILY
Qty: 20 CAPSULE | Refills: 0 | Status: SHIPPED | OUTPATIENT
Start: 2024-11-07 | End: 2024-11-08

## 2024-11-07 RX ORDER — DIPHENHYDRAMINE HCL 25 MG
25 CAPSULE ORAL EVERY 6 HOURS PRN
Status: DISCONTINUED | OUTPATIENT
Start: 2024-11-07 | End: 2024-11-08 | Stop reason: HOSPADM

## 2024-11-07 RX ORDER — ATORVASTATIN CALCIUM 20 MG/1
10 TABLET, FILM COATED ORAL DAILY
Status: DISCONTINUED | OUTPATIENT
Start: 2024-11-07 | End: 2024-11-08 | Stop reason: HOSPADM

## 2024-11-07 RX ORDER — HYDROMORPHONE HYDROCHLORIDE 1 MG/ML
1 INJECTION, SOLUTION INTRAMUSCULAR; INTRAVENOUS; SUBCUTANEOUS EVERY 5 MIN PRN
Status: DISCONTINUED | OUTPATIENT
Start: 2024-11-07 | End: 2024-11-07 | Stop reason: HOSPADM

## 2024-11-07 RX ORDER — LIDOCAINE HYDROCHLORIDE 20 MG/ML
INJECTION, SOLUTION EPIDURAL; INFILTRATION; INTRACAUDAL; PERINEURAL
Status: DISCONTINUED | OUTPATIENT
Start: 2024-11-07 | End: 2024-11-07 | Stop reason: SDUPTHER

## 2024-11-07 RX ORDER — MAGNESIUM HYDROXIDE 1200 MG/15ML
LIQUID ORAL CONTINUOUS PRN
Status: DISCONTINUED | OUTPATIENT
Start: 2024-11-07 | End: 2024-11-07 | Stop reason: HOSPADM

## 2024-11-07 RX ORDER — SODIUM CHLORIDE 0.9 % (FLUSH) 0.9 %
5-40 SYRINGE (ML) INJECTION PRN
Status: DISCONTINUED | OUTPATIENT
Start: 2024-11-07 | End: 2024-11-08 | Stop reason: HOSPADM

## 2024-11-07 RX ORDER — PREGABALIN 75 MG/1
75 CAPSULE ORAL ONCE
Status: COMPLETED | OUTPATIENT
Start: 2024-11-07 | End: 2024-11-07

## 2024-11-07 RX ORDER — PHENYLEPHRINE HYDROCHLORIDE 10 MG/ML
INJECTION INTRAVENOUS
Status: DISCONTINUED | OUTPATIENT
Start: 2024-11-07 | End: 2024-11-07 | Stop reason: SDUPTHER

## 2024-11-07 RX ORDER — BUPIVACAINE HYDROCHLORIDE AND EPINEPHRINE 5; .0091 MG/ML; MG/ML
INJECTION, SOLUTION DENTAL; INFILTRATION PRN
Status: DISCONTINUED | OUTPATIENT
Start: 2024-11-07 | End: 2024-11-07 | Stop reason: HOSPADM

## 2024-11-07 RX ORDER — OXYCODONE HYDROCHLORIDE 5 MG/1
5 TABLET ORAL EVERY 4 HOURS PRN
Qty: 42 TABLET | Refills: 0 | Status: SHIPPED | OUTPATIENT
Start: 2024-11-07 | End: 2024-11-08 | Stop reason: HOSPADM

## 2024-11-07 RX ORDER — IBUPROFEN 800 MG/1
800 TABLET, FILM COATED ORAL EVERY 8 HOURS PRN
Qty: 60 TABLET | Refills: 0 | Status: SHIPPED | OUTPATIENT
Start: 2024-11-07

## 2024-11-07 RX ORDER — BISACODYL 5 MG/1
5 TABLET, DELAYED RELEASE ORAL DAILY PRN
Status: DISCONTINUED | OUTPATIENT
Start: 2024-11-07 | End: 2024-11-08 | Stop reason: HOSPADM

## 2024-11-07 RX ORDER — DIPHENHYDRAMINE HYDROCHLORIDE 50 MG/ML
12.5 INJECTION INTRAMUSCULAR; INTRAVENOUS
Status: DISCONTINUED | OUTPATIENT
Start: 2024-11-07 | End: 2024-11-07 | Stop reason: HOSPADM

## 2024-11-07 RX ORDER — SODIUM CHLORIDE 9 MG/ML
INJECTION, SOLUTION INTRAVENOUS CONTINUOUS
Status: DISCONTINUED | OUTPATIENT
Start: 2024-11-07 | End: 2024-11-08 | Stop reason: HOSPADM

## 2024-11-07 RX ORDER — OXYCODONE HYDROCHLORIDE 5 MG/1
5 TABLET ORAL
Status: DISCONTINUED | OUTPATIENT
Start: 2024-11-07 | End: 2024-11-08 | Stop reason: HOSPADM

## 2024-11-07 RX ORDER — HYDROMORPHONE HYDROCHLORIDE 1 MG/ML
1 INJECTION, SOLUTION INTRAMUSCULAR; INTRAVENOUS; SUBCUTANEOUS
Status: DISCONTINUED | OUTPATIENT
Start: 2024-11-07 | End: 2024-11-08 | Stop reason: HOSPADM

## 2024-11-07 RX ORDER — DEXMEDETOMIDINE HYDROCHLORIDE 100 UG/ML
INJECTION, SOLUTION INTRAVENOUS
Status: DISCONTINUED | OUTPATIENT
Start: 2024-11-07 | End: 2024-11-07 | Stop reason: SDUPTHER

## 2024-11-07 RX ORDER — ALBUTEROL SULFATE 0.83 MG/ML
2.5 SOLUTION RESPIRATORY (INHALATION)
Status: DISCONTINUED | OUTPATIENT
Start: 2024-11-07 | End: 2024-11-07 | Stop reason: HOSPADM

## 2024-11-07 RX ORDER — LIDOCAINE HYDROCHLORIDE 10 MG/ML
1 INJECTION, SOLUTION EPIDURAL; INFILTRATION; INTRACAUDAL; PERINEURAL
Status: DISCONTINUED | OUTPATIENT
Start: 2024-11-07 | End: 2024-11-07 | Stop reason: HOSPADM

## 2024-11-07 RX ORDER — LISINOPRIL 5 MG/1
10 TABLET ORAL DAILY
Status: DISCONTINUED | OUTPATIENT
Start: 2024-11-08 | End: 2024-11-08 | Stop reason: HOSPADM

## 2024-11-07 RX ORDER — ONDANSETRON 4 MG/1
4 TABLET, ORALLY DISINTEGRATING ORAL EVERY 8 HOURS PRN
Qty: 10 TABLET | Refills: 0 | Status: SHIPPED | OUTPATIENT
Start: 2024-11-07

## 2024-11-07 RX ORDER — DEXAMETHASONE SODIUM PHOSPHATE 4 MG/ML
INJECTION, SOLUTION INTRA-ARTICULAR; INTRALESIONAL; INTRAMUSCULAR; INTRAVENOUS; SOFT TISSUE
Status: DISCONTINUED | OUTPATIENT
Start: 2024-11-07 | End: 2024-11-07 | Stop reason: SDUPTHER

## 2024-11-07 RX ORDER — OXYCODONE HYDROCHLORIDE 5 MG/1
10 TABLET ORAL
Status: DISCONTINUED | OUTPATIENT
Start: 2024-11-07 | End: 2024-11-08 | Stop reason: HOSPADM

## 2024-11-07 RX ORDER — LABETALOL HYDROCHLORIDE 5 MG/ML
10 INJECTION, SOLUTION INTRAVENOUS
Status: DISCONTINUED | OUTPATIENT
Start: 2024-11-07 | End: 2024-11-07 | Stop reason: HOSPADM

## 2024-11-07 RX ORDER — PREGABALIN 75 MG/1
75 CAPSULE ORAL
Status: DISCONTINUED | OUTPATIENT
Start: 2024-11-07 | End: 2024-11-08 | Stop reason: HOSPADM

## 2024-11-07 RX ORDER — SODIUM CHLORIDE 9 MG/ML
INJECTION, SOLUTION INTRAVENOUS PRN
Status: DISCONTINUED | OUTPATIENT
Start: 2024-11-07 | End: 2024-11-08 | Stop reason: HOSPADM

## 2024-11-07 RX ORDER — SODIUM CHLORIDE 0.9 % (FLUSH) 0.9 %
5-40 SYRINGE (ML) INJECTION EVERY 12 HOURS SCHEDULED
Status: DISCONTINUED | OUTPATIENT
Start: 2024-11-07 | End: 2024-11-08 | Stop reason: HOSPADM

## 2024-11-07 RX ORDER — FAMOTIDINE 20 MG/1
20 TABLET, FILM COATED ORAL 2 TIMES DAILY
Status: DISCONTINUED | OUTPATIENT
Start: 2024-11-07 | End: 2024-11-08 | Stop reason: HOSPADM

## 2024-11-07 RX ORDER — ACETAMINOPHEN 325 MG/1
650 TABLET ORAL EVERY 6 HOURS
Status: DISCONTINUED | OUTPATIENT
Start: 2024-11-07 | End: 2024-11-08 | Stop reason: HOSPADM

## 2024-11-07 RX ORDER — 0.9 % SODIUM CHLORIDE 0.9 %
INTRAVENOUS SOLUTION INTRAVENOUS
Status: DISCONTINUED | OUTPATIENT
Start: 2024-11-07 | End: 2024-11-07 | Stop reason: SDUPTHER

## 2024-11-07 RX ORDER — HYDROCHLOROTHIAZIDE 25 MG/1
12.5 TABLET ORAL DAILY
Status: DISCONTINUED | OUTPATIENT
Start: 2024-11-08 | End: 2024-11-08 | Stop reason: HOSPADM

## 2024-11-07 RX ORDER — ROPIVACAINE HYDROCHLORIDE 2 MG/ML
INJECTION, SOLUTION EPIDURAL; INFILTRATION; PERINEURAL
Status: DISCONTINUED | OUTPATIENT
Start: 2024-11-07 | End: 2024-11-07 | Stop reason: SDUPTHER

## 2024-11-07 RX ORDER — FENTANYL CITRATE 50 UG/ML
INJECTION, SOLUTION INTRAMUSCULAR; INTRAVENOUS
Status: DISCONTINUED | OUTPATIENT
Start: 2024-11-07 | End: 2024-11-07 | Stop reason: SDUPTHER

## 2024-11-07 RX ORDER — TRANEXAMIC ACID 100 MG/ML
INJECTION, SOLUTION INTRAVENOUS
Status: DISCONTINUED | OUTPATIENT
Start: 2024-11-07 | End: 2024-11-07 | Stop reason: SDUPTHER

## 2024-11-07 RX ORDER — OXYCODONE HCL 10 MG/1
10 TABLET, FILM COATED, EXTENDED RELEASE ORAL ONCE
Status: COMPLETED | OUTPATIENT
Start: 2024-11-07 | End: 2024-11-07

## 2024-11-07 RX ORDER — ASPIRIN 81 MG/1
81 TABLET ORAL 2 TIMES DAILY
Status: DISCONTINUED | OUTPATIENT
Start: 2024-11-07 | End: 2024-11-08 | Stop reason: HOSPADM

## 2024-11-07 RX ORDER — OXYCODONE HYDROCHLORIDE 5 MG/1
5 TABLET ORAL
Status: DISCONTINUED | OUTPATIENT
Start: 2024-11-07 | End: 2024-11-07 | Stop reason: HOSPADM

## 2024-11-07 RX ORDER — BISACODYL 10 MG
10 SUPPOSITORY, RECTAL RECTAL DAILY PRN
Status: DISCONTINUED | OUTPATIENT
Start: 2024-11-07 | End: 2024-11-08 | Stop reason: HOSPADM

## 2024-11-07 RX ORDER — MEPERIDINE HYDROCHLORIDE 25 MG/ML
12.5 INJECTION INTRAMUSCULAR; INTRAVENOUS; SUBCUTANEOUS EVERY 5 MIN PRN
Status: DISCONTINUED | OUTPATIENT
Start: 2024-11-07 | End: 2024-11-07 | Stop reason: HOSPADM

## 2024-11-07 RX ORDER — OXYCODONE HYDROCHLORIDE 5 MG/1
10 TABLET ORAL
Status: COMPLETED | OUTPATIENT
Start: 2024-11-07 | End: 2024-11-07

## 2024-11-07 RX ORDER — PREGABALIN 50 MG/1
50 CAPSULE ORAL
Qty: 60 CAPSULE | Refills: 0 | Status: SHIPPED | OUTPATIENT
Start: 2024-11-07 | End: 2025-01-06

## 2024-11-07 RX ORDER — LISINOPRIL AND HYDROCHLOROTHIAZIDE 10; 12.5 MG/1; MG/1
1 TABLET ORAL DAILY
Status: DISCONTINUED | OUTPATIENT
Start: 2024-11-07 | End: 2024-11-07

## 2024-11-07 RX ORDER — DROPERIDOL 2.5 MG/ML
0.62 INJECTION, SOLUTION INTRAMUSCULAR; INTRAVENOUS
Status: DISCONTINUED | OUTPATIENT
Start: 2024-11-07 | End: 2024-11-07 | Stop reason: HOSPADM

## 2024-11-07 RX ORDER — MULTIVITAMIN WITH IRON
1 TABLET ORAL DAILY
Status: DISCONTINUED | OUTPATIENT
Start: 2024-11-08 | End: 2024-11-08 | Stop reason: HOSPADM

## 2024-11-07 RX ORDER — KETOROLAC TROMETHAMINE 30 MG/ML
30 INJECTION, SOLUTION INTRAMUSCULAR; INTRAVENOUS EVERY 6 HOURS
Status: DISCONTINUED | OUTPATIENT
Start: 2024-11-07 | End: 2024-11-08 | Stop reason: HOSPADM

## 2024-11-07 RX ORDER — NEOSTIGMINE METHYLSULFATE 1 MG/ML
INJECTION, SOLUTION INTRAVENOUS
Status: DISCONTINUED | OUTPATIENT
Start: 2024-11-07 | End: 2024-11-07 | Stop reason: SDUPTHER

## 2024-11-07 RX ORDER — SUCCINYLCHOLINE/SOD CL,ISO/PF 100 MG/5ML
SYRINGE (ML) INTRAVENOUS
Status: DISCONTINUED | OUTPATIENT
Start: 2024-11-07 | End: 2024-11-07 | Stop reason: SDUPTHER

## 2024-11-07 RX ORDER — ACETAMINOPHEN 325 MG/1
650 TABLET ORAL ONCE
Status: COMPLETED | OUTPATIENT
Start: 2024-11-07 | End: 2024-11-07

## 2024-11-07 RX ORDER — PROPOFOL 10 MG/ML
INJECTION, EMULSION INTRAVENOUS
Status: DISCONTINUED | OUTPATIENT
Start: 2024-11-07 | End: 2024-11-07 | Stop reason: SDUPTHER

## 2024-11-07 RX ORDER — ACETAMINOPHEN 325 MG/1
650 TABLET ORAL EVERY 4 HOURS
Qty: 120 TABLET | Refills: 1 | Status: SHIPPED | OUTPATIENT
Start: 2024-11-07 | End: 2024-11-08 | Stop reason: HOSPADM

## 2024-11-07 RX ORDER — NALOXONE HYDROCHLORIDE 0.4 MG/ML
INJECTION, SOLUTION INTRAMUSCULAR; INTRAVENOUS; SUBCUTANEOUS PRN
Status: DISCONTINUED | OUTPATIENT
Start: 2024-11-07 | End: 2024-11-07 | Stop reason: HOSPADM

## 2024-11-07 RX ADMIN — PROPOFOL 50 MG: 10 INJECTION, EMULSION INTRAVENOUS at 14:35

## 2024-11-07 RX ADMIN — FENTANYL CITRATE 25 MCG: 50 INJECTION, SOLUTION INTRAMUSCULAR; INTRAVENOUS at 16:52

## 2024-11-07 RX ADMIN — ACETAMINOPHEN 650 MG: 325 TABLET ORAL at 12:33

## 2024-11-07 RX ADMIN — PHENYLEPHRINE HYDROCHLORIDE 200 MCG: 10 INJECTION INTRAVENOUS at 14:49

## 2024-11-07 RX ADMIN — Medication 3 MG: at 16:44

## 2024-11-07 RX ADMIN — TRAZODONE HYDROCHLORIDE 100 MG: 100 TABLET ORAL at 21:45

## 2024-11-07 RX ADMIN — OXYCODONE HYDROCHLORIDE 10 MG: 10 TABLET, FILM COATED, EXTENDED RELEASE ORAL at 12:33

## 2024-11-07 RX ADMIN — ATORVASTATIN CALCIUM 10 MG: 20 TABLET, FILM COATED ORAL at 21:48

## 2024-11-07 RX ADMIN — DEXMEDETOMIDINE 6 MCG: 100 INJECTION, SOLUTION INTRAVENOUS at 14:19

## 2024-11-07 RX ADMIN — ROCURONIUM BROMIDE 10 MG: 10 INJECTION, SOLUTION INTRAVENOUS at 14:27

## 2024-11-07 RX ADMIN — KETOROLAC TROMETHAMINE 30 MG: 30 INJECTION, SOLUTION INTRAMUSCULAR at 18:45

## 2024-11-07 RX ADMIN — SODIUM CHLORIDE: 9 INJECTION, SOLUTION INTRAVENOUS at 12:34

## 2024-11-07 RX ADMIN — FENTANYL CITRATE 100 MCG: 50 INJECTION, SOLUTION INTRAMUSCULAR; INTRAVENOUS at 13:41

## 2024-11-07 RX ADMIN — TRANEXAMIC ACID 1000 MG: 100 INJECTION, SOLUTION INTRAVENOUS at 16:44

## 2024-11-07 RX ADMIN — SODIUM CHLORIDE 150 ML: 9 INJECTION, SOLUTION INTRAVENOUS at 17:17

## 2024-11-07 RX ADMIN — SODIUM CHLORIDE: 9 INJECTION, SOLUTION INTRAVENOUS at 18:52

## 2024-11-07 RX ADMIN — FAMOTIDINE 20 MG: 20 TABLET, FILM COATED ORAL at 21:46

## 2024-11-07 RX ADMIN — ROCURONIUM BROMIDE 40 MG: 10 INJECTION, SOLUTION INTRAVENOUS at 14:35

## 2024-11-07 RX ADMIN — Medication 160 MG: at 14:27

## 2024-11-07 RX ADMIN — SODIUM CHLORIDE, POTASSIUM CHLORIDE, SODIUM LACTATE AND CALCIUM CHLORIDE: 600; 310; 30; 20 INJECTION, SOLUTION INTRAVENOUS at 14:19

## 2024-11-07 RX ADMIN — OXYCODONE 10 MG: 5 TABLET ORAL at 18:04

## 2024-11-07 RX ADMIN — PHENYLEPHRINE HYDROCHLORIDE 100 MCG: 10 INJECTION INTRAVENOUS at 15:37

## 2024-11-07 RX ADMIN — LIDOCAINE HYDROCHLORIDE 100 MG: 20 INJECTION, SOLUTION EPIDURAL; INFILTRATION; INTRACAUDAL; PERINEURAL at 14:27

## 2024-11-07 RX ADMIN — OXYCODONE 5 MG: 5 TABLET ORAL at 21:45

## 2024-11-07 RX ADMIN — GLYCOPYRROLATE 0.4 MG: 0.2 INJECTION INTRAMUSCULAR; INTRAVENOUS at 16:44

## 2024-11-07 RX ADMIN — WATER 2000 MG: 1 INJECTION INTRAMUSCULAR; INTRAVENOUS; SUBCUTANEOUS at 14:38

## 2024-11-07 RX ADMIN — MIDAZOLAM HYDROCHLORIDE 2 MG: 1 INJECTION, SOLUTION INTRAMUSCULAR; INTRAVENOUS at 13:41

## 2024-11-07 RX ADMIN — DEXAMETHASONE SODIUM PHOSPHATE 4 MG: 4 INJECTION INTRA-ARTICULAR; INTRALESIONAL; INTRAMUSCULAR; INTRAVENOUS; SOFT TISSUE at 14:40

## 2024-11-07 RX ADMIN — PREGABALIN 75 MG: 75 CAPSULE ORAL at 21:46

## 2024-11-07 RX ADMIN — ROPIVACAINE HYDROCHLORIDE 20 ML: 2 INJECTION, SOLUTION EPIDURAL; INFILTRATION at 13:47

## 2024-11-07 RX ADMIN — ASPIRIN 81 MG: 81 TABLET, COATED ORAL at 21:45

## 2024-11-07 RX ADMIN — DEXMEDETOMIDINE 6 MCG: 100 INJECTION, SOLUTION INTRAVENOUS at 14:38

## 2024-11-07 RX ADMIN — PROPOFOL 50 MCG/KG/MIN: 10 INJECTION, EMULSION INTRAVENOUS at 14:45

## 2024-11-07 RX ADMIN — ONDANSETRON 4 MG: 2 INJECTION INTRAMUSCULAR; INTRAVENOUS at 16:40

## 2024-11-07 RX ADMIN — SODIUM CHLORIDE, PRESERVATIVE FREE 10 ML: 5 INJECTION INTRAVENOUS at 23:03

## 2024-11-07 RX ADMIN — TRANEXAMIC ACID 1000 MG: 100 INJECTION, SOLUTION INTRAVENOUS at 14:54

## 2024-11-07 RX ADMIN — PROPOFOL 150 MG: 10 INJECTION, EMULSION INTRAVENOUS at 14:27

## 2024-11-07 RX ADMIN — WATER 2000 MG: 1 INJECTION INTRAMUSCULAR; INTRAVENOUS; SUBCUTANEOUS at 23:03

## 2024-11-07 RX ADMIN — ACETAMINOPHEN 650 MG: 325 TABLET ORAL at 18:45

## 2024-11-07 RX ADMIN — HYDROMORPHONE HYDROCHLORIDE 0.5 MG: 1 INJECTION, SOLUTION INTRAMUSCULAR; INTRAVENOUS; SUBCUTANEOUS at 17:48

## 2024-11-07 RX ADMIN — PHENYLEPHRINE HYDROCHLORIDE 100 MCG: 10 INJECTION INTRAVENOUS at 14:47

## 2024-11-07 RX ADMIN — PREGABALIN 75 MG: 75 CAPSULE ORAL at 12:33

## 2024-11-07 ASSESSMENT — PAIN DESCRIPTION - PAIN TYPE
TYPE: SURGICAL PAIN
TYPE: SURGICAL PAIN

## 2024-11-07 ASSESSMENT — PAIN DESCRIPTION - ORIENTATION
ORIENTATION: RIGHT

## 2024-11-07 ASSESSMENT — PAIN SCALES - GENERAL
PAINLEVEL_OUTOF10: 0
PAINLEVEL_OUTOF10: 6
PAINLEVEL_OUTOF10: 0
PAINLEVEL_OUTOF10: 4
PAINLEVEL_OUTOF10: 4
PAINLEVEL_OUTOF10: 7
PAINLEVEL_OUTOF10: 4
PAINLEVEL_OUTOF10: 6

## 2024-11-07 ASSESSMENT — PAIN DESCRIPTION - ONSET
ONSET: ON-GOING
ONSET: SUDDEN

## 2024-11-07 ASSESSMENT — PAIN DESCRIPTION - FREQUENCY
FREQUENCY: CONTINUOUS
FREQUENCY: CONTINUOUS

## 2024-11-07 ASSESSMENT — PAIN - FUNCTIONAL ASSESSMENT
PAIN_FUNCTIONAL_ASSESSMENT: PREVENTS OR INTERFERES SOME ACTIVE ACTIVITIES AND ADLS
PAIN_FUNCTIONAL_ASSESSMENT: 0-10
PAIN_FUNCTIONAL_ASSESSMENT: PREVENTS OR INTERFERES SOME ACTIVE ACTIVITIES AND ADLS
PAIN_FUNCTIONAL_ASSESSMENT: PREVENTS OR INTERFERES SOME ACTIVE ACTIVITIES AND ADLS

## 2024-11-07 ASSESSMENT — PAIN DESCRIPTION - DESCRIPTORS
DESCRIPTORS: ACHING
DESCRIPTORS: ACHING;SORE

## 2024-11-07 ASSESSMENT — PAIN DESCRIPTION - LOCATION
LOCATION: KNEE

## 2024-11-07 NOTE — ANESTHESIA PROCEDURE NOTES
Peripheral Block    Patient location during procedure: pre-op  Reason for block: procedure for pain, post-op pain management, primary anesthetic and at surgeon's request  Start time: 11/7/2024 1:41 PM  End time: 11/7/2024 1:48 PM  Staffing  Performed: anesthesiologist   Anesthesiologist: Clinton Ayon MD  Performed by: Clinton Ayon MD  Authorized by: Clinton Ayon MD    Preanesthetic Checklist  Completed: patient identified, IV checked, site marked, risks and benefits discussed, surgical/procedural consents, pre-op evaluation, timeout performed, anesthesia consent given, oxygen available and monitors applied/VS acknowledged  Peripheral Block   Patient position: supine  Prep: ChloraPrep  Provider prep: mask and sterile gloves  Patient monitoring: cardiac monitor, continuous pulse ox, continuous capnometry, frequent blood pressure checks, IV access, oxygen and responsive to questions  Block type: iPacks  Laterality: right  Injection technique: single-shot  Guidance: ultrasound guided    Needle   Needle type: Other   Needle gauge: 21 G  Needle localization: ultrasound guidance  Needle length: 10 cmOther needle type: STIMUPLEX  Assessment   Injection assessment: negative aspiration for heme, no paresthesia on injection, local visualized surrounding nerve on ultrasound and no intravascular symptoms  Hemodynamics: stable  Outcomes: patient tolerated procedure well    Additional Notes  Gianna RN witnessed timeout and block written on correct side.

## 2024-11-07 NOTE — ANESTHESIA POSTPROCEDURE EVALUATION
Department of Anesthesiology  Postprocedure Note    Patient: Vega Matos  MRN: [id number]  YOB: 1960  Date of evaluation: 11/7/2024    Procedure Summary       Date: 11/07/24 Room / Location: [facility] ASU OR [address] AMBULATORY OR    Anesthesia Start: 1419 Anesthesia Stop: 1725    Procedure: RIGHT KNEE EXPLANTATION AND REVISION BOTH COMPONENTS (Right: Knee) Diagnosis:       Pain due to total knee replacement, subsequent encounter      (Pain due to total knee replacement, subsequent encounter [T84.84XD, Z96.659])    Surgeons: Manuel Diamond MD Responsible Provider: Vicente Garcia MD    Anesthesia Type: general, regional ASA Status: 2            Anesthesia Type: No value filed.    Dion Phase I: Dion Score: 10    Dion Phase II:      Anesthesia Post Evaluation    Patient location during evaluation: PACU  Patient participation: complete - patient participated  Level of consciousness: awake and alert and awake  Pain score: 0  Airway patency: patent  Nausea & Vomiting: no vomiting and no nausea  Cardiovascular status: hemodynamically stable  Respiratory status: room air and spontaneous ventilation  Hydration status: stable  Multimodal analgesia pain management approach    No notable events documented.

## 2024-11-07 NOTE — DISCHARGE INSTRUCTIONS
TOTAL KNEE DISCHARGE INSTRUCTIONS      Please take the time to review the following instructions before you leave the hospital and use them as guidelines during your recovery from surgery.  If you have any questions you may contact my office at [phone]  After business hours or during the weekend you can contact me through [employer] [Preferred] or text / call at [phone] (cell phone) for emergency's. Please use the office number during regular business hours.    SPECIAL INSTRUCTIONS :   1. Full extension at the knee is the most important aspect of your range of motion. Avoid placing a pillow or bump behind the knee. Rather, place the heel up on a bump or pillow and allow gravity to help straighten the knee.   2. You may weight bear as tolerated on the knee and during the day you should bend the knee as much as possible.   3. Drainage from the incision more than 4 days from surgery is concerning. Contact my office if there is any question [phone].   4. You may contact me directly through [employer] if there are specific questions or text / call using my cell number [phone].      DRESSING :     Post-op Dressings : This should be removed by physical therapy or you may remove this yourself 7 days after the date of your surgery. If there is no drainage, then a simple dressing may be used or no dressing at all. Other dressing options can be purchased over the counter at a local pharmacy or medical supply vendor.        A porous adhesive dressing such as pictured above can be purchased online (Amazon) or at your local [facility] or [employer]. You only need to keep the incision covered for 7 days after showers. A dressing may be used for longer if there are issues with clothing clinging to the incision.         Showering/ Bathing:    You may shower with the Post-op dressing in place. This is left in place for 7 days following discharge from the hospital. If your incision is dry

## 2024-11-07 NOTE — ANESTHESIA PRE PROCEDURE
for: \"PHART\", \"PO2ART\", \"WJA1EYS\", \"WRZ7YZL\", \"BEART\", \"L2FTAPRY\"     Type & Screen (If Applicable):  No results found for: \"LABABO\"    Drug/Infectious Status (If Applicable):  No results found for: \"HIV\", \"HEPCAB\"    COVID-19 Screening (If Applicable): No results found for: \"COVID19\"        Anesthesia Evaluation  Patient summary reviewed and Nursing notes reviewed  Airway: Mallampati: II     Neck ROM: full  Mouth opening: > = 3 FB   Dental: normal exam         Pulmonary: breath sounds clear to auscultation  (+)     sleep apnea: on CPAP,                                  Cardiovascular:  Exercise tolerance: good (>4 METS)  (+) hypertension:, hyperlipidemia        Rhythm: regular  Rate: normal                    Neuro/Psych:   Negative Neuro/Psych ROS              GI/Hepatic/Renal: Neg GI/Hepatic/Renal ROS            Endo/Other:    (+) : arthritis:..                 Abdominal:             Vascular: negative vascular ROS.         Other Findings:           Anesthesia Plan      general and regional     ASA 2       Induction: intravenous.    MIPS: Postoperative opioids intended and Prophylactic antiemetics administered.  Anesthetic plan and risks discussed with patient.      Plan discussed with CRNA.    Attending anesthesiologist reviewed and agrees with Preprocedure content              Clinton Ayon MD   11/7/2024

## 2024-11-07 NOTE — PROGRESS NOTES
TRANSFER - OUT REPORT:    Verbal report given to Yeimy kait Matos  being transferred to [facility] for routine post-op       Report consisted of patient's Situation, Background, Assessment and   Recommendations(SBAR).     Information from the following report(s) Surgery Report, Intake/Output, MAR, and Med Rec Status, cardiac rhythm NSR was reviewed with the receiving nurse.           Lines:   Peripheral IV 11/07/24 Left;Proximal Forearm (Active)   Site Assessment Clean, dry & intact 11/07/24 1738   Line Status Infusing 11/07/24 1738   Line Care Connections checked and tightened 11/07/24 1738   Phlebitis Assessment No symptoms 11/07/24 1738   Infiltration Assessment 0 11/07/24 1738   Alcohol Cap Used Yes 11/07/24 1738   Dressing Status Clean, dry & intact 11/07/24 1738   Dressing Type Transparent 11/07/24 1738   Dressing Intervention New 11/07/24 1229        Opportunity for questions and clarification was provided.      Patient transported with:  Registered Nurse

## 2024-11-08 VITALS
BODY MASS INDEX: 31 KG/M2 | TEMPERATURE: 98.1 F | OXYGEN SATURATION: 95 % | DIASTOLIC BLOOD PRESSURE: 72 MMHG | HEIGHT: 72 IN | SYSTOLIC BLOOD PRESSURE: 115 MMHG | WEIGHT: 228.84 LBS | RESPIRATION RATE: 16 BRPM | HEART RATE: 65 BPM

## 2024-11-08 LAB
ANION GAP SERPL CALC-SCNC: 6 MMOL/L (ref 2–12)
BACTERIA SPEC CULT: NORMAL
BACTERIA SPEC CULT: NORMAL
BUN SERPL-MCNC: 29 MG/DL (ref 6–20)
BUN/CREAT SERPL: 28 (ref 12–20)
CALCIUM SERPL-MCNC: 8.5 MG/DL (ref 8.5–10.1)
CHLORIDE SERPL-SCNC: 106 MMOL/L (ref 97–108)
CO2 SERPL-SCNC: 24 MMOL/L (ref 21–32)
CREAT SERPL-MCNC: 1.02 MG/DL (ref 0.7–1.3)
GLUCOSE SERPL-MCNC: 116 MG/DL (ref 65–100)
HCT VFR BLD AUTO: 38.2 % (ref 36.6–50.3)
HGB BLD-MCNC: 13.2 G/DL (ref 12.1–17)
POTASSIUM SERPL-SCNC: 4.1 MMOL/L (ref 3.5–5.1)
SERVICE CMNT-IMP: NORMAL
SERVICE CMNT-IMP: NORMAL
SODIUM SERPL-SCNC: 136 MMOL/L (ref 136–145)

## 2024-11-08 PROCEDURE — 97116 GAIT TRAINING THERAPY: CPT

## 2024-11-08 PROCEDURE — 97165 OT EVAL LOW COMPLEX 30 MIN: CPT

## 2024-11-08 PROCEDURE — 96375 TX/PRO/DX INJ NEW DRUG ADDON: CPT

## 2024-11-08 PROCEDURE — 97161 PT EVAL LOW COMPLEX 20 MIN: CPT

## 2024-11-08 PROCEDURE — G0378 HOSPITAL OBSERVATION PER HR: HCPCS

## 2024-11-08 PROCEDURE — 6370000000 HC RX 637 (ALT 250 FOR IP): Performed by: PHYSICIAN ASSISTANT

## 2024-11-08 PROCEDURE — 85014 HEMATOCRIT: CPT

## 2024-11-08 PROCEDURE — 96374 THER/PROPH/DIAG INJ IV PUSH: CPT

## 2024-11-08 PROCEDURE — 96376 TX/PRO/DX INJ SAME DRUG ADON: CPT

## 2024-11-08 PROCEDURE — APPNB60 APP NON BILLABLE TIME 46-60 MINS: Performed by: NURSE PRACTITIONER

## 2024-11-08 PROCEDURE — 6360000002 HC RX W HCPCS: Performed by: PHYSICIAN ASSISTANT

## 2024-11-08 PROCEDURE — 6370000000 HC RX 637 (ALT 250 FOR IP): Performed by: ORTHOPAEDIC SURGERY

## 2024-11-08 PROCEDURE — 2580000003 HC RX 258: Performed by: PHYSICIAN ASSISTANT

## 2024-11-08 PROCEDURE — 80048 BASIC METABOLIC PNL TOTAL CA: CPT

## 2024-11-08 PROCEDURE — 36415 COLL VENOUS BLD VENIPUNCTURE: CPT

## 2024-11-08 PROCEDURE — 97530 THERAPEUTIC ACTIVITIES: CPT

## 2024-11-08 PROCEDURE — 85018 HEMOGLOBIN: CPT

## 2024-11-08 PROCEDURE — 94761 N-INVAS EAR/PLS OXIMETRY MLT: CPT

## 2024-11-08 RX ORDER — HYDROCODONE BITARTRATE AND ACETAMINOPHEN 5; 325 MG/1; MG/1
1-2 TABLET ORAL EVERY 4 HOURS PRN
Qty: 40 TABLET | Refills: 0 | Status: SHIPPED | OUTPATIENT
Start: 2024-11-08 | End: 2024-11-15

## 2024-11-08 RX ORDER — CEFADROXIL 500 MG/1
500 CAPSULE ORAL 2 TIMES DAILY
Qty: 8 CAPSULE | Refills: 0 | Status: SHIPPED | OUTPATIENT
Start: 2024-11-08 | End: 2024-11-22

## 2024-11-08 RX ADMIN — ACETAMINOPHEN 650 MG: 325 TABLET ORAL at 12:17

## 2024-11-08 RX ADMIN — KETOROLAC TROMETHAMINE 30 MG: 30 INJECTION, SOLUTION INTRAMUSCULAR at 12:17

## 2024-11-08 RX ADMIN — ONDANSETRON 4 MG: 4 TABLET, ORALLY DISINTEGRATING ORAL at 05:24

## 2024-11-08 RX ADMIN — THERA TABS 1 TABLET: TAB at 08:37

## 2024-11-08 RX ADMIN — ACETAMINOPHEN 650 MG: 325 TABLET ORAL at 06:57

## 2024-11-08 RX ADMIN — HYDROMORPHONE HYDROCHLORIDE 0.5 MG: 1 INJECTION, SOLUTION INTRAMUSCULAR; INTRAVENOUS; SUBCUTANEOUS at 00:08

## 2024-11-08 RX ADMIN — HYDROMORPHONE HYDROCHLORIDE 0.5 MG: 1 INJECTION, SOLUTION INTRAMUSCULAR; INTRAVENOUS; SUBCUTANEOUS at 05:24

## 2024-11-08 RX ADMIN — SODIUM CHLORIDE, PRESERVATIVE FREE 10 ML: 5 INJECTION INTRAVENOUS at 08:40

## 2024-11-08 RX ADMIN — WATER 2000 MG: 1 INJECTION INTRAMUSCULAR; INTRAVENOUS; SUBCUTANEOUS at 06:57

## 2024-11-08 RX ADMIN — HYDROCHLOROTHIAZIDE 12.5 MG: 25 TABLET ORAL at 08:37

## 2024-11-08 RX ADMIN — ASPIRIN 81 MG: 81 TABLET, COATED ORAL at 08:37

## 2024-11-08 RX ADMIN — KETOROLAC TROMETHAMINE 30 MG: 30 INJECTION, SOLUTION INTRAMUSCULAR at 00:08

## 2024-11-08 RX ADMIN — ACETAMINOPHEN 650 MG: 325 TABLET ORAL at 00:08

## 2024-11-08 RX ADMIN — KETOROLAC TROMETHAMINE 30 MG: 30 INJECTION, SOLUTION INTRAMUSCULAR at 06:58

## 2024-11-08 RX ADMIN — LISINOPRIL 10 MG: 5 TABLET ORAL at 08:37

## 2024-11-08 RX ADMIN — OXYCODONE 5 MG: 5 TABLET ORAL at 03:59

## 2024-11-08 RX ADMIN — OXYCODONE 5 MG: 5 TABLET ORAL at 08:37

## 2024-11-08 RX ADMIN — FAMOTIDINE 20 MG: 20 TABLET, FILM COATED ORAL at 08:36

## 2024-11-08 ASSESSMENT — PAIN SCALES - GENERAL
PAINLEVEL_OUTOF10: 3
PAINLEVEL_OUTOF10: 2
PAINLEVEL_OUTOF10: 3
PAINLEVEL_OUTOF10: 5
PAINLEVEL_OUTOF10: 5
PAINLEVEL_OUTOF10: 6

## 2024-11-08 ASSESSMENT — PAIN SCALES - WONG BAKER
WONGBAKER_NUMERICALRESPONSE: NO HURT
WONGBAKER_NUMERICALRESPONSE: NO HURT

## 2024-11-08 ASSESSMENT — PAIN DESCRIPTION - LOCATION: LOCATION: KNEE

## 2024-11-08 NOTE — PROGRESS NOTES
Orthopaedic Progress Note  Post Op day: 1 Day Post-Op    2024 9:13 AM     Patient: Vega Matos MRN: [id number]  SSN: [id number]    YOB: 1960  Age: 64 y.o.  Sex: male      Admit date:  2024  Date of Surgery:  [unfilled]   Procedures:  Procedure(s):  RIGHT KNEE EXPLANTATION AND REVISION BOTH COMPONENTS  Admitting Physician:  Manuel Diamond MD   Surgeon:  Surgeons and Role:     * Manuel Diamond MD - Primary    Consulting Physician(s): Treatment Team:   Manuel Diamond, Manuel Quiroga MD Aysheshim, Jovi Cesar, MICHELLE Scott, Elham Osorio, Katherine Padilla, Carlene Cavanaugh, OT  Shalonda Kahn, Kymberly Moncada    SUBJECTIVE:     Vega Matos is a 64 y.o. male is 1 Day Post-Op s/p Procedure(s):  RIGHT KNEE EXPLANTATION AND REVISION BOTH COMPONENTS with an appropriate level of post-operative pain.  No complaints of nausea, vomiting, dizziness, lightheadedness, chest pain, or shortness of breath.    OBJECTIVE:       Physical Exam:  General: Alert, cooperative, no distress.    Respiratory: Respirations unlabored  Neurological:  Neurovascular exam within normal limits. Motor: EHL/DF/PF 5/5, SILT  Musculoskeletal: Calves soft, supple, non-tender upon palpation. Cap refill brisk, foot warm, DP2+  Dressing/Wound:  Clean, dry and intact. No significant erythema or swelling.      Vital Signs:      Patient Vitals for the past 8 hrs:   BP Temp Temp src Pulse Resp SpO2   24 0817 115/63 98.1 °F (36.7 °C) Oral 73 16 94 %   24 0412 111/67 97.5 °F (36.4 °C) Oral 74 17 92 %                                          Temp (24hrs), Av.1 °F (36.7 °C), Min:97.5 °F (36.4 °C), Max:98.8 °F (37.1 °C)      Labs:        Recent Labs     24  0657   HCT 38.2   HGB 13.2     Lab Results   Component Value Date/Time     2024 06:57 AM    K 4.1 2024 06:57 AM     2024 06:57 AM    CO2 24 2024 06:57 AM    BUN 29 2024  Per Manan, sinuses are all open on CT. No need to come in sooner.    Patient informed.

## 2024-11-08 NOTE — PROGRESS NOTES
Orthopaedic Progress Note  Post Op day: 1 Day Post-Op    2024 10:18 AM     Patient: Vega Matos MRN: [id number]  SSN: [id number]    YOB: 1960  Age: 64 y.o.  Sex: male      Admit date:  2024  Date of Surgery:  [unfilled]   Procedures:  Procedure(s):  RIGHT KNEE EXPLANTATION AND REVISION BOTH COMPONENTS  Admitting Physician:  Manuel Diamond MD   Surgeon:  Surgeons and Role:     * Manuel Diamond MD - Primary    Consulting Physician(s): Treatment Team:   Manuel Diamond MD Kerr, Glenn J, Jovi Shah, RN  Tyler, Elham Osorio, Katherine Padilla, Carlene Cavanaugh, OT  Shalonda Kahn, PT  Kymberly Muñoz    SUBJECTIVE:     Vega Matos is a 64 y.o. male is 1 Day Post-Op s/p Procedure(s):  RIGHT KNEE EXPLANTATION AND REVISION BOTH COMPONENTS with an appropriate level of post-operative pain.  No complaints of nausea, vomiting, dizziness, lightheadedness, chest pain, or shortness of breath. Patient requests to be off oxycodone and start hydrocodone.     OBJECTIVE:       Physical Exam:  General: Alert, cooperative, no distress.    Respiratory: Respirations unlabored  Neurological:  Neurovascular exam within normal limits. Motor: EHL/DF/PF 5/5, SILT  Musculoskeletal: Calves soft, supple, non-tender upon palpation. Cap refill brisk, foot warm, DP2+  Dressing/Wound:  Clean, dry and intact. No significant erythema or swelling.      Vital Signs:      Patient Vitals for the past 8 hrs:   BP Temp Temp src Pulse Resp SpO2   24 0817 115/63 98.1 °F (36.7 °C) Oral 73 16 94 %   24 0412 111/67 97.5 °F (36.4 °C) Oral 74 17 92 %                                          Temp (24hrs), Av.1 °F (36.7 °C), Min:97.5 °F (36.4 °C), Max:98.8 °F (37.1 °C)      Labs:        Recent Labs     24  0657   HCT 38.2   HGB 13.2     Lab Results   Component Value Date/Time     2024 06:57 AM    K 4.1 2024 06:57 AM     2024 06:57 AM    CO2 24

## 2024-11-08 NOTE — PROGRESS NOTES
PHYSICAL THERAPY EVALUATION/DISCHARGE    Patient: Vega Matos (64 y.o. 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  Patient demonstrates intact sensation and motor function bilaterally.  Instructed in LE supine therex with minimal cueing needed, good carryover. Overall mobilizing very well at modified independence level with no cues needed for safe RW transfer.   Exhibited reciprocal gait pattern noted today with minimal offloading, instructed in offloading pattern if needed should pain increased. Trained in stair climbing with R rail, two hands and lateral approach, no safety concerns or knee buckling.  Reviewed car transfers, activity modification and pacing with patient having no further questions, confident in ability to discharge.  Patient cleared from mobility standpoint with OPPT recommended to further promote functional mobility independence and tolerance.    Further skilled acute physical therapy is not indicated at this time.       PLAN :  Recommendation for discharge: (in order for the patient to meet his/her long term goals):   Outpatient physical therapy for R knee    Other factors to consider for discharge: no additional factors    IF patient discharges home will need the following DME: patient owns DME required for discharge       SUBJECTIVE:   Patient stated “It feels pretty good considering.”    OBJECTIVE DATA SUMMARY:                                         []                                           Hamstring Sets 1 10 [x]                                        []                                        []                                        []                                           Short Arc Quads   []                                        []                                        []                                        []                                           Knee Extension Stretch     []                                          []                                          []                                          []                                           Heel Slides 1 10 [x]                                        []                                        []                                        []                                           Long Arc Quads   []                                        []                                        []                                        []                                           Knee Flexion Stretch   []                                        []                                        []                                        []                                           Straight Leg Raises   []                                        []                                        []                                        []                                                                                                                                                                                                                                                                                                                                                                                                                                                                                                   [facility] AM-PAC®      Basic Mobility Inpatient Short Form

## 2024-11-08 NOTE — PLAN OF CARE
OCCUPATIONAL THERAPY EVALUATION/DISCHARGE  Patient: Vega Matos (64 y.o. 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 Diamond and is WBAT). Per pt report, pt lives with spouse in a two story home (main living area/master bedroom on first floor) with 4 steps tevin rails to enter and is independent for self care and functional transfers/mobility.     Patient educated on LB dressing technique of surgery leg in first out last with pt demonstrating/verbalizing understanding. Patient presents close to baseline functional status for self care (MI all self care tasks) and functional transfers/mobility (MI all transfers with RW and gait belt). Patient with no acute OT needs at this time thus will D/C from skilled OT services after eval.     Functional Outcome Measure:  The patient scored 24/24 on the [facility] Am-PAC outcome measure     PLAN :  Recommend with staff: up to chair for meals, up to commode for toileting    Recommendation for discharge: (in order for the patient to meet his/her long term goals):   No skilled occupational therapy    Other factors to consider for discharge: no additional factors    IF patient discharges home will need the following DME: none     SUBJECTIVE:   Patient stated, “I will be working from home over the next few weeks.”    OBJECTIVE DATA SUMMARY:     Past Medical History:   Diagnosis Date    Anxiety     Arthritis     Benign microscopic hematuria     HTN  clinically-significant functional categories (i.e. Activities of daily living).    Cutoff score 39.4 (19) correlates to a good likelihood of discharging home versus a facility  Renay John, Zara Bell, Jian Davis, Aliya Vázquez, Nino Ignacio, Venkata John, -PAC “6-Clicks” Functional Assessment Scores Predict Acute Care Hospital Discharge Destination, Physical Therapy, Volume 94, Issue 9, 2014, Pages 9696-2462, [email or web address]    Pain Ratin/10   Pain Intervention(s):   nursing notified, ice, rest, and repositioning    Activity Tolerance:   Good    After treatment:   Patient left in no apparent distress sitting up in chair and Call bell within reach    COMMUNICATION/EDUCATION:   The patient's plan of care was discussed with: physical therapist and registered nurse    Patient Education  Education Given To: Patient  Education Provided: Role of Therapy;Plan of Care;ADL Adaptive Strategies;Transfer Training;Mobility Training;Fall Prevention Strategies;Precautions  Education Method: Demonstration;Verbal;Teach Back  Barriers to Learning: None  Education Outcome: Verbalized understanding;Demonstrated understanding    Thank you for this referral.  Carlene Padilla OT  Minutes: 18    Occupational Therapy Evaluation Charge Determination   History Examination Decision-Making   LOW Complexity : Brief history review  LOW Complexity: 1-3 Performance deficits relating to physical, cognitive, or psychosocial skills that result in activity limitations and/or participation restrictions LOW Complexity: No comorbidities that affect functional and  no verbal  or physical assist needed to complete eval tasks   Based on the above components, the patient evaluation is determined to be of the following complexity level: Low

## 2024-11-08 NOTE — CARE COORDINATION
Care Management Initial Assessment  11/8/2024 12:04 PM  If patient is discharged prior to next notation, then this note serves as note for discharge by case management.        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]  Procedure(s) (LRB):  RIGHT KNEE EXPLANTATION AND REVISION BOTH COMPONENTS (Right)  1 Day Post-Op        Patient Admission Status: Observation  Date Admitted to INP: 11/7/24  RUR: Readmission Risk Score: 5.2      Hospitalization in the last 30 days (Readmission):  No        Advance Care Planning:  Code Status: Full Code  Primary Healthcare Decision Maker: (P) Legal Next of Kin (document not on file)   Advance Directive: legal NOK-document is not on file     __________________________________________________________________________  Assessment:          11/08/24 1202   Service Assessment   Patient Orientation Alert and Oriented   Cognition Alert   History Provided By Patient   Primary Caregiver Self   Support Systems Spouse/Significant Other   Patient's Healthcare Decision Maker is: Legal Next of Kin  (document not on file)   PCP Verified by CM Yes  (Dr. Tee)   Last Visit to PCP Within last 3 months   Prior Functional Level Independent in ADLs/IADLs   Can patient return to prior living arrangement Yes   Family able to assist with home care needs: Yes   Financial Resources Other (Comment)  ([employer])   Community Resources None   Social/Functional History   Lives With Spouse   Type of Home House   Home Layout Two level;Able to Live on Main level with bedroom/bathroom   Home Access Stairs to enter with rails   Entrance Stairs - Number of Steps 4   Bathroom Shower/Tub Walk-in shower   Bathroom Equipment Built-in shower seat   Home Equipment Cane;Walker - Rolling  (ice machine)   ADL Assistance Independent   Homemaking Assistance Independent   Ambulation Assistance Independent    Transfer Assistance Independent   Active  Yes   Occupation Full time employment   Discharge Planning   Type of Residence House   Living Arrangements Spouse/Significant Other   Current Services Prior To Admission None   Potential Assistance Needed Outpatient PT/OT   DME Ordered? No   Type of Home Care Services None   Patient expects to be discharged to: House   One/Two Story Residence Two story, live on first floor   Services At/After Discharge   Transition of Care Consult (CM Consult) N/A   Services At/After Discharge None   [place]ade Resource Information Provided? No   Mode of Transport at Discharge Other (see comment)  (family)         Comments: Patient has all required DME for discharge and has outpt PT appointment arranged. CM needs are not anticipated at this time, please consult CM if needs arise.      Discharge Concerns: []Yes [x]No []Unknown   Describe:    Financial concerns/barriers: []Yes, explain: [x]No []Unknown/Not discussed  __________________________________________________________________________    Insurer:   Active Insurance as of 11/7/2024       Primary Coverage       Payor Plan Insurance Group Employer/Plan Group    [employer] NAP CHOICE POS II [id number]       Payor Plan Address Payor Plan Phone Number Payor Plan Fax Number Effective Dates    [address]   1/1/2024 - None Entered    [facility] [id number]         Subscriber Name Subscriber Birth Date Member ID       MAI ALEGRE 1960 [id number]                     PCP: Van Tee DO   Address: [address] [place] [address]*   Phone number: [phone]        Pharmacy:   [id number] - [place], [place] - [address] - P [phone] - F [phone]  [address]  [facility] [address]  Phone: [phone] Fax: [phone]        DC Transport:  family         Transition of care plan:    []Unable to determine at this time. Awaiting clinical progress, and

## 2024-11-08 NOTE — PLAN OF CARE
Problem: Pain  Goal: Verbalizes/displays adequate comfort level or baseline comfort level  11/8/2024 1226 by Katherine Osorio  Outcome: HH/HSPC Resolved Met  11/8/2024 1027 by Katherine Osorio  Outcome: HH/HSPC Progressing  11/8/2024 0123 by Rhiannon Orta, RN  Outcome: Progressing     Problem: Safety - Adult  Goal: Free from fall injury  11/8/2024 1226 by Katherine Osorio  Outcome: HH/HSPC Resolved Met  11/8/2024 1027 by Katherine Osorio  Outcome: HH/HSPC Progressing  11/8/2024 0123 by Rhiannon Orta, RN  Outcome: Progressing     Problem: ABCDS Injury Assessment  Goal: Absence of physical injury  11/8/2024 1226 by Katherine Osorio  Outcome: HH/HSPC Resolved Met  11/8/2024 1027 by Katherine Osorio  Outcome: HH/HSPC Progressing  11/8/2024 0123 by Rhiannon Orta, RN  Outcome: Progressing     Problem: Discharge Planning  Goal: Discharge to home or other facility with appropriate resources  11/8/2024 1226 by Katherine Osorio  Outcome: HH/HSPC Resolved Met  11/8/2024 1027 by Katherine Osorio  Outcome: HH/HSPC Progressing  11/8/2024 0123 by Rhiannon Orta, RN  Outcome: Progressing

## 2024-11-08 NOTE — PLAN OF CARE
Problem: Pain  Goal: Verbalizes/displays adequate comfort level or baseline comfort level  11/8/2024 1027 by Katherine Osorio  Outcome: /\[facility]\"" Progressing  11/8/2024 0123 by Rhiannon Orta, RN  Outcome: Progressing     Problem: Safety - Adult  Goal: Free from fall injury  11/8/2024 1027 by Katherine Osorio  Outcome: /\[facility]\"" Progressing  11/8/2024 0123 by Rhiannon Orta, RN  Outcome: Progressing     Problem: ABCDS Injury Assessment  Goal: Absence of physical injury  11/8/2024 1027 by Katherine Osorio  Outcome: /\[facility]\"" Progressing  11/8/2024 0123 by Rhiannon Orta, RN  Outcome: Progressing     Problem: Discharge Planning  Goal: Discharge to home or other facility with appropriate resources  11/8/2024 1027 by Katherine Osorio  Outcome: /\[facility]\"" Progressing  11/8/2024 0123 by Rhiannon Orta, RN  Outcome: Progressing

## 2024-11-10 LAB
BACTERIA SPEC CULT: ABNORMAL
GRAM STN SPEC: ABNORMAL
SERVICE CMNT-IMP: ABNORMAL
SERVICE CMNT-IMP: ABNORMAL

## 2024-11-11 NOTE — OP NOTE
OPERATIVE REPORT     Admit Date: 11/7/2024  Admit Diagnosis: Pain due to total knee replacement, subsequent encounter [T84.84XD, Z96.659]  Failed total right knee replacement, subsequent encounter [T84.012D]    Date of Procedure: [unfilled]   Preoperative Diagnosis: Pain due to total knee replacement, subsequent encounter [T84.84XD, Z96.659]  Postoperative Diagnosis: Same  Procedure: Procedure(s):  RIGHT KNEE EXPLANTATION AND REVISION BOTH COMPONENTS  Surgeon: Manuel Diamond MD  Assistant(s): Jim Monaco PA-C  Anesthesia: Regional   Estimated Blood Loss: 300cc  Specimens:   ID Type Source Tests Collected by Time Destination   A : aspirate right knee Swab Joint, Knee CULTURE, ANAEROBIC, CULTURE, WOUND Manuel Diamond MD 11/7/2024 6967    B : aspirate right knee Body Fluid Joint, Knee SYNOVIAL FLUID, CELL COUNT Manuel Diamond MD 11/7/2024 0889    C : Right Tibial Interface Swab Joint, Knee CULTURE, ANAEROBIC, CULTURE, WOUND Manuel Diamond MD 11/7/2024 1513       Complications: None          INDICATIONS:     The patient is a 64 y.o., male who has a 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 The patient underwent the appropriate preoperative labs and was indicated for surgery. Informed consent obtained including a discussion of the risks and benefits, which include, but are not limited to, bleeding, infection, neurovascular damage, wound complications, pain and stiffness in the knee, periprosthetic loosening, fracture dislocation and DVT, the patient consented for the procedure.     DESCRIPTION OF PROCEDURE:     The proper limb was identified and signed in the preoperative holding area. All questions were answered.        After adequate anesthesia, the Right lower extremity was prepped and draped in sterile fashion. Examination of the knee under anesthesia demonstrated stable ligamentous exam. The patient was positioned supine on the operating room table. Using the old incision a mid-line

## 2024-11-21 LAB
BACTERIA SPEC CULT: ABNORMAL
BACTERIA SPEC CULT: NORMAL
BACTERIA SPEC CULT: NORMAL
GRAM STN SPEC: ABNORMAL
SERVICE CMNT-IMP: ABNORMAL
SERVICE CMNT-IMP: ABNORMAL
SERVICE CMNT-IMP: NORMAL
SERVICE CMNT-IMP: NORMAL

## 2024-11-29 ENCOUNTER — APPOINTMENT (OUTPATIENT)
Facility: HOSPITAL | Age: 64
End: 2024-11-29
Payer: COMMERCIAL

## 2024-11-29 ENCOUNTER — HOSPITAL ENCOUNTER (EMERGENCY)
Facility: HOSPITAL | Age: 64
Discharge: HOME | End: 2024-11-29
Attending: EMERGENCY MEDICINE
Payer: COMMERCIAL

## 2024-11-29 VITALS
TEMPERATURE: 98.9 F | SYSTOLIC BLOOD PRESSURE: 145 MMHG | WEIGHT: 229 LBS | OXYGEN SATURATION: 99 % | RESPIRATION RATE: 16 BRPM | BODY MASS INDEX: 31.02 KG/M2 | DIASTOLIC BLOOD PRESSURE: 76 MMHG | HEART RATE: 85 BPM | HEIGHT: 72 IN

## 2024-11-29 DIAGNOSIS — M79.604 RIGHT LEG PAIN: Primary | ICD-10-CM

## 2024-11-29 LAB — ECHO BSA: 2.3 M2

## 2024-11-29 PROCEDURE — 93971 EXTREMITY STUDY: CPT

## 2024-11-29 PROCEDURE — 99284 EMERGENCY DEPT VISIT MOD MDM: CPT

## 2024-11-29 ASSESSMENT — PAIN DESCRIPTION - LOCATION: LOCATION: LEG

## 2024-11-29 ASSESSMENT — PAIN DESCRIPTION - ORIENTATION: ORIENTATION: RIGHT

## 2024-11-29 ASSESSMENT — LIFESTYLE VARIABLES
HOW OFTEN DO YOU HAVE A DRINK CONTAINING ALCOHOL: 2-4 TIMES A MONTH
HOW MANY STANDARD DRINKS CONTAINING ALCOHOL DO YOU HAVE ON A TYPICAL DAY: 1 OR 2

## 2024-11-29 ASSESSMENT — PAIN - FUNCTIONAL ASSESSMENT: PAIN_FUNCTIONAL_ASSESSMENT: PREVENTS OR INTERFERES SOME ACTIVE ACTIVITIES AND ADLS

## 2024-11-29 ASSESSMENT — PAIN SCALES - GENERAL: PAINLEVEL_OUTOF10: 2

## 2024-11-29 ASSESSMENT — PAIN DESCRIPTION - DESCRIPTORS: DESCRIPTORS: ACHING

## 2024-11-29 NOTE — ED PROVIDER NOTES
[facility] EMERGENCY DEPT  EMERGENCY DEPARTMENT ENCOUNTER      Pt Name: Vega Matos  MRN: [id number]  Birthdate 1960  Date of evaluation: 11/29/2024  Provider: Sil Mclaughlin DO    CHIEF COMPLAINT       Chief Complaint   Patient presents with    Leg Pain         HISTORY OF PRESENT ILLNESS   (Location/Symptom, Timing/Onset, Context/Setting, Quality, Duration, Modifying Factors, Severity)  Note limiting factors.   HPI      Review of External Medical Records:     Nursing Notes were reviewed.    REVIEW OF SYSTEMS    (2-9 systems for level 4, 10 or more for level 5)     Review of Systems    Except as noted above the remainder of the review of systems was reviewed and negative.       PAST MEDICAL HISTORY     Past Medical History:   Diagnosis Date    Anxiety     Arthritis     Benign microscopic hematuria     HTN (hypertension)     Hyperlipidemia     Hypoglycemia     Insomnia     ISMA on CPAP     Unspecified adverse effect of anesthesia     woke up in middle of surgery 2011 retinal surgery         SURGICAL HISTORY       Past Surgical History:   Procedure Laterality Date    ANKLE SURGERY Left     torn ligament repair    CATARACT EXTRACTION, BILATERAL Bilateral     COLONOSCOPY      LEG SURGERY Right 02/28/2024    LEG INCISION AND DRAINAGE WITH POLYEXCHANGE performed by Manuel Diamond MD at [facility] MAIN OR    RETINAL DETACHMENT SURGERY Bilateral     left x 3 times; right x 3 times    REVISION TOTAL KNEE ARTHROPLASTY Right 12/11/2023    RIGHT KNEE POLY EXCHANGE WITH LATERAL RELEASE AND MEDIAL REEFING performed by Manuel Diamond MD at [facility] AMBULATORY OR    REVISION TOTAL KNEE ARTHROPLASTY Right 11/7/2024    RIGHT KNEE EXPLANTATION AND REVISION BOTH COMPONENTS performed by Manuel Diamond MD at [facility] AMBULATORY OR    TONSILLECTOMY      TOTAL KNEE ARTHROPLASTY Right 12/05/2022    performed by Dr. Manuel Diamond    UMBILICAL HERNIA REPAIR      WISDOM TOOTH EXTRACTION           CURRENT MEDICATIONS       Previous Medications    ASPIRIN  venous thrombosis identified.            Electronically signed by FE AVERY           LABS:  Labs Reviewed - No data to display    All other labs were within normal range or not returned as of this dictation.    EMERGENCY DEPARTMENT COURSE and DIFFERENTIAL DIAGNOSIS/MDM:   Vitals:    Vitals:    11/29/24 0830   BP: (!) 145/76   Pulse: 85   Resp: 16   Temp: 98.9 °F (37.2 °C)   TempSrc: Oral   SpO2: 99%   Weight: 103.9 kg (229 lb)   Height: 1.829 m (6')           Medical Decision Making  64-year-old male presents emergency department chief complaint of right calf tenderness over the last week.  3 weeks ago he had a total knee revision from prior surgery.  He takes a baby aspirin twice per day.  He has been doing his physical therapy as directed.  Denies history of DVT or PE.  He came in today due to calf discomfort increased swelling and was concerned about possible DVT.  No shortness of breath or chest pain.  Will order duplex            REASSESSMENT        11:06 AM  Duplex negative,  advised to return if worsen    CONSULTS:  None    PROCEDURES:  Unless otherwise noted below, none     Procedures      FINAL IMPRESSION      1. Right leg pain          DISPOSITION/PLAN   DISPOSITION Decision To Discharge 11/29/2024 11:06:22 AM      PATIENT REFERRED TO:  No follow-up provider specified.    DISCHARGE MEDICATIONS:  New Prescriptions    No medications on file         (Please note that portions of this note were completed with a voice recognition program.  Efforts were made to edit the dictations but occasionally words are mis-transcribed.)    Sil Mclaughlin DO (electronically signed)  Emergency Attending Physician / Physician Assistant / Nurse Practitioner             Sil Mclaughlin DO  11/29/24 8542

## 2024-11-29 NOTE — ED TRIAGE NOTES
Pt ambulated to treatment area with a steady gait. Pt is 3 weeks post op right TKR. Here today with 1 week H/O right calf tenderness/tightness. Denies any chest pain or SOB.

## 2024-11-29 NOTE — ED NOTES
Patient left the ED prior to receiving formal results or AVS, but Dr. Mclaughlin spoke with patient by phone to discuss results and discharge instructions.

## 2025-03-26 LAB
ALBUMIN SERPL-MCNC: 4.8 G/DL (ref 3.9–4.9)
ALP SERPL-CCNC: 68 IU/L (ref 44–121)
ALT SERPL-CCNC: 17 IU/L (ref 0–44)
AST SERPL-CCNC: 23 IU/L (ref 0–40)
BASOPHILS # BLD AUTO: 0 X10E3/UL (ref 0–0.2)
BASOPHILS NFR BLD AUTO: 0 %
BILIRUB SERPL-MCNC: 0.3 MG/DL (ref 0–1.2)
BUN SERPL-MCNC: 25 MG/DL (ref 8–27)
BUN/CREAT SERPL: 31 (ref 10–24)
CALCIUM SERPL-MCNC: 10.5 MG/DL (ref 8.6–10.2)
CHLORIDE SERPL-SCNC: 97 MMOL/L (ref 96–106)
CO2 SERPL-SCNC: 27 MMOL/L (ref 20–29)
CREAT SERPL-MCNC: 0.8 MG/DL (ref 0.76–1.27)
CRP SERPL-MCNC: 1 MG/L (ref 0–10)
EGFRCR SERPLBLD CKD-EPI 2021: 99 ML/MIN/1.73
EOSINOPHIL # BLD AUTO: 0.1 X10E3/UL (ref 0–0.4)
EOSINOPHIL NFR BLD AUTO: 2 %
ERYTHROCYTE [DISTWIDTH] IN BLOOD BY AUTOMATED COUNT: 13.2 % (ref 11.6–15.4)
GLOBULIN SER CALC-MCNC: 2.5 G/DL (ref 1.5–4.5)
GLUCOSE SERPL-MCNC: 82 MG/DL (ref 70–99)
HCT VFR BLD AUTO: 48.5 % (ref 37.5–51)
HGB BLD-MCNC: 16.3 G/DL (ref 13–17.7)
IMM GRANULOCYTES # BLD AUTO: 0 X10E3/UL (ref 0–0.1)
IMM GRANULOCYTES NFR BLD AUTO: 0 %
LYMPHOCYTES # BLD AUTO: 1 X10E3/UL (ref 0.7–3.1)
LYMPHOCYTES NFR BLD AUTO: 21 %
MCH RBC QN AUTO: 30.9 PG (ref 26.6–33)
MCHC RBC AUTO-ENTMCNC: 33.6 G/DL (ref 31.5–35.7)
MCV RBC AUTO: 92 FL (ref 79–97)
MONOCYTES # BLD AUTO: 0.7 X10E3/UL (ref 0.1–0.9)
MONOCYTES NFR BLD AUTO: 14 %
NEUTROPHILS # BLD AUTO: 2.9 X10E3/UL (ref 1.4–7)
NEUTROPHILS NFR BLD AUTO: 63 %
PLATELET # BLD AUTO: 276 X10E3/UL (ref 150–450)
POTASSIUM SERPL-SCNC: 4.4 MMOL/L (ref 3.5–5.2)
PROT SERPL-MCNC: 7.3 G/DL (ref 6–8.5)
RBC # BLD AUTO: 5.27 X10E6/UL (ref 4.14–5.8)
SODIUM SERPL-SCNC: 139 MMOL/L (ref 134–144)
WBC # BLD AUTO: 4.7 X10E3/UL (ref 3.4–10.8)

## (undated) DEVICE — COVER,MAYO STAND,STERILE: Brand: MEDLINE

## (undated) DEVICE — SUTURE VICRYL + SZ 2-0 L36IN ABSRB UD L36MM CT-1 1/2 CIR VCP945H

## (undated) DEVICE — SPONGE GZ W4XL4IN COT 12 PLY TYP VII WVN C FLD DSGN STERILE

## (undated) DEVICE — SYR LR LCK 1ML GRAD NSAF 30ML --

## (undated) DEVICE — SUTURE MONOCRYL STRATAFIX SPRL + 3 0 SGL ARMED PS 1 24 IN LEN SXMP1B103

## (undated) DEVICE — SYRINGE IRRIG 60ML SFT PLIABLE BLB EZ TO GRP 1 HND USE W/

## (undated) DEVICE — TOTAL JOINT-SFMC: Brand: MEDLINE INDUSTRIES, INC.

## (undated) DEVICE — SOLUTION IRRIG 3000ML 0.9% SOD CHL USP UROMATIC PLAS CONT

## (undated) DEVICE — GLOVE SURG SZ 85 L12IN FNGR THK79MIL GRN LTX FREE

## (undated) DEVICE — CONTAINER,SPECIMEN,3OZ,OR STRL: Brand: MEDLINE

## (undated) DEVICE — KIT CKPT FEM TIB MAKOPLSTY -- STRL MAKO

## (undated) DEVICE — BLUNTFILL: Brand: MONOJECT

## (undated) DEVICE — RUBBERBAND FASTENING W0.25XL3.5IN 5 PER PK

## (undated) DEVICE — DUAL IRRIGATION ADAPTOR

## (undated) DEVICE — GLOVE SURG SZ 85 L12IN FNGR ORTHO 126MIL CRM LTX FREE

## (undated) DEVICE — SOLUTION IRRIG 1000ML STRL H2O USP PLAS POUR BTL

## (undated) DEVICE — ELECTRODE BLDE L4IN NONINSULATED EDGE

## (undated) DEVICE — Device: Brand: JELCO

## (undated) DEVICE — GLOVE SURG SZ 9 L12IN FNGR THK79MIL GRN LTX FREE

## (undated) DEVICE — SUTURE VCRL SZ 2-0 L36IN ABSRB UD L40MM CT 1/2 CIR J957H

## (undated) DEVICE — 1010 S-DRAPE TOWEL DRAPE 10/BX: Brand: STERI-DRAPE™

## (undated) DEVICE — STRYKER PERFORMANCE SERIES SAGITTAL BLADE: Brand: STRYKER PERFORMANCE SERIES

## (undated) DEVICE — PREP KIT PEEL PTCH POVIDONE IOD

## (undated) DEVICE — SUTURE MCRYL SZ 3-0 L27IN ABSRB UD L24MM PS-1 3/8 CIR PRIM Y936H

## (undated) DEVICE — DRESSING ANTIMIC FOAM OPTIFOAM POSTOP ADH 4X14 IN

## (undated) DEVICE — SUTURE VCRL + SZ 1-0 L36IN ABSRB UD CTX 1/2 CIR TAPR PNT VCP977H

## (undated) DEVICE — SUTURE NONABSORBABLE MONOFILAMENT 2-0 FS 18 IN ETHILON 664H

## (undated) DEVICE — CEMENT MIXING SYSTEM WITH FEMORAL BREAKWAY NOZZLE: Brand: REVOLUTION

## (undated) DEVICE — KIT PROC KNE TRACKING PK/1 -- VIZADISC MAKO

## (undated) DEVICE — SPONGE GZ W4XL4IN COT 12 PLY TYP VII WVN C FLD DSGN

## (undated) DEVICE — SYRINGE MED 10ML LUERLOCK TIP W/O SFTY DISP

## (undated) DEVICE — SYRINGE MED 30ML STD CLR PLAS LUERLOCK TIP N CTRL DISP

## (undated) DEVICE — CATHETER,FOLEY,100%SILICONE,24FR,10ML,LF: Brand: MEDLINE

## (undated) DEVICE — SUTURE STRATAFIX SYMMETRIC SZ 1 L18IN ABSRB VLT CT1 L36CM SXPP1A404

## (undated) DEVICE — 4-PORT MANIFOLD: Brand: NEPTUNE 2

## (undated) DEVICE — DRAPE KT RIO 1-PC W/POCKETS -- MAKO

## (undated) DEVICE — Device

## (undated) DEVICE — PENCIL SMK EVAC ALL IN 1 DSGN ENH VISIBILITY IMPROVED AIR

## (undated) DEVICE — HOOD, PEEL-AWAY: Brand: FLYTE

## (undated) DEVICE — SWAB CULT DBL W/O CHAR RAYON TIP AMIES GEL CLMN FOR COLL

## (undated) DEVICE — DRAPE,EXTREMITY,89X128,STERILE: Brand: MEDLINE

## (undated) DEVICE — SUTURE VICRYL + SZ 1-0 L36IN ABSRB UD CTX 1/2 CIR TAPR PNT VCP977H

## (undated) DEVICE — SOLUTION WND IRRIGATION 450 ML 0.5 PVP-I 0.9 NACL

## (undated) DEVICE — BLADE SAG STD -- MAKO

## (undated) DEVICE — TAPE,CLOTH/SILK,CURAD,3"X10YD,LF,40/CS: Brand: CURAD

## (undated) DEVICE — SOLUTION IV 1000ML 0.9% SOD CHL PH 5 INJ USP VIAFLX PLAS

## (undated) DEVICE — BLADE SAW W9XL25MM THK051MM CUT THK074MM REPL S STL SAG

## (undated) DEVICE — HOOD: Brand: FLYTE

## (undated) DEVICE — SMOKE EVACUATION PENCIL: Brand: VALLEYLAB

## (undated) DEVICE — BLADE, FLAT, 10 MM X 11 CM, SINGLE USE: Brand: SHUKLA BLADE S9BLADE

## (undated) DEVICE — ZIMMER® STERILE DISPOSABLE TOURNIQUET CUFF WITH PROTECTIVE SLEEVE AND PLC, DUAL PORT, SINGLE BLADDER, 34 IN. (86 CM)

## (undated) DEVICE — SUTURE PROL SZ 0 L30IN NONABSORBABLE BLU L40MM CT 1/2 CIR 8434H

## (undated) DEVICE — BONE PREPARATION KIT: Brand: BIOPREP

## (undated) DEVICE — SUT VCRL 2-0 36IN CT1 UD --

## (undated) DEVICE — BLADE, FLAT, 8MM X 5CM,SINGLE USE: Brand: SHUKLA BLADE S9BLADE